# Patient Record
Sex: FEMALE | Race: WHITE | NOT HISPANIC OR LATINO | Employment: FULL TIME | URBAN - METROPOLITAN AREA
[De-identification: names, ages, dates, MRNs, and addresses within clinical notes are randomized per-mention and may not be internally consistent; named-entity substitution may affect disease eponyms.]

---

## 2017-09-08 ENCOUNTER — OFFICE VISIT (OUTPATIENT)
Dept: OPHTHALMOLOGY | Facility: CLINIC | Age: 47
End: 2017-09-08
Payer: COMMERCIAL

## 2017-09-08 DIAGNOSIS — H04.551 NASOLACRIMAL DUCT OBSTRUCTION, ACQUIRED, RIGHT: ICD-10-CM

## 2017-09-08 DIAGNOSIS — D31.32 CHOROIDAL NEVUS OF BOTH EYES: Primary | ICD-10-CM

## 2017-09-08 DIAGNOSIS — D31.31 CHOROIDAL NEVUS OF BOTH EYES: Primary | ICD-10-CM

## 2017-09-08 PROCEDURE — 92004 COMPRE OPH EXAM NEW PT 1/>: CPT | Mod: S$GLB,,, | Performed by: OPHTHALMOLOGY

## 2017-09-08 PROCEDURE — 99999 PR PBB SHADOW E&M-NEW PATIENT-LVL III: CPT | Mod: PBBFAC,,, | Performed by: OPHTHALMOLOGY

## 2017-09-08 PROCEDURE — 92250 FUNDUS PHOTOGRAPHY W/I&R: CPT | Mod: S$GLB,,, | Performed by: OPHTHALMOLOGY

## 2017-09-08 RX ORDER — FERROUS SULFATE 325(65) MG
325 TABLET ORAL
COMMUNITY
End: 2019-01-30

## 2017-09-08 RX ORDER — ROSUVASTATIN CALCIUM 40 MG/1
10 TABLET, COATED ORAL NIGHTLY
COMMUNITY
End: 2019-12-30

## 2017-09-08 RX ORDER — MAGNESIUM 30 MG
TABLET ORAL ONCE
COMMUNITY
End: 2019-01-30

## 2017-09-08 RX ORDER — LEVOTHYROXINE SODIUM 75 UG/1
75 TABLET ORAL DAILY
COMMUNITY
End: 2019-01-01 | Stop reason: SDUPTHER

## 2017-09-08 RX ORDER — CHOLECALCIFEROL (VITAMIN D3) 25 MCG
5000 TABLET ORAL DAILY
COMMUNITY
End: 2023-05-09

## 2017-09-08 RX ORDER — NAPROXEN SODIUM 220 MG/1
81 TABLET, FILM COATED ORAL DAILY
COMMUNITY
End: 2019-08-15

## 2017-09-08 RX ORDER — BUPROPION HYDROCHLORIDE 100 MG/1
100 TABLET ORAL 2 TIMES DAILY
COMMUNITY
End: 2019-01-30

## 2017-09-08 RX ORDER — ALPRAZOLAM 0.25 MG/1
0.25 TABLET ORAL NIGHTLY PRN
COMMUNITY

## 2017-09-08 RX ORDER — LORATADINE 10 MG/1
10 TABLET ORAL DAILY
COMMUNITY
End: 2019-01-30

## 2017-09-08 NOTE — PATIENT INSTRUCTIONS
Mild nasolacrimal duct obstruction.  Apply pressure to area a few times a week to express it so it will flow better.  If no better, you may need probing to open the area.  At this time very mild, no need for further intervention.

## 2017-09-08 NOTE — PROGRESS NOTES
===============================  09/08/2017   Mable Redding,   46 y.o. female   Last visit [facility]: :Visit date not found   Last visit eye dept. Visit date not found  [facility]:  Uncorrected distance visual acuity was 20/20 in the right eye and 20/20 in the left eye.  Tonometry     Tonometry (Applanation, 9:51 AM)       Right Left    Pressure 18 18               Not recorded         Not recorded        Chief Complaint   Patient presents with    choroidal nevus     pt states that she has a nevus in both eyes        HPI     choroidal nevus    Additional comments: pt states that she has a nevus in both eyes           Comments   Nevus ou         Last edited by MARI Crane on 9/8/2017  9:43 AM. (History)      Read Studies:   Vitals  ________________  9/8/2017  Problem List Items Addressed This Visit     None      Visit Diagnoses     Choroidal nevus of both eyes    -  Primary    Relevant Orders    Color Fundus Photography - OU - Both Eyes (Completed)    Nasolacrimal duct obstruction, acquired, right            Mild NLDO OD., recommend mild pressure to express  Nevus diagnosed years outside clinic both eyes, benign  OD 1DD Nevus along inf arcade Darker lesion @ 7:00 v base Chrp  OS choroidal pigment  rtc 1 year  .       ===========================

## 2018-10-19 DIAGNOSIS — J34.89 INTERNAL NASAL LESION: Primary | ICD-10-CM

## 2018-10-20 ENCOUNTER — NURSE TRIAGE (OUTPATIENT)
Dept: ADMINISTRATIVE | Facility: CLINIC | Age: 48
End: 2018-10-20

## 2018-10-21 DIAGNOSIS — J34.89 NASAL LESION: Primary | ICD-10-CM

## 2018-10-21 RX ORDER — MUPIROCIN 20 MG/G
OINTMENT TOPICAL 2 TIMES DAILY
Qty: 22 G | Refills: 1 | Status: SHIPPED | OUTPATIENT
Start: 2018-10-21 | End: 2020-03-27

## 2018-11-07 ENCOUNTER — DOCUMENTATION ONLY (OUTPATIENT)
Dept: HEMATOLOGY/ONCOLOGY | Facility: CLINIC | Age: 48
End: 2018-11-07

## 2018-11-07 DIAGNOSIS — J01.00 SUBACUTE MAXILLARY SINUSITIS: Primary | ICD-10-CM

## 2018-11-07 RX ORDER — AMOXICILLIN AND CLAVULANATE POTASSIUM 875; 125 MG/1; MG/1
1 TABLET, FILM COATED ORAL 2 TIMES DAILY
Qty: 28 TABLET | Refills: 0 | Status: SHIPPED | OUTPATIENT
Start: 2018-11-07 | End: 2018-11-21

## 2018-11-07 NOTE — PROGRESS NOTES
She came in complaining of a productive cough, sonus congestion and facial pressure.  No obvious fver or SOB  Lungs were clear and well ventilated. Oropharynx unremarkable.ears normal.  Will prescribe augmentin 875

## 2019-01-01 DIAGNOSIS — E03.9 ACQUIRED HYPOTHYROIDISM: Primary | ICD-10-CM

## 2019-01-01 RX ORDER — LEVOTHYROXINE SODIUM 75 UG/1
75 TABLET ORAL DAILY
Qty: 90 TABLET | Refills: 3 | Status: SHIPPED | OUTPATIENT
Start: 2019-01-01 | End: 2019-12-30

## 2019-01-28 DIAGNOSIS — R07.89 OTHER CHEST PAIN: Primary | ICD-10-CM

## 2019-01-30 ENCOUNTER — OFFICE VISIT (OUTPATIENT)
Dept: CARDIOLOGY | Facility: CLINIC | Age: 49
End: 2019-01-30
Payer: COMMERCIAL

## 2019-01-30 ENCOUNTER — CLINICAL SUPPORT (OUTPATIENT)
Dept: CARDIOLOGY | Facility: CLINIC | Age: 49
End: 2019-01-30
Payer: COMMERCIAL

## 2019-01-30 VITALS
BODY MASS INDEX: 33.91 KG/M2 | WEIGHT: 211 LBS | HEIGHT: 66 IN | DIASTOLIC BLOOD PRESSURE: 84 MMHG | HEART RATE: 76 BPM | SYSTOLIC BLOOD PRESSURE: 132 MMHG

## 2019-01-30 DIAGNOSIS — R07.89 OTHER CHEST PAIN: ICD-10-CM

## 2019-01-30 DIAGNOSIS — I26.99 IATROGENIC PULMONARY EMBOLISM AND INFARCTION, SUBSEQUENT ENCOUNTER: ICD-10-CM

## 2019-01-30 DIAGNOSIS — R06.02 SHORTNESS OF BREATH: Primary | ICD-10-CM

## 2019-01-30 DIAGNOSIS — Z82.49 FAMILY HISTORY OF PREMATURE CAD: ICD-10-CM

## 2019-01-30 DIAGNOSIS — Z79.01 LONG TERM (CURRENT) USE OF ANTICOAGULANTS: ICD-10-CM

## 2019-01-30 DIAGNOSIS — R00.2 PALPITATION: ICD-10-CM

## 2019-01-30 DIAGNOSIS — T81.718D IATROGENIC PULMONARY EMBOLISM AND INFARCTION, SUBSEQUENT ENCOUNTER: ICD-10-CM

## 2019-01-30 DIAGNOSIS — D68.62 LUPUS ANTICOAGULANT DISORDER: ICD-10-CM

## 2019-01-30 DIAGNOSIS — R93.1 HIGH CORONARY ARTERY CALCIUM SCORE: ICD-10-CM

## 2019-01-30 PROCEDURE — 93000 ELECTROCARDIOGRAM COMPLETE: CPT | Mod: S$GLB,,, | Performed by: INTERNAL MEDICINE

## 2019-01-30 PROCEDURE — 3008F PR BODY MASS INDEX (BMI) DOCUMENTED: ICD-10-PCS | Mod: CPTII,S$GLB,, | Performed by: INTERNAL MEDICINE

## 2019-01-30 PROCEDURE — 3008F BODY MASS INDEX DOCD: CPT | Mod: CPTII,S$GLB,, | Performed by: INTERNAL MEDICINE

## 2019-01-30 PROCEDURE — 99999 PR PBB SHADOW E&M-EST. PATIENT-LVL III: CPT | Mod: PBBFAC,,, | Performed by: INTERNAL MEDICINE

## 2019-01-30 PROCEDURE — 99999 PR PBB SHADOW E&M-EST. PATIENT-LVL III: ICD-10-PCS | Mod: PBBFAC,,, | Performed by: INTERNAL MEDICINE

## 2019-01-30 PROCEDURE — 99204 PR OFFICE/OUTPT VISIT, NEW, LEVL IV, 45-59 MIN: ICD-10-PCS | Mod: S$GLB,,, | Performed by: INTERNAL MEDICINE

## 2019-01-30 PROCEDURE — 99204 OFFICE O/P NEW MOD 45 MIN: CPT | Mod: S$GLB,,, | Performed by: INTERNAL MEDICINE

## 2019-01-30 PROCEDURE — 93000 EKG 12-LEAD: ICD-10-PCS | Mod: S$GLB,,, | Performed by: INTERNAL MEDICINE

## 2019-01-30 RX ORDER — LOSARTAN POTASSIUM 50 MG/1
100 TABLET ORAL
Refills: 1 | COMMUNITY
Start: 2019-01-19 | End: 2021-09-23

## 2019-01-30 NOTE — PROGRESS NOTES
Subjective:   Patient ID:  Mable Redding is a 48 y.o. female who presents for evaluation of Chest Pain; Shortness of Breath; Dizziness; Hypertension; Palpitations; and Consult      HPI  A 47 yo female with lupus anticoagulant positive in the setting of birth control pills elading to recurrent pe was on coumadin for 13 years also ahs fh premature cad and has high lp(a) and hscrp she is on statins and asa ec 81 mg po daily./ is here to establish care. She has nocturnal chest pain midsternal that occurs in the middle of the nite wakes her  Up from sleep no sour taste no choking she feels her heart race at times.  No snoring no cessation of breathing. She has exertional shortness of breath that is chronic no regular exercise physically . Has no symptoms of shortness of breath with her home daily activities she  Noticed that when talking and walking. No leg swelling. has palpitation. Has no syncope near syncope . Has hysterectomy dec 2017.she has ct scan of chest showing high calcium score no Report available   Past Medical History:   Diagnosis Date    Anxiety     Depression     Lupus anticoagulant disorder     Lupus anticoagulant disorder 1/30/2019       History reviewed. No pertinent surgical history.    Social History     Tobacco Use    Smoking status: Former Smoker    Smokeless tobacco: Never Used   Substance Use Topics    Alcohol use: No    Drug use: No       Family History   Problem Relation Age of Onset    No Known Problems Mother     No Known Problems Father     No Known Problems Sister     No Known Problems Brother     No Known Problems Maternal Aunt     No Known Problems Maternal Uncle     No Known Problems Paternal Aunt     No Known Problems Paternal Uncle     No Known Problems Maternal Grandmother     No Known Problems Maternal Grandfather     No Known Problems Paternal Grandmother     No Known Problems Paternal Grandfather     Amblyopia Neg Hx     Blindness Neg Hx     Cancer Neg Hx      Cataracts Neg Hx     Diabetes Neg Hx     Glaucoma Neg Hx     Hypertension Neg Hx     Macular degeneration Neg Hx     Retinal detachment Neg Hx     Strabismus Neg Hx     Stroke Neg Hx     Thyroid disease Neg Hx        Current Outpatient Medications   Medication Sig    acetic acid-hydrocortisone (VOSOL-HC) otic solution Place in ear(s). 2  Otic Four times a day    alprazolam (XANAX) 0.25 MG tablet Take 0.25 mg by mouth 3 (three) times daily.    aspirin 81 MG Chew Take 81 mg by mouth once daily.    levothyroxine (SYNTHROID) 75 MCG tablet Take 1 tablet (75 mcg total) by mouth once daily.    losartan (COZAAR) 50 MG tablet TK 1 T PO QD    mupirocin (BACTROBAN) 2 % ointment Apply topically 2 (two) times daily.    rosuvastatin (CRESTOR) 40 MG Tab Take 10 mg by mouth every evening.    vitamin D 1000 units Tab Take 5,000 Units by mouth once daily.      No current facility-administered medications for this visit.      Current Outpatient Medications on File Prior to Visit   Medication Sig    acetic acid-hydrocortisone (VOSOL-HC) otic solution Place in ear(s). 2  Otic Four times a day    alprazolam (XANAX) 0.25 MG tablet Take 0.25 mg by mouth 3 (three) times daily.    aspirin 81 MG Chew Take 81 mg by mouth once daily.    levothyroxine (SYNTHROID) 75 MCG tablet Take 1 tablet (75 mcg total) by mouth once daily.    losartan (COZAAR) 50 MG tablet TK 1 T PO QD    mupirocin (BACTROBAN) 2 % ointment Apply topically 2 (two) times daily.    rosuvastatin (CRESTOR) 40 MG Tab Take 10 mg by mouth every evening.    vitamin D 1000 units Tab Take 5,000 Units by mouth once daily.     [DISCONTINUED] buPROPion (WELLBUTRIN) 100 MG tablet Take 100 mg by mouth 2 (two) times daily.    [DISCONTINUED] cetirizine (ZYRTEC) 10 MG tablet Take 10 mg by mouth. 1 Tablet Oral Every day    [DISCONTINUED] ciprofloxacin-hydrocortisone 0.2-1% (CIPRO HC) otic suspension     [DISCONTINUED] ferrous sulfate 325 mg (65 mg iron) Tab tablet  Take 325 mg by mouth daily with breakfast.    [DISCONTINUED] loratadine (CLARITIN) 10 mg tablet Take 10 mg by mouth once daily.    [DISCONTINUED] magnesium 30 mg Tab Take by mouth once.    [DISCONTINUED] moxifloxacin (AVELOX) 400 mg tablet Take 400 mg by mouth. 1 Tablet Oral Every day    [DISCONTINUED] mupirocin calcium 2% nasl oint (BACTROBAN) 2 % Oint by Nasal route 2 (two) times daily.    [DISCONTINUED] neomycin-polymyxin-hydrocortisone (CORTISPORIN) otic solution Place in ear(s). 4 Solution Otic Four times a day    [DISCONTINUED] omeprazole (PRILOSEC) 20 MG capsule Take 20 mg by mouth. 1 Capsule, Delayed Release(E.C.) Oral Every day    [DISCONTINUED] sertraline (ZOLOFT) 50 MG tablet Take 50 mg by mouth. 1 Tablet Oral Every day     No current facility-administered medications on file prior to visit.        Review of patient's allergies indicates:   Allergen Reactions    Aldactone [spironolactone]     Sulfa (sulfonamide antibiotics) Edema       Review of Systems   Constitution: Negative for diaphoresis, weakness, malaise/fatigue and weight gain.   HENT: Negative for hoarse voice.    Eyes: Negative for double vision and visual disturbance.   Cardiovascular: Positive for dyspnea on exertion, irregular heartbeat and palpitations. Negative for chest pain, claudication, cyanosis, leg swelling, near-syncope, orthopnea, paroxysmal nocturnal dyspnea and syncope.   Respiratory: Positive for shortness of breath. Negative for cough, hemoptysis and snoring.    Hematologic/Lymphatic: Negative for bleeding problem. Does not bruise/bleed easily.   Skin: Negative for color change and poor wound healing.   Musculoskeletal: Negative for muscle cramps, muscle weakness and myalgias.   Gastrointestinal: Negative for bloating, abdominal pain, change in bowel habit, diarrhea, heartburn, hematemesis, hematochezia, melena and nausea.   Neurological: Negative for excessive daytime sleepiness, dizziness, headaches,  "light-headedness, loss of balance and numbness.   Psychiatric/Behavioral: Negative for memory loss. The patient does not have insomnia.    Allergic/Immunologic: Negative for hives.       Objective:   Physical Exam   Constitutional: She is oriented to person, place, and time. She appears well-developed and well-nourished. She does not appear ill. No distress.   HENT:   Head: Normocephalic and atraumatic.   Eyes: EOM are normal. Pupils are equal, round, and reactive to light. No scleral icterus.   Neck: Normal range of motion. Neck supple. Normal carotid pulses, no hepatojugular reflux and no JVD present. Carotid bruit is not present. No tracheal deviation present. No thyromegaly present.   Cardiovascular: Normal rate, regular rhythm, normal heart sounds and normal pulses. Exam reveals no gallop and no friction rub.   No murmur heard.  Pulmonary/Chest: Effort normal and breath sounds normal. No respiratory distress. She has no wheezes. She has no rhonchi. She has no rales. She exhibits no tenderness.   Abdominal: Soft. Normal appearance, normal aorta and bowel sounds are normal. She exhibits no abdominal bruit, no ascites and no pulsatile midline mass. There is no hepatomegaly. There is no tenderness.   Musculoskeletal: She exhibits no edema.        Right shoulder: She exhibits no deformity.   Neurological: She is alert and oriented to person, place, and time. She has normal strength. No cranial nerve deficit. Coordination normal.   Skin: Skin is warm and dry. No rash noted. She is not diaphoretic. No cyanosis or erythema. Nails show no clubbing.   Psychiatric: She has a normal mood and affect. Her speech is normal and behavior is normal.   Nursing note and vitals reviewed.    Vitals:    01/30/19 0759   BP: 132/84   BP Location: Right arm   Patient Position: Sitting   BP Method: Medium (Manual)   Pulse: 76   Weight: 95.7 kg (211 lb)   Height: 5' 6" (1.676 m)     Lab Results   Component Value Date    CHOL 197 " 10/16/2007    CHOL 227 (H) 08/07/2007     Lab Results   Component Value Date    HDL 52 10/16/2007    HDL 58 08/07/2007     Lab Results   Component Value Date    LDLCALC 113.0 10/16/2007    LDLCALC 137.6 (H) 08/07/2007     Lab Results   Component Value Date    TRIG 160 (H) 10/16/2007    TRIG 157 (H) 08/07/2007     Lab Results   Component Value Date    CHOLHDL 26.4 10/16/2007    CHOLHDL 25.6 08/07/2007       Chemistry        Component Value Date/Time     10/16/2007 0829    K 4.1 10/16/2007 0829     10/16/2007 0829    CO2 25 10/16/2007 0829    BUN 10 10/16/2007 0829    CREATININE 0.9 10/16/2007 0829     10/16/2007 0829        Component Value Date/Time    CALCIUM 9.4 10/16/2007 0829    ALKPHOS 75 10/16/2007 0829    AST 22 10/16/2007 0829    ALT 35 (H) 10/16/2007 0829    BILITOT 0.4 10/16/2007 0829          Lab Results   Component Value Date    TSH 6.1 (H) 10/23/2007     Lab Results   Component Value Date    INR 3.3 (H) 05/13/2013    INR 3.5 (H) 04/25/2013    INR 2.8 (H) 03/28/2013     Lab Results   Component Value Date    WBC 7.56 10/16/2007    HGB 13.0 10/16/2007    HCT 38.9 10/16/2007    MCV 79.4 (L) 10/16/2007     10/16/2007     BNP  @LABRCNTIP(BNP,BNPTRIAGEBLO)@  CrCl cannot be calculated (Patient's most recent lab result is older than the maximum 7 days allowed.).  Assessment:     1. Shortness of breath    2. Lupus anticoagulant disorder    3. Iatrogenic pulmonary embolism and infarction, subsequent encounter    4. Long term (current) use of anticoagulants    5. Palpitation    6. High coronary artery calcium score    7. Family history of premature CAD      Multiple risk factor for cad with high ca score strong fh cad with some shortness of breath and plapitation. I will need to r/o active coronary ischemia. Agree withs aidan asa  Will obtain echo stress echo and holter. Need to get official report of ct scan and labs from pcp since I need to know her ldl and lp(a) hscrp. tsh.  Plan:    Echo stress echo   holter   Get lab rtesults and ctscan report   Then decide on f/u.  Calcium score on ct is 1.1

## 2019-02-10 ENCOUNTER — HOSPITAL ENCOUNTER (EMERGENCY)
Facility: HOSPITAL | Age: 49
Discharge: HOME | End: 2019-02-10
Attending: EMERGENCY MEDICINE
Payer: COMMERCIAL

## 2019-02-10 VITALS
WEIGHT: 216.94 LBS | RESPIRATION RATE: 20 BRPM | SYSTOLIC BLOOD PRESSURE: 153 MMHG | BODY MASS INDEX: 34.86 KG/M2 | TEMPERATURE: 98 F | DIASTOLIC BLOOD PRESSURE: 72 MMHG | HEIGHT: 66 IN | HEART RATE: 70 BPM | OXYGEN SATURATION: 100 %

## 2019-02-10 DIAGNOSIS — R10.9 ABDOMINAL PAIN: ICD-10-CM

## 2019-02-10 DIAGNOSIS — K21.9 GASTROESOPHAGEAL REFLUX DISEASE, ESOPHAGITIS PRESENCE NOT SPECIFIED: ICD-10-CM

## 2019-02-10 DIAGNOSIS — R10.11 RUQ PAIN: Primary | ICD-10-CM

## 2019-02-10 LAB
ALBUMIN SERPL BCP-MCNC: 3.6 G/DL
ALP SERPL-CCNC: 73 U/L
ALT SERPL W/O P-5'-P-CCNC: 21 U/L
ANION GAP SERPL CALC-SCNC: 10 MMOL/L
APTT BLDCRRT: 24.2 SEC
AST SERPL-CCNC: 16 U/L
BASOPHILS # BLD AUTO: 0.03 K/UL
BASOPHILS NFR BLD: 0.3 %
BILIRUB SERPL-MCNC: 0.3 MG/DL
BNP SERPL-MCNC: 12 PG/ML
BUN SERPL-MCNC: 15 MG/DL
CALCIUM SERPL-MCNC: 9.8 MG/DL
CHLORIDE SERPL-SCNC: 106 MMOL/L
CO2 SERPL-SCNC: 26 MMOL/L
CREAT SERPL-MCNC: 0.9 MG/DL
D DIMER PPP IA.FEU-MCNC: 0.44 MG/L FEU
DIFFERENTIAL METHOD: ABNORMAL
EOSINOPHIL # BLD AUTO: 0.3 K/UL
EOSINOPHIL NFR BLD: 3.5 %
ERYTHROCYTE [DISTWIDTH] IN BLOOD BY AUTOMATED COUNT: 13.9 %
EST. GFR  (AFRICAN AMERICAN): >60 ML/MIN/1.73 M^2
EST. GFR  (NON AFRICAN AMERICAN): >60 ML/MIN/1.73 M^2
GLUCOSE SERPL-MCNC: 108 MG/DL
HCT VFR BLD AUTO: 39 %
HGB BLD-MCNC: 13.2 G/DL
INR PPP: 0.9
LIPASE SERPL-CCNC: 38 U/L
LYMPHOCYTES # BLD AUTO: 2.9 K/UL
LYMPHOCYTES NFR BLD: 30.1 %
MCH RBC QN AUTO: 28.2 PG
MCHC RBC AUTO-ENTMCNC: 33.8 G/DL
MCV RBC AUTO: 83 FL
MONOCYTES # BLD AUTO: 0.8 K/UL
MONOCYTES NFR BLD: 8.5 %
NEUTROPHILS # BLD AUTO: 5.5 K/UL
NEUTROPHILS NFR BLD: 57.6 %
PLATELET # BLD AUTO: 243 K/UL
PMV BLD AUTO: 9 FL
POTASSIUM SERPL-SCNC: 3.8 MMOL/L
PROT SERPL-MCNC: 6.6 G/DL
PROTHROMBIN TIME: 9.8 SEC
RBC # BLD AUTO: 4.68 M/UL
SODIUM SERPL-SCNC: 142 MMOL/L
TROPONIN I SERPL DL<=0.01 NG/ML-MCNC: <0.006 NG/ML
WBC # BLD AUTO: 9.53 K/UL

## 2019-02-10 PROCEDURE — 83880 ASSAY OF NATRIURETIC PEPTIDE: CPT

## 2019-02-10 PROCEDURE — 93010 ELECTROCARDIOGRAM REPORT: CPT | Mod: ,,, | Performed by: INTERNAL MEDICINE

## 2019-02-10 PROCEDURE — 25000003 PHARM REV CODE 250: Performed by: EMERGENCY MEDICINE

## 2019-02-10 PROCEDURE — 93005 ELECTROCARDIOGRAM TRACING: CPT

## 2019-02-10 PROCEDURE — 84484 ASSAY OF TROPONIN QUANT: CPT

## 2019-02-10 PROCEDURE — 83690 ASSAY OF LIPASE: CPT

## 2019-02-10 PROCEDURE — 85610 PROTHROMBIN TIME: CPT

## 2019-02-10 PROCEDURE — 85379 FIBRIN DEGRADATION QUANT: CPT

## 2019-02-10 PROCEDURE — 80053 COMPREHEN METABOLIC PANEL: CPT

## 2019-02-10 PROCEDURE — 93010 EKG 12-LEAD: ICD-10-PCS | Mod: ,,, | Performed by: INTERNAL MEDICINE

## 2019-02-10 PROCEDURE — 85025 COMPLETE CBC W/AUTO DIFF WBC: CPT

## 2019-02-10 PROCEDURE — 99285 EMERGENCY DEPT VISIT HI MDM: CPT | Mod: 25

## 2019-02-10 PROCEDURE — 85730 THROMBOPLASTIN TIME PARTIAL: CPT

## 2019-02-10 RX ADMIN — DICYCLOMINE HYDROCHLORIDE 50 ML: 10 SOLUTION ORAL at 08:02

## 2019-02-11 NOTE — ED PROVIDER NOTES
SCRIBE #1 NOTE: I, Taylor Bobby, am scribing for, and in the presence of, Jelly Mar MD. I have scribed the entire note.      History      Chief Complaint   Patient presents with    Abdominal Pain     pt reports RLQ pain, intermittent stabbing pain, started approx 30 min ago       Review of patient's allergies indicates:   Allergen Reactions    Aldactone [spironolactone]     Sulfa (sulfonamide antibiotics) Edema        HPI   HPI    2/10/2019, 7:06 PM   History obtained from the patient      History of Present Illness: Mable Redding is a 48 y.o. female patient with PMHx of multiple PEs who presents to the Emergency Department for evaluation of RUQ pain which onset suddenly x1 hour ago. Patient states she ate barb cake before sxs onset. Pain is intermittent, stabbing and mild in severity. Reports symptoms have currently resolved. No mitigating or exacerbating factors reported. Associated sxs include SOB with exertion. Patient denies any fever, chills, CP, pain with breathing, N/V/D, cough, extremity pain/swelling, and all other sxs at this time. No prior Tx reported. Patient notes she takes 1 baby aspirin per day. No further complaints or concerns at this time.         Arrival mode: Personal vehicle    PCP: Casa Reeder Jr, MD       Past Medical History:  Past Medical History:   Diagnosis Date    Anxiety     Depression     Lupus anticoagulant disorder     Lupus anticoagulant disorder 1/30/2019       Past Surgical History:  History reviewed. No pertinent surgical history.      Family History:  Family History   Problem Relation Age of Onset    No Known Problems Mother     No Known Problems Father     No Known Problems Sister     No Known Problems Brother     No Known Problems Maternal Aunt     No Known Problems Maternal Uncle     No Known Problems Paternal Aunt     No Known Problems Paternal Uncle     No Known Problems Maternal Grandmother     No Known Problems Maternal Grandfather     No  Known Problems Paternal Grandmother     No Known Problems Paternal Grandfather     Amblyopia Neg Hx     Blindness Neg Hx     Cancer Neg Hx     Cataracts Neg Hx     Diabetes Neg Hx     Glaucoma Neg Hx     Hypertension Neg Hx     Macular degeneration Neg Hx     Retinal detachment Neg Hx     Strabismus Neg Hx     Stroke Neg Hx     Thyroid disease Neg Hx        Social History:  Social History     Tobacco Use    Smoking status: Former Smoker    Smokeless tobacco: Never Used   Substance and Sexual Activity    Alcohol use: No    Drug use: No    Sexual activity: unknown       ROS   Review of Systems   Constitutional: Negative for activity change, appetite change, chills, diaphoresis, fatigue and fever.   HENT: Negative for congestion, ear pain, nosebleeds, rhinorrhea, sinus pain, sore throat and trouble swallowing.    Eyes: Negative for pain and discharge.   Respiratory: Positive for shortness of breath (with exertion). Negative for cough, chest tightness, wheezing and stridor.    Cardiovascular: Negative for chest pain, palpitations and leg swelling.   Gastrointestinal: Positive for abdominal pain (RUQ). Negative for abdominal distention, blood in stool, constipation, diarrhea, nausea and vomiting.   Genitourinary: Negative for difficulty urinating, dysuria, flank pain, frequency, hematuria and urgency.   Musculoskeletal: Negative for arthralgias, back pain, myalgias and neck pain.        (-) leg pain   Skin: Negative for pallor, rash and wound.   Neurological: Negative for dizziness, syncope, weakness, light-headedness, numbness and headaches.   Hematological: Does not bruise/bleed easily.   Psychiatric/Behavioral: Negative for confusion and self-injury.   All other systems reviewed and are negative.    Physical Exam      Initial Vitals [02/10/19 1849]   BP Pulse Resp Temp SpO2   (!) 184/101 91 18 98 °F (36.7 °C) 99 %      MAP       --          Physical Exam  Nursing Notes and Vital Signs  "Reviewed.  Constitutional: Patient is in no acute distress. Well-developed and well-nourished.  Head: Atraumatic. Normocephalic.  Eyes: PERRL. EOM intact. Conjunctivae are not pale. No scleral icterus.  ENT: Mucous membranes are moist. Oropharynx is clear and symmetric.    Neck: Supple. Full ROM. No lymphadenopathy.  Cardiovascular: Regular rate. Regular rhythm. No murmurs, rubs, or gallops. Distal pulses are 2+ and symmetric.  Pulmonary/Chest: No respiratory distress. Clear to auscultation bilaterally. No wheezing or rales.  Abdominal: Negative Lang's sign. Soft and non-distended. There is no RUQ tenderness.  No rebound, guarding, or rigidity. Good bowel sounds.  Genitourinary: No CVA tenderness  Musculoskeletal: Moves all extremities. No obvious deformities. No edema. No calf tenderness.  Skin: Warm and dry.  Neurological:  Alert, awake, and appropriate.  Normal speech.  No acute focal neurological deficits are appreciated.  Psychiatric: Normal affect. Good eye contact. Appropriate in content.    ED Course    Procedures  ED Vital Signs:  Vitals:    02/10/19 1849 02/10/19 1919 02/10/19 1932 02/10/19 1933   BP: (!) 184/101  (!) 146/71    Pulse: 91 79  78   Resp: 18   20   Temp: 98 °F (36.7 °C)      TempSrc: Oral      SpO2: 99%  100% 100%   Weight: 98.4 kg (216 lb 14.9 oz)      Height: 5' 6" (1.676 m)       02/10/19 2012   BP: (!) 153/72   Pulse: 70   Resp: 20   Temp:    TempSrc:    SpO2: 100%   Weight:    Height:        Abnormal Lab Results:  Labs Reviewed   CBC W/ AUTO DIFFERENTIAL - Abnormal; Notable for the following components:       Result Value    MPV 9.0 (*)     All other components within normal limits   COMPREHENSIVE METABOLIC PANEL   TROPONIN I   B-TYPE NATRIURETIC PEPTIDE   LIPASE   D DIMER, QUANTITATIVE   PROTIME-INR   APTT        All Lab Results:  Results for orders placed or performed during the hospital encounter of 02/10/19   CBC auto differential   Result Value Ref Range    WBC 9.53 3.90 - 12.70 " K/uL    RBC 4.68 4.00 - 5.40 M/uL    Hemoglobin 13.2 12.0 - 16.0 g/dL    Hematocrit 39.0 37.0 - 48.5 %    MCV 83 82 - 98 fL    MCH 28.2 27.0 - 31.0 pg    MCHC 33.8 32.0 - 36.0 g/dL    RDW 13.9 11.5 - 14.5 %    Platelets 243 150 - 350 K/uL    MPV 9.0 (L) 9.2 - 12.9 fL    Gran # (ANC) 5.5 1.8 - 7.7 K/uL    Lymph # 2.9 1.0 - 4.8 K/uL    Mono # 0.8 0.3 - 1.0 K/uL    Eos # 0.3 0.0 - 0.5 K/uL    Baso # 0.03 0.00 - 0.20 K/uL    Gran% 57.6 38.0 - 73.0 %    Lymph% 30.1 18.0 - 48.0 %    Mono% 8.5 4.0 - 15.0 %    Eosinophil% 3.5 0.0 - 8.0 %    Basophil% 0.3 0.0 - 1.9 %    Differential Method Automated    Comprehensive metabolic panel   Result Value Ref Range    Sodium 142 136 - 145 mmol/L    Potassium 3.8 3.5 - 5.1 mmol/L    Chloride 106 95 - 110 mmol/L    CO2 26 23 - 29 mmol/L    Glucose 108 70 - 110 mg/dL    BUN, Bld 15 6 - 20 mg/dL    Creatinine 0.9 0.5 - 1.4 mg/dL    Calcium 9.8 8.7 - 10.5 mg/dL    Total Protein 6.6 6.0 - 8.4 g/dL    Albumin 3.6 3.5 - 5.2 g/dL    Total Bilirubin 0.3 0.1 - 1.0 mg/dL    Alkaline Phosphatase 73 55 - 135 U/L    AST 16 10 - 40 U/L    ALT 21 10 - 44 U/L    Anion Gap 10 8 - 16 mmol/L    eGFR if African American >60 >60 mL/min/1.73 m^2    eGFR if non African American >60 >60 mL/min/1.73 m^2   Troponin I #1   Result Value Ref Range    Troponin I <0.006 0.000 - 0.026 ng/mL   B-Type natriuretic peptide (BNP)   Result Value Ref Range    BNP 12 0 - 99 pg/mL   Lipase   Result Value Ref Range    Lipase 38 4 - 60 U/L   D dimer, quantitative   Result Value Ref Range    D-Dimer 0.44 <0.50 mg/L FEU   Protime-INR   Result Value Ref Range    Prothrombin Time 9.8 9.0 - 12.5 sec    INR 0.9 0.8 - 1.2   APTT   Result Value Ref Range    aPTT 24.2 21.0 - 32.0 sec       Imaging Results:  Imaging Results          X-Ray Chest PA And Lateral (Final result)  Result time 02/10/19 20:29:24    Final result by Gonzalez Sim MD (02/10/19 20:29:24)                 Impression:      1.  Negative for acute process involving the  chest.    2.  Incidental findings as noted above.      Electronically signed by: Gonzalez Sim MD  Date:    02/10/2019  Time:    20:29             Narrative:    EXAMINATION:  XR CHEST PA AND LATERAL    CLINICAL HISTORY:  Chest Pain;    COMPARISON:  No comparison studies are available.    FINDINGS:  EKG leads overlie the chest.  The lungs are clear. The cardiac silhouette size is normal. The trachea is midline and the mediastinal width is normal. Negative for focal infiltrate, effusion or pneumothorax. Pulmonary vasculature is normal. Negative for osseous abnormalities. Tortuous aorta.  Marginal spondylosis.  Mild elevation of right hemidiaphragm.                               The EKG was ordered, reviewed, and independently interpreted by the ED provider.  Interpretation time: 19:19  Rate: 79 BPM  Rhythm: normal sinus rhythm  Interpretation: No acute ST changes. No STEMI.           The Emergency Provider reviewed the vital signs and test results, which are outlined above.    ED Discussion     8:25 PM: Re-evaluated patient at this time. Discussed lab results and suggestion for US gallbladder. Patient says her pain has improved so she would rather get US done outpatient. Patient is still non tender on exam. Discussed pt dx of RUQ pain and plan of tx. Gave pt all f/u and return to the ED instructions. All questions and concerns were addressed at this time. Pt expresses understanding of information and instructions, and is comfortable with plan to discharge. Pt is stable for discharge.    I discussed with patient and/or family/caretaker that evaluation in the ED does not suggest any emergent or life threatening medical conditions requiring immediate intervention beyond what was provided in the ED, and I believe patient is safe for discharge.  Regardless, an unremarkable evaluation in the ED does not preclude the development or presence of a serious of life threatening condition. As such, patient was instructed to return  immediately for any worsening or change in current symptoms.    ED Medication(s):  Medications   GI cocktail (mylanta 30 mL, lidocaine 2 % viscous 10 mL, dicyclomine 10 mL) 50 mL (50 mLs Oral Given 2/10/19 2024)       Follow-up Information     Casa Reeder Jr, MD. Schedule an appointment as soon as possible for a visit in 2 days.    Specialty:  Family Medicine  Why:  Return to the Emergency Room, If symptoms worsen  Contact information:  [address] [facility]  [address]  [facility] [address]  [phone]                   Current Discharge Medication List            Medical Decision Making    Medical Decision Making:   Clinical Tests:   Lab Tests: Reviewed and Ordered  Radiological Study: Ordered and Reviewed  Medical Tests: Ordered and Reviewed           Scribe Attestation:   Scribe #1: I performed the above scribed service and the documentation accurately describes the services I performed. I attest to the accuracy of the note.    Attending:   Physician Attestation Statement for Scribe #1: I, Jelly Mar MD, personally performed the services described in this documentation, as scribed by Taylor Bobby, in my presence, and it is both accurate and complete.          Clinical Impression       ICD-10-CM ICD-9-CM   1. RUQ pain R10.11 789.01   2. Abdominal pain R10.9 789.00   3. Gastroesophageal reflux disease, esophagitis presence not specified K21.9 530.81       Disposition:   Disposition: Discharged  Condition: Stable         Jelly Mar MD  02/10/19 2053

## 2019-02-12 ENCOUNTER — CLINICAL SUPPORT (OUTPATIENT)
Dept: CARDIOLOGY | Facility: CLINIC | Age: 49
End: 2019-02-12
Attending: INTERNAL MEDICINE
Payer: COMMERCIAL

## 2019-02-12 DIAGNOSIS — Z82.49 FAMILY HISTORY OF PREMATURE CAD: ICD-10-CM

## 2019-02-12 DIAGNOSIS — R00.2 PALPITATION: ICD-10-CM

## 2019-02-12 DIAGNOSIS — R06.02 SHORTNESS OF BREATH: ICD-10-CM

## 2019-02-12 DIAGNOSIS — R93.1 HIGH CORONARY ARTERY CALCIUM SCORE: ICD-10-CM

## 2019-02-12 LAB
DIASTOLIC DYSFUNCTION: NO
RETIRED EF AND QEF - SEE NOTES: 60 (ref 55–65)

## 2019-02-12 PROCEDURE — 93351 STRESS TTE COMPLETE: CPT | Mod: S$GLB,,, | Performed by: INTERNAL MEDICINE

## 2019-02-12 PROCEDURE — 93320 DOPPLER ECHO COMPLETE: CPT | Mod: S$GLB,,, | Performed by: INTERNAL MEDICINE

## 2019-02-12 PROCEDURE — 93320 EXERCISE STRESS ECHO WITH COLOR FLOW: ICD-10-PCS | Mod: S$GLB,,, | Performed by: INTERNAL MEDICINE

## 2019-02-12 PROCEDURE — 93325 DOPPLER ECHO COLOR FLOW MAPG: CPT | Mod: S$GLB,,, | Performed by: INTERNAL MEDICINE

## 2019-02-12 PROCEDURE — 93351 EXERCISE STRESS ECHO WITH COLOR FLOW: ICD-10-PCS | Mod: S$GLB,,, | Performed by: INTERNAL MEDICINE

## 2019-02-12 PROCEDURE — 93325 EXERCISE STRESS ECHO WITH COLOR FLOW: ICD-10-PCS | Mod: S$GLB,,, | Performed by: INTERNAL MEDICINE

## 2019-02-13 ENCOUNTER — TELEPHONE (OUTPATIENT)
Dept: CARDIOLOGY | Facility: CLINIC | Age: 49
End: 2019-02-13

## 2019-02-13 DIAGNOSIS — R06.02 SHORTNESS OF BREATH: ICD-10-CM

## 2019-02-13 DIAGNOSIS — R07.89 OTHER CHEST PAIN: ICD-10-CM

## 2019-02-13 NOTE — TELEPHONE ENCOUNTER
----- Message from Milton Peterson MD sent at 2/12/2019  6:46 PM CST -----  Please schedule lexiscan cardiolite test is suboptimal.

## 2019-02-15 ENCOUNTER — HOSPITAL ENCOUNTER (OUTPATIENT)
Dept: RADIOLOGY | Facility: HOSPITAL | Age: 49
Discharge: HOME | End: 2019-02-15
Attending: EMERGENCY MEDICINE
Payer: COMMERCIAL

## 2019-02-15 DIAGNOSIS — R10.11 RUQ PAIN: ICD-10-CM

## 2019-02-15 PROCEDURE — 76705 US ABDOMEN LIMITED: ICD-10-PCS | Mod: 26,,, | Performed by: RADIOLOGY

## 2019-02-15 PROCEDURE — 76705 ECHO EXAM OF ABDOMEN: CPT | Mod: 26,,, | Performed by: RADIOLOGY

## 2019-02-15 PROCEDURE — 76705 ECHO EXAM OF ABDOMEN: CPT | Mod: TC

## 2019-02-18 ENCOUNTER — TELEPHONE (OUTPATIENT)
Dept: CARDIOLOGY | Facility: CLINIC | Age: 49
End: 2019-02-18

## 2019-02-18 NOTE — TELEPHONE ENCOUNTER
----- Message from Milton Peterson MD sent at 2/17/2019  5:48 PM CST -----  Few skipped beats on holter no significant rhythm issues.

## 2019-02-21 DIAGNOSIS — R10.11 RUQ PAIN: ICD-10-CM

## 2019-02-26 ENCOUNTER — CLINICAL SUPPORT (OUTPATIENT)
Dept: CARDIOLOGY | Facility: CLINIC | Age: 49
End: 2019-02-26
Attending: INTERNAL MEDICINE
Payer: COMMERCIAL

## 2019-02-26 ENCOUNTER — HOSPITAL ENCOUNTER (OUTPATIENT)
Dept: RADIOLOGY | Facility: HOSPITAL | Age: 49
Discharge: HOME | End: 2019-02-26
Attending: INTERNAL MEDICINE
Payer: COMMERCIAL

## 2019-02-26 DIAGNOSIS — R07.89 OTHER CHEST PAIN: ICD-10-CM

## 2019-02-26 DIAGNOSIS — R06.02 SHORTNESS OF BREATH: ICD-10-CM

## 2019-02-26 LAB — DIASTOLIC DYSFUNCTION: NO

## 2019-02-26 PROCEDURE — 93015 CV STRESS TEST SUPVJ I&R: CPT | Mod: S$GLB,,, | Performed by: INTERNAL MEDICINE

## 2019-02-26 PROCEDURE — A9502 TC99M TETROFOSMIN: HCPCS

## 2019-02-26 PROCEDURE — 93015 NM MULTI TREAD STRESS CARDIAC COMPONENT: ICD-10-PCS | Mod: S$GLB,,, | Performed by: INTERNAL MEDICINE

## 2019-02-26 PROCEDURE — 78452 HT MUSCLE IMAGE SPECT MULT: CPT | Mod: 26,,, | Performed by: INTERNAL MEDICINE

## 2019-02-26 PROCEDURE — 78452 NM MULTI TREAD STRESS CARDIAC COMPONENT: ICD-10-PCS | Mod: 26,,, | Performed by: INTERNAL MEDICINE

## 2019-04-04 DIAGNOSIS — B37.49 CANDIDA INFECTION OF GENITAL REGION: Primary | ICD-10-CM

## 2019-04-04 RX ORDER — FLUCONAZOLE 150 MG/1
150 TABLET ORAL DAILY
Qty: 14 TABLET | Refills: 0 | Status: SHIPPED | OUTPATIENT
Start: 2019-04-04 | End: 2019-04-18

## 2019-08-09 DIAGNOSIS — K12.2 ORAL INFECTION: Primary | ICD-10-CM

## 2019-08-09 RX ORDER — AMOXICILLIN AND CLAVULANATE POTASSIUM 875; 125 MG/1; MG/1
1 TABLET, FILM COATED ORAL 2 TIMES DAILY
Qty: 14 TABLET | Refills: 0 | Status: SHIPPED | OUTPATIENT
Start: 2019-08-09 | End: 2019-08-15

## 2019-08-15 ENCOUNTER — OFFICE VISIT (OUTPATIENT)
Dept: SURGERY | Facility: CLINIC | Age: 49
End: 2019-08-15
Payer: COMMERCIAL

## 2019-08-15 VITALS
DIASTOLIC BLOOD PRESSURE: 77 MMHG | BODY MASS INDEX: 34.08 KG/M2 | TEMPERATURE: 98 F | SYSTOLIC BLOOD PRESSURE: 125 MMHG | HEART RATE: 83 BPM | HEIGHT: 66 IN | WEIGHT: 212.06 LBS

## 2019-08-15 DIAGNOSIS — N64.9 NIPPLE LESION: Primary | ICD-10-CM

## 2019-08-15 PROCEDURE — 3008F BODY MASS INDEX DOCD: CPT | Mod: CPTII,S$GLB,, | Performed by: SURGERY

## 2019-08-15 PROCEDURE — 99999 PR PBB SHADOW E&M-EST. PATIENT-LVL III: ICD-10-PCS | Mod: PBBFAC,,, | Performed by: SURGERY

## 2019-08-15 PROCEDURE — 88305 TISSUE EXAM BY PATHOLOGIST: CPT | Performed by: PATHOLOGY

## 2019-08-15 PROCEDURE — 3008F PR BODY MASS INDEX (BMI) DOCUMENTED: ICD-10-PCS | Mod: CPTII,S$GLB,, | Performed by: SURGERY

## 2019-08-15 PROCEDURE — 99999 PR PBB SHADOW E&M-EST. PATIENT-LVL III: CPT | Mod: PBBFAC,,, | Performed by: SURGERY

## 2019-08-15 PROCEDURE — 11104 PUNCH BX SKIN SINGLE LESION: CPT | Mod: S$GLB,,, | Performed by: SURGERY

## 2019-08-15 PROCEDURE — 99204 OFFICE O/P NEW MOD 45 MIN: CPT | Mod: 25,S$GLB,, | Performed by: SURGERY

## 2019-08-15 PROCEDURE — 99204 PR OFFICE/OUTPT VISIT, NEW, LEVL IV, 45-59 MIN: ICD-10-PCS | Mod: 25,S$GLB,, | Performed by: SURGERY

## 2019-08-15 PROCEDURE — 88305 TISSUE EXAM BY PATHOLOGIST: CPT | Mod: 26,,, | Performed by: PATHOLOGY

## 2019-08-15 PROCEDURE — 11104 PR PUNCH BIOPSY, SKIN, SINGLE LESION: ICD-10-PCS | Mod: S$GLB,,, | Performed by: SURGERY

## 2019-08-15 PROCEDURE — 88305 TISSUE SPECIMEN TO PATHOLOGY, SURGERY: ICD-10-PCS | Mod: 26,,, | Performed by: PATHOLOGY

## 2019-08-15 RX ORDER — FINASTERIDE 5 MG/1
5 TABLET, FILM COATED ORAL NIGHTLY
COMMUNITY
End: 2023-05-09

## 2019-08-15 RX ORDER — LIOTHYRONINE SODIUM 5 UG/1
10 TABLET ORAL DAILY
COMMUNITY
End: 2021-06-15 | Stop reason: ALTCHOICE

## 2019-08-15 NOTE — PROGRESS NOTES
Punch Biopsy Procedure Note    Pre-operative Diagnosis: Suspicious lesion left nipple/areola complex    Post-operative Diagnosis: same    Locations:anterior nipple/areola complex left breast    Indications: see clinic note    Anesthesia: Lidocaine 1% with epinephrine without added sodium bicarbonate    Procedure Details   History of allergy to iodine: no  Patient informed of the risks (including bleeding and infection) and benefits of the   procedure and Written informed consent obtained.    The lesion and surrounding area was given a sterile prep using betadyne and draped in the usual sterile fashion. The skin was then stretched perpendicular to the skin tension lines and the lesion removed using the 6 mm punch. The resulting ellipse was then closed. The wound was closed with 4-0 Vicryl using simple interrupted stitches. Antibiotic ointment and a sterile dressing applied. The specimen was sent for pathologic examination. The patient tolerated the procedure well.    EBL: 2 ml    Findings:  Successful punch biopsy    Condition:  Stable    Complications:  none.    Plan:  1. Instructed to keep the wound dry and covered for 24-48h and clean thereafter.  2. Warning signs of infection were reviewed.    3. Recommended that the patient use OTC analgesics as needed for pain.

## 2019-08-15 NOTE — PROGRESS NOTES
History and Physical  Department of Surgery    CHIEF COMPLAINT: left breast nipple lesions with associated pain/itching    REFERRING:  Aaareferral Self  No address on file  Casa Reeder Jr, MD      Subjective:      Mable Redding is a 48 y.o. female referred for evaluation of left breast puritic nipple-areolar complex lesions. Change was noted 2 years ago.  Approximately 2 months ago she was put on antibiotic therapy by her dentist and noticed significant improvement in the lesions parents and symptoms of itching.  She reports that over this 2 year.  Workup with mammograms have been performed at both [facility].  Both revealed no concerning imaging.    1 cup coffee and 1 soft drink daily. Nonsmoker. Using topical cream prn itching.     Patient does routinely do self breast exams.  Patient has noted a change on breast exam.  Patient denies nipple discharge. Patient denies to previous breast biopsy. Patient denies a personal history of breast cancer.      GYN History:  Age of menarche was 11. Hysterectomy 2018. Patient reports hormonal therapy but stopped in  due to PE. Patient is . Age of first live birth was 24.  Patient did not breast feed.    FAMILY history:  Negative for breast or GYN malignancy     Past Medical History:   Diagnosis Date    Anxiety     Depression     Lupus anticoagulant disorder     Lupus anticoagulant disorder 2019     No past surgical history on file.  Current Outpatient Medications on File Prior to Visit   Medication Sig Dispense Refill    alprazolam (XANAX) 0.25 MG tablet Take 0.25 mg by mouth 3 (three) times daily.      finasteride (PROSCAR) 5 mg tablet Take 5 mg by mouth once daily.      levothyroxine (SYNTHROID) 75 MCG tablet Take 1 tablet (75 mcg total) by mouth once daily. 90 tablet 3    liothyronine (CYTOMEL) 5 MCG Tab Take 10 mcg by mouth once daily. Take 1 tablet at 7 a.m.   Take 1 tablet at 11 a.m.      losartan (COZAAR) 50 MG tablet TK 1 T PO QD  1     mupirocin (BACTROBAN) 2 % ointment Apply topically 2 (two) times daily. 22 g 1    rosuvastatin (CRESTOR) 40 MG Tab Take 10 mg by mouth every evening.      vitamin D 1000 units Tab Take 5,000 Units by mouth once daily.       [DISCONTINUED] acetic acid-hydrocortisone (VOSOL-HC) otic solution Place in ear(s). 2  Otic Four times a day      [DISCONTINUED] amoxicillin-clavulanate 875-125mg (AUGMENTIN) 875-125 mg per tablet Take 1 tablet by mouth 2 (two) times daily. for 7 days 14 tablet 0    [DISCONTINUED] aspirin 81 MG Chew Take 81 mg by mouth once daily.       No current facility-administered medications on file prior to visit.      Social History     Socioeconomic History    Marital status:      Spouse name: Not on file    Number of children: Not on file    Years of education: Not on file    Highest education level: Not on file   Occupational History    Not on file   Social Needs    Financial resource strain: Not on file    Food insecurity:     Worry: Not on file     Inability: Not on file    Transportation needs:     Medical: Not on file     Non-medical: Not on file   Tobacco Use    Smoking status: Former Smoker    Smokeless tobacco: Never Used   Substance and Sexual Activity    Alcohol use: No    Drug use: No    Sexual activity: Not on file   Lifestyle    Physical activity:     Days per week: Not on file     Minutes per session: Not on file    Stress: Not on file   Relationships    Social connections:     Talks on phone: Not on file     Gets together: Not on file     Attends Mu-ism service: Not on file     Active member of club or organization: Not on file     Attends meetings of clubs or organizations: Not on file     Relationship status: Not on file   Other Topics Concern    Not on file   Social History Narrative    Not on file     Family History   Problem Relation Age of Onset    No Known Problems Mother     No Known Problems Father     No Known Problems Sister     No Known  "Problems Brother     No Known Problems Maternal Aunt     No Known Problems Maternal Uncle     No Known Problems Paternal Aunt     No Known Problems Paternal Uncle     No Known Problems Maternal Grandmother     No Known Problems Maternal Grandfather     No Known Problems Paternal Grandmother     No Known Problems Paternal Grandfather     Amblyopia Neg Hx     Blindness Neg Hx     Cancer Neg Hx     Cataracts Neg Hx     Diabetes Neg Hx     Glaucoma Neg Hx     Hypertension Neg Hx     Macular degeneration Neg Hx     Retinal detachment Neg Hx     Strabismus Neg Hx     Stroke Neg Hx     Thyroid disease Neg Hx        Review of Systems  Constitutional: negative  Eyes: negative  Ears, nose, mouth, throat, and face: negative  Respiratory: negative  Cardiovascular: negative  Gastrointestinal: negative  Genitourinary:negative  Integument/breast: positive for skin lesion(s)  Hematologic/lymphatic: negative  Musculoskeletal:negative  Neurological: negative       Objective:        /77 (BP Location: Right arm, Patient Position: Sitting, BP Method: Small (Automatic))   Pulse 83   Temp 98 °F (36.7 °C) (Oral)   Ht 5' 6" (1.676 m)   Wt 96.2 kg (212 lb 1.3 oz)   BMI 34.23 kg/m²   General appearance: alert, appears stated age and cooperative  Head: Normocephalic, without obvious abnormality, atraumatic  Neck: no adenopathy and supple, symmetrical, trachea midline  Lungs: normal effort, nonlabored breathing  Breasts: No nipple retraction or dimpling, No nipple discharge or bleeding, No axillary or supraclavicular adenopathy, positive findings: skin lesions to nipple areola complex, appear and hypercellular mounds of normal appearing tissue. Not classic paget's appearence. No scaling.   Heart: regular rate and rhythm  Abdomen: soft, non-tender; bowel sounds normal; no masses,  no organomegaly  Extremities: extremities normal, atraumatic, no cyanosis or edema  Skin: normal, no edema and no lesions noted  Lymph " nodes: Cervical, supraclavicular, and axillary nodes normal.  Neurologic: Grossly normal    Radiology review:   Prior mammograms performed at [facility] reviewed in Care everywhere.  Mammogram from December 2018 at [facility] unable to be reviewed.      Assessment:      Mable Redding is a 48 y.o. female with nipple/areola complex skin lesions     Plan:   Skin punch biopsy performed today in clinic.  See procedure note for full details.    Follow-up pending pathology results.  Discussed that if biopsies consistent with a dermal lesion, further workup can be addressed by Dermatology.    Symptoms are well controlled topical cream.  Instructed to call if symptoms change or worsen.    Over-the-counter NSAIDs for discomfort tonight.  Recommend compressive bra.      Roma Diaz

## 2019-08-21 ENCOUNTER — PATIENT MESSAGE (OUTPATIENT)
Dept: SURGERY | Facility: HOSPITAL | Age: 49
End: 2019-08-21

## 2019-11-07 ENCOUNTER — LAB VISIT (OUTPATIENT)
Dept: LAB | Facility: HOSPITAL | Age: 49
End: 2019-11-07
Attending: NURSE PRACTITIONER
Payer: COMMERCIAL

## 2019-11-07 DIAGNOSIS — R35.0 FREQUENCY OF URINATION: Primary | ICD-10-CM

## 2019-11-07 DIAGNOSIS — R35.0 FREQUENCY OF URINATION: ICD-10-CM

## 2019-11-07 LAB
BILIRUB UR QL STRIP: NEGATIVE
CLARITY UR: CLEAR
COLOR UR: YELLOW
GLUCOSE UR QL STRIP: NEGATIVE
HGB UR QL STRIP: NEGATIVE
KETONES UR QL STRIP: NEGATIVE
LEUKOCYTE ESTERASE UR QL STRIP: NEGATIVE
NITRITE UR QL STRIP: NEGATIVE
PH UR STRIP: 6 [PH] (ref 5–8)
PROT UR QL STRIP: NEGATIVE
SP GR UR STRIP: 1.02 (ref 1–1.03)
URN SPEC COLLECT METH UR: NORMAL
UROBILINOGEN UR STRIP-ACNC: NEGATIVE EU/DL

## 2019-11-07 PROCEDURE — 81003 URINALYSIS AUTO W/O SCOPE: CPT

## 2019-12-19 DIAGNOSIS — R60.0 LOWER EXTREMITY EDEMA: Primary | ICD-10-CM

## 2019-12-19 RX ORDER — HYDROCHLOROTHIAZIDE 25 MG/1
25 TABLET ORAL DAILY
Qty: 90 TABLET | Refills: 3 | Status: SHIPPED | OUTPATIENT
Start: 2019-12-19 | End: 2020-12-07

## 2019-12-19 NOTE — PROGRESS NOTES
Pt noted over past week mild edema in lower extremities- bp running 140/90 which is high for pt-  taking ibuprofen for headache and myalgias- will decrease nsaid use and try hctz for edema and bp-

## 2019-12-30 ENCOUNTER — OFFICE VISIT (OUTPATIENT)
Dept: OPHTHALMOLOGY | Facility: CLINIC | Age: 49
End: 2019-12-30
Payer: COMMERCIAL

## 2019-12-30 DIAGNOSIS — D31.32 CHOROIDAL NEVUS OF BOTH EYES: Primary | ICD-10-CM

## 2019-12-30 DIAGNOSIS — D31.31 CHOROIDAL NEVUS OF BOTH EYES: Primary | ICD-10-CM

## 2019-12-30 PROCEDURE — 99999 PR PBB SHADOW E&M-EST. PATIENT-LVL II: ICD-10-PCS | Mod: PBBFAC,,, | Performed by: OPHTHALMOLOGY

## 2019-12-30 PROCEDURE — 92014 PR EYE EXAM, EST PATIENT,COMPREHESV: ICD-10-PCS | Mod: S$GLB,,, | Performed by: OPHTHALMOLOGY

## 2019-12-30 PROCEDURE — 92014 COMPRE OPH EXAM EST PT 1/>: CPT | Mod: S$GLB,,, | Performed by: OPHTHALMOLOGY

## 2019-12-30 PROCEDURE — 99999 PR PBB SHADOW E&M-EST. PATIENT-LVL II: CPT | Mod: PBBFAC,,, | Performed by: OPHTHALMOLOGY

## 2019-12-30 RX ORDER — HYDROCORTISONE VALERATE CREAM 2 MG/G
CREAM TOPICAL
COMMUNITY
Start: 2019-12-09 | End: 2023-01-19

## 2019-12-30 RX ORDER — CLOTRIMAZOLE AND BETAMETHASONE DIPROPIONATE 10; .64 MG/G; MG/G
CREAM TOPICAL
Refills: 1 | COMMUNITY
Start: 2019-10-19 | End: 2023-01-19

## 2019-12-30 RX ORDER — LEVOTHYROXINE SODIUM 88 UG/1
TABLET ORAL
COMMUNITY
Start: 2019-12-22 | End: 2021-06-15 | Stop reason: ALTCHOICE

## 2019-12-30 RX ORDER — ROSUVASTATIN CALCIUM 20 MG/1
TABLET, COATED ORAL
COMMUNITY
Start: 2019-11-22 | End: 2021-09-23

## 2019-12-30 NOTE — PROGRESS NOTES
===============================  Mable Redding,  12/30/2019 today   49 y.o. female   Last visit [facility]: :9/8/2017   Last visit eye dept. Visit date not found  [facility]:  Uncorrected distance visual acuity was 20/20 in the right eye and 20/20 in the left eye.  Tonometry     Tonometry (Applanation, 2:57 PM)       Right Left    Pressure 14 14               Not recorded         Not recorded         Not recorded        Chief Complaint   Patient presents with    choroidal nevus of both eyes     yearly exam       ________________  12/30/2019 today  HPI     choroidal nevus of both eyes      Additional comments: yearly exam              Comments     Choroidal nevus ou  No sx's          Last edited by MIGUEL Wise MD on 12/30/2019  3:49 PM. (History)      Problem List Items Addressed This Visit        Eye/Vision problems    Choroidal nevus of both eyes - Primary        .recommend +1.50 otc readers  Stable bilateral nevus  rtc 1 year repeat optos       ===========================

## 2020-01-03 NOTE — PROGRESS NOTES
Patient c/o cough 2 weeks, nasal congestion, upper/thoracic back pain. Hx of subsegmental atelectasis of the right lung. CT to rule out pneumonia.

## 2020-01-06 ENCOUNTER — HOSPITAL ENCOUNTER (OUTPATIENT)
Dept: RADIOLOGY | Facility: HOSPITAL | Age: 50
Discharge: HOME | End: 2020-01-06
Attending: NURSE PRACTITIONER
Payer: COMMERCIAL

## 2020-01-06 DIAGNOSIS — R05.9 COUGH: ICD-10-CM

## 2020-01-06 DIAGNOSIS — M89.8X1 CHRONIC SCAPULAR PAIN: ICD-10-CM

## 2020-01-06 DIAGNOSIS — G89.29 CHRONIC SCAPULAR PAIN: ICD-10-CM

## 2020-01-06 DIAGNOSIS — R05.9 COUGH: Primary | ICD-10-CM

## 2020-01-06 PROCEDURE — 71046 X-RAY EXAM CHEST 2 VIEWS: CPT | Mod: TC

## 2020-02-04 DIAGNOSIS — Z20.828 EXPOSURE TO THE FLU: Primary | ICD-10-CM

## 2020-02-04 RX ORDER — OSELTAMIVIR PHOSPHATE 75 MG/1
75 CAPSULE ORAL 2 TIMES DAILY
Qty: 20 CAPSULE | Refills: 0 | Status: SHIPPED | OUTPATIENT
Start: 2020-02-04 | End: 2020-02-14

## 2020-02-10 DIAGNOSIS — J01.90 ACUTE SINUSITIS, RECURRENCE NOT SPECIFIED, UNSPECIFIED LOCATION: Primary | ICD-10-CM

## 2020-02-10 RX ORDER — AMOXICILLIN AND CLAVULANATE POTASSIUM 875; 125 MG/1; MG/1
1 TABLET, FILM COATED ORAL 2 TIMES DAILY
Qty: 6 TABLET | Refills: 0 | Status: SHIPPED | OUTPATIENT
Start: 2020-02-10 | End: 2020-02-13

## 2020-02-14 DIAGNOSIS — J01.90 ACUTE SINUSITIS WITH SYMPTOMS > 10 DAYS: Primary | ICD-10-CM

## 2020-02-14 RX ORDER — AMOXICILLIN AND CLAVULANATE POTASSIUM 875; 125 MG/1; MG/1
1 TABLET, FILM COATED ORAL 2 TIMES DAILY
Qty: 14 TABLET | Refills: 0 | Status: SHIPPED | OUTPATIENT
Start: 2020-02-14 | End: 2020-02-21

## 2020-02-14 NOTE — PROGRESS NOTES
Patient nearly finished with 10 day supply of Augmentin for Acute Sinusitis, cough, tonsillitis. Patient will benefit from a full 14 day cycle of Augmentin to clear infection.

## 2020-03-26 DIAGNOSIS — J34.89 NASAL LESION: ICD-10-CM

## 2020-03-27 RX ORDER — MUPIROCIN 20 MG/G
OINTMENT TOPICAL 2 TIMES DAILY
Qty: 22 G | Refills: 2 | Status: SHIPPED | OUTPATIENT
Start: 2020-03-27 | End: 2021-04-27 | Stop reason: SDUPTHER

## 2020-04-08 DIAGNOSIS — I10 HYPERTENSION, UNSPECIFIED TYPE: Primary | ICD-10-CM

## 2020-04-09 ENCOUNTER — LAB VISIT (OUTPATIENT)
Dept: LAB | Facility: HOSPITAL | Age: 50
End: 2020-04-09
Attending: NURSE PRACTITIONER
Payer: COMMERCIAL

## 2020-04-09 DIAGNOSIS — I10 HYPERTENSION, UNSPECIFIED TYPE: ICD-10-CM

## 2020-04-09 LAB
ANION GAP SERPL CALC-SCNC: 10 MMOL/L (ref 8–16)
BUN SERPL-MCNC: 13 MG/DL (ref 6–20)
CALCIUM SERPL-MCNC: 10 MG/DL (ref 8.7–10.5)
CHLORIDE SERPL-SCNC: 104 MMOL/L (ref 95–110)
CO2 SERPL-SCNC: 27 MMOL/L (ref 23–29)
CREAT SERPL-MCNC: 0.9 MG/DL (ref 0.5–1.4)
EST. GFR  (AFRICAN AMERICAN): >60 ML/MIN/1.73 M^2
EST. GFR  (NON AFRICAN AMERICAN): >60 ML/MIN/1.73 M^2
GLUCOSE SERPL-MCNC: 86 MG/DL (ref 70–110)
POTASSIUM SERPL-SCNC: 3.7 MMOL/L (ref 3.5–5.1)
SODIUM SERPL-SCNC: 141 MMOL/L (ref 136–145)

## 2020-04-09 PROCEDURE — 80048 BASIC METABOLIC PNL TOTAL CA: CPT

## 2020-04-09 PROCEDURE — 36415 COLL VENOUS BLD VENIPUNCTURE: CPT

## 2020-04-21 DIAGNOSIS — Z01.84 ANTIBODY RESPONSE EXAMINATION: ICD-10-CM

## 2020-04-22 ENCOUNTER — LAB VISIT (OUTPATIENT)
Dept: LAB | Facility: HOSPITAL | Age: 50
End: 2020-04-22
Attending: INTERNAL MEDICINE
Payer: COMMERCIAL

## 2020-04-22 DIAGNOSIS — Z01.84 ANTIBODY RESPONSE EXAMINATION: ICD-10-CM

## 2020-04-22 LAB — SARS-COV-2 IGG SERPL QL IA: NEGATIVE

## 2020-04-22 PROCEDURE — 86769 SARS-COV-2 COVID-19 ANTIBODY: CPT

## 2020-04-22 PROCEDURE — 36415 COLL VENOUS BLD VENIPUNCTURE: CPT

## 2020-08-24 RX ORDER — FLUCONAZOLE 150 MG/1
150 TABLET ORAL ONCE
Qty: 1 TABLET | Refills: 0 | Status: SHIPPED | OUTPATIENT
Start: 2020-08-24 | End: 2020-08-25

## 2020-08-24 RX ORDER — CLINDAMYCIN HYDROCHLORIDE 300 MG/1
300 CAPSULE ORAL 3 TIMES DAILY
Qty: 30 CAPSULE | Refills: 0 | Status: SHIPPED | OUTPATIENT
Start: 2020-08-24 | End: 2020-09-03

## 2020-08-26 DIAGNOSIS — J30.2 SEASONAL ALLERGIES: Primary | ICD-10-CM

## 2020-08-26 RX ORDER — FLUTICASONE PROPIONATE 50 MCG
1 SPRAY, SUSPENSION (ML) NASAL DAILY
Qty: 16 G | Refills: 3 | Status: ON HOLD | OUTPATIENT
Start: 2020-08-26 | End: 2022-05-27 | Stop reason: HOSPADM

## 2020-09-22 ENCOUNTER — LAB VISIT (OUTPATIENT)
Dept: LAB | Facility: HOSPITAL | Age: 50
End: 2020-09-22
Payer: COMMERCIAL

## 2020-09-22 DIAGNOSIS — R30.0 DYSURIA: ICD-10-CM

## 2020-09-22 DIAGNOSIS — R30.0 DYSURIA: Primary | ICD-10-CM

## 2020-09-22 LAB
BILIRUB UR QL STRIP: NEGATIVE
CLARITY UR: CLEAR
COLOR UR: YELLOW
GLUCOSE UR QL STRIP: NEGATIVE
HGB UR QL STRIP: NEGATIVE
KETONES UR QL STRIP: NEGATIVE
LEUKOCYTE ESTERASE UR QL STRIP: NEGATIVE
NITRITE UR QL STRIP: NEGATIVE
PH UR STRIP: 6 [PH] (ref 5–8)
PROT UR QL STRIP: NEGATIVE
SP GR UR STRIP: 1.02 (ref 1–1.03)
URN SPEC COLLECT METH UR: NORMAL

## 2020-09-22 PROCEDURE — 81003 URINALYSIS AUTO W/O SCOPE: CPT

## 2020-10-13 ENCOUNTER — PATIENT MESSAGE (OUTPATIENT)
Dept: CARDIOLOGY | Facility: CLINIC | Age: 50
End: 2020-10-13

## 2020-10-13 ENCOUNTER — TELEPHONE (OUTPATIENT)
Dept: HEMATOLOGY/ONCOLOGY | Facility: CLINIC | Age: 50
End: 2020-10-13

## 2020-10-13 ENCOUNTER — HOSPITAL ENCOUNTER (OUTPATIENT)
Dept: CARDIOLOGY | Facility: HOSPITAL | Age: 50
Discharge: HOME | End: 2020-10-13
Payer: COMMERCIAL

## 2020-10-13 DIAGNOSIS — R00.2 PALPITATIONS: Primary | ICD-10-CM

## 2020-10-13 DIAGNOSIS — R00.2 PALPITATIONS: ICD-10-CM

## 2020-10-13 DIAGNOSIS — R00.2 PALPITATION: Primary | ICD-10-CM

## 2020-10-13 PROCEDURE — 93010 ELECTROCARDIOGRAM REPORT: CPT | Mod: ,,, | Performed by: INTERNAL MEDICINE

## 2020-10-13 PROCEDURE — 93005 ELECTROCARDIOGRAM TRACING: CPT

## 2020-10-13 PROCEDURE — 93010 EKG 12-LEAD: ICD-10-PCS | Mod: ,,, | Performed by: INTERNAL MEDICINE

## 2020-10-27 ENCOUNTER — HOSPITAL ENCOUNTER (OUTPATIENT)
Dept: CARDIOLOGY | Facility: HOSPITAL | Age: 50
Discharge: HOME | End: 2020-10-27
Attending: INTERNAL MEDICINE
Payer: COMMERCIAL

## 2020-10-27 DIAGNOSIS — R00.2 PALPITATION: ICD-10-CM

## 2020-10-27 PROCEDURE — 0296T CV CARDIAC MONITOR - 3-14 DAY ADULT (CUPID ONLY): CPT | Mod: ,,, | Performed by: INTERNAL MEDICINE

## 2020-10-27 PROCEDURE — 0298T CV CARDIAC MONITOR - 3-14 DAY ADULT (CUPID ONLY): CPT | Mod: ,,, | Performed by: INTERNAL MEDICINE

## 2020-10-27 PROCEDURE — 0296T CV CARDIAC MONITOR - 3-14 DAY ADULT (CUPID ONLY): ICD-10-PCS | Mod: ,,, | Performed by: INTERNAL MEDICINE

## 2020-10-27 PROCEDURE — 0298T CV CARDIAC MONITOR - 3-14 DAY ADULT (CUPID ONLY): ICD-10-PCS | Mod: ,,, | Performed by: INTERNAL MEDICINE

## 2020-11-09 ENCOUNTER — HOSPITAL ENCOUNTER (OUTPATIENT)
Dept: RADIOLOGY | Facility: HOSPITAL | Age: 50
Discharge: HOME | End: 2020-11-09
Attending: INTERNAL MEDICINE
Payer: COMMERCIAL

## 2020-11-09 DIAGNOSIS — R10.11 ABDOMINAL PAIN, RIGHT UPPER QUADRANT: ICD-10-CM

## 2020-11-09 PROCEDURE — 74176 CT ABD & PELVIS W/O CONTRAST: CPT | Mod: 26,,, | Performed by: RADIOLOGY

## 2020-11-09 PROCEDURE — 25500020 PHARM REV CODE 255

## 2020-11-09 PROCEDURE — 74176 CT ABD & PELVIS W/O CONTRAST: CPT | Mod: TC

## 2020-11-09 PROCEDURE — 74176 CT ABDOMEN PELVIS WITHOUT CONTRAST: ICD-10-PCS | Mod: 26,,, | Performed by: RADIOLOGY

## 2020-11-09 RX ADMIN — IOHEXOL 1000 ML: 12 SOLUTION ORAL at 07:11

## 2020-11-11 ENCOUNTER — TELEPHONE (OUTPATIENT)
Dept: CARDIOLOGY | Facility: CLINIC | Age: 50
End: 2020-11-11

## 2020-11-11 NOTE — TELEPHONE ENCOUNTER
MD CATALINA Sewell Staff             Minimal skipped beats no significant rhythm issues     Patient was notified of results. All questions were answered. Pt verbalized understanding. Pt will call back with any other questions or concerns.

## 2021-01-06 ENCOUNTER — OFFICE VISIT (OUTPATIENT)
Dept: PODIATRY | Facility: CLINIC | Age: 51
End: 2021-01-06
Payer: COMMERCIAL

## 2021-01-06 VITALS
SYSTOLIC BLOOD PRESSURE: 108 MMHG | DIASTOLIC BLOOD PRESSURE: 86 MMHG | HEIGHT: 66 IN | BODY MASS INDEX: 34.15 KG/M2 | WEIGHT: 212.5 LBS | HEART RATE: 78 BPM

## 2021-01-06 DIAGNOSIS — M77.42 METATARSALGIA, LEFT FOOT: ICD-10-CM

## 2021-01-06 DIAGNOSIS — M62.461 GASTROCNEMIUS EQUINUS OF RIGHT LOWER EXTREMITY: ICD-10-CM

## 2021-01-06 DIAGNOSIS — M77.41 METATARSALGIA OF RIGHT FOOT: ICD-10-CM

## 2021-01-06 DIAGNOSIS — M67.479 DIGITAL MUCOUS CYST OF TOE: Primary | ICD-10-CM

## 2021-01-06 DIAGNOSIS — M62.462 GASTROCNEMIUS EQUINUS OF LEFT LOWER EXTREMITY: ICD-10-CM

## 2021-01-06 PROCEDURE — 99204 OFFICE O/P NEW MOD 45 MIN: CPT | Mod: S$GLB,,, | Performed by: PODIATRIST

## 2021-01-06 PROCEDURE — 3008F PR BODY MASS INDEX (BMI) DOCUMENTED: ICD-10-PCS | Mod: CPTII,S$GLB,, | Performed by: PODIATRIST

## 2021-01-06 PROCEDURE — 3079F DIAST BP 80-89 MM HG: CPT | Mod: CPTII,S$GLB,, | Performed by: PODIATRIST

## 2021-01-06 PROCEDURE — 99999 PR PBB SHADOW E&M-EST. PATIENT-LVL III: CPT | Mod: PBBFAC,,, | Performed by: PODIATRIST

## 2021-01-06 PROCEDURE — 3079F PR MOST RECENT DIASTOLIC BLOOD PRESSURE 80-89 MM HG: ICD-10-PCS | Mod: CPTII,S$GLB,, | Performed by: PODIATRIST

## 2021-01-06 PROCEDURE — 3008F BODY MASS INDEX DOCD: CPT | Mod: CPTII,S$GLB,, | Performed by: PODIATRIST

## 2021-01-06 PROCEDURE — 3074F SYST BP LT 130 MM HG: CPT | Mod: CPTII,S$GLB,, | Performed by: PODIATRIST

## 2021-01-06 PROCEDURE — 99999 PR PBB SHADOW E&M-EST. PATIENT-LVL III: ICD-10-PCS | Mod: PBBFAC,,, | Performed by: PODIATRIST

## 2021-01-06 PROCEDURE — 99204 PR OFFICE/OUTPT VISIT, NEW, LEVL IV, 45-59 MIN: ICD-10-PCS | Mod: S$GLB,,, | Performed by: PODIATRIST

## 2021-01-06 PROCEDURE — 3074F PR MOST RECENT SYSTOLIC BLOOD PRESSURE < 130 MM HG: ICD-10-PCS | Mod: CPTII,S$GLB,, | Performed by: PODIATRIST

## 2021-01-06 RX ORDER — LEVOTHYROXINE, LIOTHYRONINE 57; 13.5 UG/1; UG/1
150 TABLET ORAL
COMMUNITY
Start: 2020-10-25 | End: 2023-05-09

## 2021-01-06 RX ORDER — EZETIMIBE 10 MG/1
10 TABLET ORAL NIGHTLY
COMMUNITY
Start: 2020-11-30 | End: 2023-05-09

## 2021-04-27 DIAGNOSIS — H92.02 OTALGIA OF LEFT EAR: ICD-10-CM

## 2021-04-27 DIAGNOSIS — G47.9 DISTURBANCE OF SLEEP: Primary | ICD-10-CM

## 2021-04-27 DIAGNOSIS — N95.1 HOT FLASHES DUE TO MENOPAUSE: ICD-10-CM

## 2021-04-27 DIAGNOSIS — B37.31 VAGINAL CANDIDIASIS: Primary | ICD-10-CM

## 2021-04-27 DIAGNOSIS — H93.8X2 EAR SWELLING, LEFT: Primary | ICD-10-CM

## 2021-04-27 DIAGNOSIS — H93.8X2 EAR SWELLING, LEFT: ICD-10-CM

## 2021-04-27 DIAGNOSIS — J34.89 NASAL LESION: ICD-10-CM

## 2021-04-27 RX ORDER — VENLAFAXINE HYDROCHLORIDE 37.5 MG/1
37.5 CAPSULE, EXTENDED RELEASE ORAL DAILY
Qty: 30 CAPSULE | Refills: 11 | Status: SHIPPED | OUTPATIENT
Start: 2021-04-27 | End: 2021-05-18 | Stop reason: SDUPTHER

## 2021-04-27 RX ORDER — FLUCONAZOLE 150 MG/1
150 TABLET ORAL ONCE
Qty: 1 TABLET | Refills: 0 | Status: SHIPPED | OUTPATIENT
Start: 2021-04-27 | End: 2021-04-28

## 2021-04-27 RX ORDER — AMOXICILLIN AND CLAVULANATE POTASSIUM 875; 125 MG/1; MG/1
1 TABLET, FILM COATED ORAL 2 TIMES DAILY
Qty: 20 TABLET | Refills: 0 | Status: SHIPPED | OUTPATIENT
Start: 2021-04-27 | End: 2021-04-27 | Stop reason: SDUPTHER

## 2021-04-27 RX ORDER — AMOXICILLIN AND CLAVULANATE POTASSIUM 875; 125 MG/1; MG/1
1 TABLET, FILM COATED ORAL 2 TIMES DAILY
Qty: 20 TABLET | Refills: 0 | Status: SHIPPED | OUTPATIENT
Start: 2021-04-27 | End: 2021-05-07

## 2021-04-27 RX ORDER — MUPIROCIN 20 MG/G
OINTMENT TOPICAL 2 TIMES DAILY
Qty: 22 G | Refills: 2 | Status: SHIPPED | OUTPATIENT
Start: 2021-04-27

## 2021-05-18 DIAGNOSIS — G47.9 DISTURBANCE OF SLEEP: ICD-10-CM

## 2021-05-18 DIAGNOSIS — N95.1 HOT FLASHES DUE TO MENOPAUSE: ICD-10-CM

## 2021-05-18 RX ORDER — VENLAFAXINE HYDROCHLORIDE 37.5 MG/1
37.5 CAPSULE, EXTENDED RELEASE ORAL DAILY
Qty: 90 CAPSULE | Refills: 3 | Status: SHIPPED | OUTPATIENT
Start: 2021-05-18 | End: 2021-09-17 | Stop reason: SDUPTHER

## 2021-06-24 ENCOUNTER — HOSPITAL ENCOUNTER (OUTPATIENT)
Dept: RADIOLOGY | Facility: HOSPITAL | Age: 51
Discharge: HOME | End: 2021-06-24
Attending: INTERNAL MEDICINE
Payer: COMMERCIAL

## 2021-06-24 DIAGNOSIS — M54.2 NECK PAIN: ICD-10-CM

## 2021-06-24 PROCEDURE — 72040 X-RAY EXAM NECK SPINE 2-3 VW: CPT | Mod: TC

## 2021-06-24 PROCEDURE — 72040 X-RAY EXAM NECK SPINE 2-3 VW: CPT | Mod: 26,,, | Performed by: RADIOLOGY

## 2021-06-24 PROCEDURE — 72040 XR CERVICAL SPINE AP LATERAL: ICD-10-PCS | Mod: 26,,, | Performed by: RADIOLOGY

## 2021-07-06 ENCOUNTER — OFFICE VISIT (OUTPATIENT)
Dept: OPHTHALMOLOGY | Facility: CLINIC | Age: 51
End: 2021-07-06
Payer: COMMERCIAL

## 2021-07-06 DIAGNOSIS — D31.31 CHOROIDAL NEVUS OF BOTH EYES: Primary | ICD-10-CM

## 2021-07-06 DIAGNOSIS — D31.32 CHOROIDAL NEVUS OF BOTH EYES: Primary | ICD-10-CM

## 2021-07-06 PROCEDURE — 92134 POSTERIOR SEGMENT OCT RETINA (OCULAR COHERENCE TOMOGRAPHY)-BOTH EYES: ICD-10-PCS | Mod: S$GLB,,, | Performed by: OPHTHALMOLOGY

## 2021-07-06 PROCEDURE — 99213 OFFICE O/P EST LOW 20 MIN: CPT | Mod: S$GLB,,, | Performed by: OPHTHALMOLOGY

## 2021-07-06 PROCEDURE — 99999 PR PBB SHADOW E&M-EST. PATIENT-LVL III: ICD-10-PCS | Mod: PBBFAC,,, | Performed by: OPHTHALMOLOGY

## 2021-07-06 PROCEDURE — 99999 PR PBB SHADOW E&M-EST. PATIENT-LVL III: CPT | Mod: PBBFAC,,, | Performed by: OPHTHALMOLOGY

## 2021-07-06 PROCEDURE — 1126F PR PAIN SEVERITY QUANTIFIED, NO PAIN PRESENT: ICD-10-PCS | Mod: S$GLB,,, | Performed by: OPHTHALMOLOGY

## 2021-07-06 PROCEDURE — 92134 CPTRZ OPH DX IMG PST SGM RTA: CPT | Mod: S$GLB,,, | Performed by: OPHTHALMOLOGY

## 2021-07-06 PROCEDURE — 99213 PR OFFICE/OUTPT VISIT, EST, LEVL III, 20-29 MIN: ICD-10-PCS | Mod: S$GLB,,, | Performed by: OPHTHALMOLOGY

## 2021-07-06 PROCEDURE — 1126F AMNT PAIN NOTED NONE PRSNT: CPT | Mod: S$GLB,,, | Performed by: OPHTHALMOLOGY

## 2021-07-09 DIAGNOSIS — L21.9 SEBORRHEIC ECZEMA OF SCALP: ICD-10-CM

## 2021-07-09 DIAGNOSIS — C43.4 MALIGNANT MELANOMA OF SKIN OF SCALP AND NECK: Primary | ICD-10-CM

## 2021-07-09 RX ORDER — CLOBETASOL PROPIONATE 0.05 G/100ML
SHAMPOO TOPICAL DAILY
Qty: 118 ML | Refills: 2 | Status: SHIPPED | OUTPATIENT
Start: 2021-07-09 | End: 2021-09-22

## 2021-08-02 ENCOUNTER — HOSPITAL ENCOUNTER (OUTPATIENT)
Dept: RADIOLOGY | Facility: HOSPITAL | Age: 51
Discharge: HOME | End: 2021-08-02
Attending: NURSE PRACTITIONER
Payer: COMMERCIAL

## 2021-08-02 DIAGNOSIS — M25.562 ACUTE PAIN OF LEFT KNEE: ICD-10-CM

## 2021-08-02 DIAGNOSIS — M25.562 ACUTE PAIN OF LEFT KNEE: Primary | ICD-10-CM

## 2021-08-02 PROCEDURE — 73560 X-RAY EXAM OF KNEE 1 OR 2: CPT | Mod: 26,RT,, | Performed by: RADIOLOGY

## 2021-08-02 PROCEDURE — 73562 XR KNEE ORTHO LEFT: ICD-10-PCS | Mod: 26,LT,, | Performed by: RADIOLOGY

## 2021-08-02 PROCEDURE — 73560 X-RAY EXAM OF KNEE 1 OR 2: CPT | Mod: TC,RT,59

## 2021-08-02 PROCEDURE — 73560 XR KNEE ORTHO LEFT: ICD-10-PCS | Mod: 26,RT,, | Performed by: RADIOLOGY

## 2021-08-02 PROCEDURE — 73562 X-RAY EXAM OF KNEE 3: CPT | Mod: 26,LT,, | Performed by: RADIOLOGY

## 2021-08-19 ENCOUNTER — OFFICE VISIT (OUTPATIENT)
Dept: ORTHOPEDICS | Facility: CLINIC | Age: 51
End: 2021-08-19
Payer: COMMERCIAL

## 2021-08-19 ENCOUNTER — TELEPHONE (OUTPATIENT)
Dept: ORTHOPEDICS | Facility: CLINIC | Age: 51
End: 2021-08-19

## 2021-08-19 VITALS
WEIGHT: 210 LBS | BODY MASS INDEX: 33.89 KG/M2 | SYSTOLIC BLOOD PRESSURE: 121 MMHG | HEART RATE: 79 BPM | DIASTOLIC BLOOD PRESSURE: 82 MMHG

## 2021-08-19 DIAGNOSIS — M25.462 PAIN AND SWELLING OF LEFT KNEE: ICD-10-CM

## 2021-08-19 DIAGNOSIS — S83.512A RUPTURE OF ANTERIOR CRUCIATE LIGAMENT OF LEFT KNEE, INITIAL ENCOUNTER: Primary | ICD-10-CM

## 2021-08-19 DIAGNOSIS — R26.9 GAIT ABNORMALITY: ICD-10-CM

## 2021-08-19 DIAGNOSIS — M25.562 PAIN AND SWELLING OF LEFT KNEE: ICD-10-CM

## 2021-08-19 PROCEDURE — 3074F SYST BP LT 130 MM HG: CPT | Mod: CPTII,S$GLB,, | Performed by: PHYSICAL MEDICINE & REHABILITATION

## 2021-08-19 PROCEDURE — 3074F PR MOST RECENT SYSTOLIC BLOOD PRESSURE < 130 MM HG: ICD-10-PCS | Mod: CPTII,S$GLB,, | Performed by: PHYSICAL MEDICINE & REHABILITATION

## 2021-08-19 PROCEDURE — 1160F RVW MEDS BY RX/DR IN RCRD: CPT | Mod: CPTII,S$GLB,, | Performed by: PHYSICAL MEDICINE & REHABILITATION

## 2021-08-19 PROCEDURE — 1125F PR PAIN SEVERITY QUANTIFIED, PAIN PRESENT: ICD-10-PCS | Mod: CPTII,S$GLB,, | Performed by: PHYSICAL MEDICINE & REHABILITATION

## 2021-08-19 PROCEDURE — 99204 PR OFFICE/OUTPT VISIT, NEW, LEVL IV, 45-59 MIN: ICD-10-PCS | Mod: S$GLB,,, | Performed by: PHYSICAL MEDICINE & REHABILITATION

## 2021-08-19 PROCEDURE — 1159F PR MEDICATION LIST DOCUMENTED IN MEDICAL RECORD: ICD-10-PCS | Mod: CPTII,S$GLB,, | Performed by: PHYSICAL MEDICINE & REHABILITATION

## 2021-08-19 PROCEDURE — 1160F PR REVIEW ALL MEDS BY PRESCRIBER/CLIN PHARMACIST DOCUMENTED: ICD-10-PCS | Mod: CPTII,S$GLB,, | Performed by: PHYSICAL MEDICINE & REHABILITATION

## 2021-08-19 PROCEDURE — 1159F MED LIST DOCD IN RCRD: CPT | Mod: CPTII,S$GLB,, | Performed by: PHYSICAL MEDICINE & REHABILITATION

## 2021-08-19 PROCEDURE — 3079F PR MOST RECENT DIASTOLIC BLOOD PRESSURE 80-89 MM HG: ICD-10-PCS | Mod: CPTII,S$GLB,, | Performed by: PHYSICAL MEDICINE & REHABILITATION

## 2021-08-19 PROCEDURE — 1125F AMNT PAIN NOTED PAIN PRSNT: CPT | Mod: CPTII,S$GLB,, | Performed by: PHYSICAL MEDICINE & REHABILITATION

## 2021-08-19 PROCEDURE — 99999 PR PBB SHADOW E&M-EST. PATIENT-LVL IV: ICD-10-PCS | Mod: PBBFAC,,, | Performed by: PHYSICAL MEDICINE & REHABILITATION

## 2021-08-19 PROCEDURE — 99999 PR PBB SHADOW E&M-EST. PATIENT-LVL IV: CPT | Mod: PBBFAC,,, | Performed by: PHYSICAL MEDICINE & REHABILITATION

## 2021-08-19 PROCEDURE — 3008F PR BODY MASS INDEX (BMI) DOCUMENTED: ICD-10-PCS | Mod: CPTII,S$GLB,, | Performed by: PHYSICAL MEDICINE & REHABILITATION

## 2021-08-19 PROCEDURE — 99204 OFFICE O/P NEW MOD 45 MIN: CPT | Mod: S$GLB,,, | Performed by: PHYSICAL MEDICINE & REHABILITATION

## 2021-08-19 PROCEDURE — 3008F BODY MASS INDEX DOCD: CPT | Mod: CPTII,S$GLB,, | Performed by: PHYSICAL MEDICINE & REHABILITATION

## 2021-08-19 PROCEDURE — 3079F DIAST BP 80-89 MM HG: CPT | Mod: CPTII,S$GLB,, | Performed by: PHYSICAL MEDICINE & REHABILITATION

## 2021-08-25 ENCOUNTER — TELEPHONE (OUTPATIENT)
Dept: ORTHOPEDICS | Facility: CLINIC | Age: 51
End: 2021-08-25

## 2021-09-14 ENCOUNTER — PATIENT MESSAGE (OUTPATIENT)
Dept: SPORTS MEDICINE | Facility: CLINIC | Age: 51
End: 2021-09-14

## 2021-09-17 DIAGNOSIS — G47.9 DISTURBANCE OF SLEEP: ICD-10-CM

## 2021-09-17 DIAGNOSIS — N95.1 HOT FLASHES DUE TO MENOPAUSE: ICD-10-CM

## 2021-09-17 RX ORDER — VENLAFAXINE HYDROCHLORIDE 75 MG/1
75 CAPSULE, EXTENDED RELEASE ORAL DAILY
Qty: 90 CAPSULE | Refills: 3 | Status: SHIPPED | OUTPATIENT
Start: 2021-09-17 | End: 2022-04-27 | Stop reason: SDUPTHER

## 2021-09-21 ENCOUNTER — TELEPHONE (OUTPATIENT)
Dept: SPORTS MEDICINE | Facility: CLINIC | Age: 51
End: 2021-09-21

## 2021-09-21 DIAGNOSIS — S83.242A ACUTE MEDIAL MENISCUS TEAR OF LEFT KNEE, INITIAL ENCOUNTER: Primary | ICD-10-CM

## 2021-09-23 ENCOUNTER — OFFICE VISIT (OUTPATIENT)
Dept: ORTHOPEDICS | Facility: CLINIC | Age: 51
End: 2021-09-23
Payer: COMMERCIAL

## 2021-09-23 VITALS — WEIGHT: 210 LBS | HEIGHT: 65 IN | BODY MASS INDEX: 34.99 KG/M2

## 2021-09-23 DIAGNOSIS — S83.242A ACUTE MEDIAL MENISCUS TEAR OF LEFT KNEE, INITIAL ENCOUNTER: ICD-10-CM

## 2021-09-23 PROCEDURE — 99203 OFFICE O/P NEW LOW 30 MIN: CPT | Mod: S$GLB,,, | Performed by: ORTHOPAEDIC SURGERY

## 2021-09-23 PROCEDURE — 99203 PR OFFICE/OUTPT VISIT, NEW, LEVL III, 30-44 MIN: ICD-10-PCS | Mod: S$GLB,,, | Performed by: ORTHOPAEDIC SURGERY

## 2021-09-23 PROCEDURE — 99999 PR PBB SHADOW E&M-EST. PATIENT-LVL IV: CPT | Mod: PBBFAC,,, | Performed by: ORTHOPAEDIC SURGERY

## 2021-09-23 PROCEDURE — 99999 PR PBB SHADOW E&M-EST. PATIENT-LVL IV: ICD-10-PCS | Mod: PBBFAC,,, | Performed by: ORTHOPAEDIC SURGERY

## 2021-09-23 RX ORDER — PANTOPRAZOLE SODIUM 40 MG/1
40 TABLET, DELAYED RELEASE ORAL NIGHTLY
COMMUNITY
Start: 2021-07-05 | End: 2023-06-19 | Stop reason: SDUPTHER

## 2021-09-23 RX ORDER — ASPIRIN 81 MG/1
81 TABLET ORAL DAILY
Status: ON HOLD | COMMUNITY
End: 2022-05-27 | Stop reason: HOSPADM

## 2021-09-23 RX ORDER — LOSARTAN POTASSIUM 100 MG/1
100 TABLET ORAL NIGHTLY
COMMUNITY
Start: 2021-09-05 | End: 2023-06-19 | Stop reason: SDUPTHER

## 2021-09-23 RX ORDER — ROSUVASTATIN CALCIUM 40 MG/1
20 TABLET, COATED ORAL NIGHTLY
COMMUNITY
End: 2023-05-09 | Stop reason: SDUPTHER

## 2021-09-23 RX ORDER — LORATADINE 10 MG/1
10 TABLET ORAL DAILY PRN
COMMUNITY
End: 2023-06-19 | Stop reason: SDUPTHER

## 2021-09-23 RX ORDER — DOCUSATE SODIUM 100 MG/1
300 CAPSULE, LIQUID FILLED ORAL
COMMUNITY
End: 2023-03-28

## 2021-09-23 RX ORDER — IVERMECTIN 3 MG/1
TABLET ORAL
COMMUNITY
Start: 2021-09-07 | End: 2023-01-19

## 2021-10-01 ENCOUNTER — CLINICAL SUPPORT (OUTPATIENT)
Dept: REHABILITATION | Facility: HOSPITAL | Age: 51
End: 2021-10-01
Attending: ORTHOPAEDIC SURGERY
Payer: COMMERCIAL

## 2021-10-01 DIAGNOSIS — R53.1 DECREASED STRENGTH, ENDURANCE, AND MOBILITY: ICD-10-CM

## 2021-10-01 DIAGNOSIS — S83.242A ACUTE MEDIAL MENISCUS TEAR OF LEFT KNEE, INITIAL ENCOUNTER: ICD-10-CM

## 2021-10-01 DIAGNOSIS — Z74.09 DECREASED STRENGTH, ENDURANCE, AND MOBILITY: ICD-10-CM

## 2021-10-01 DIAGNOSIS — R68.89 DECREASED STRENGTH, ENDURANCE, AND MOBILITY: ICD-10-CM

## 2021-10-01 PROCEDURE — 97110 THERAPEUTIC EXERCISES: CPT

## 2021-10-01 PROCEDURE — 97161 PT EVAL LOW COMPLEX 20 MIN: CPT

## 2021-10-03 RX ORDER — METHYLPREDNISOLONE 4 MG/1
TABLET ORAL
Qty: 1 PACKAGE | Refills: 0 | Status: SHIPPED | OUTPATIENT
Start: 2021-10-03 | End: 2021-10-24

## 2021-10-04 ENCOUNTER — OFFICE VISIT (OUTPATIENT)
Dept: URGENT CARE | Facility: CLINIC | Age: 51
End: 2021-10-04
Payer: COMMERCIAL

## 2021-10-04 ENCOUNTER — INFUSION (OUTPATIENT)
Dept: INFECTIOUS DISEASES | Facility: HOSPITAL | Age: 51
End: 2021-10-04
Attending: INTERNAL MEDICINE
Payer: COMMERCIAL

## 2021-10-04 VITALS
HEART RATE: 86 BPM | RESPIRATION RATE: 18 BRPM | HEIGHT: 65 IN | DIASTOLIC BLOOD PRESSURE: 82 MMHG | SYSTOLIC BLOOD PRESSURE: 147 MMHG | BODY MASS INDEX: 34.99 KG/M2 | TEMPERATURE: 100 F | WEIGHT: 210 LBS | OXYGEN SATURATION: 95 %

## 2021-10-04 VITALS
DIASTOLIC BLOOD PRESSURE: 84 MMHG | HEART RATE: 72 BPM | TEMPERATURE: 99 F | SYSTOLIC BLOOD PRESSURE: 147 MMHG | RESPIRATION RATE: 18 BRPM | OXYGEN SATURATION: 98 %

## 2021-10-04 VITALS
SYSTOLIC BLOOD PRESSURE: 164 MMHG | RESPIRATION RATE: 18 BRPM | TEMPERATURE: 100 F | HEART RATE: 77 BPM | OXYGEN SATURATION: 99 % | DIASTOLIC BLOOD PRESSURE: 105 MMHG

## 2021-10-04 DIAGNOSIS — U07.1 COVID-19 VIRUS DETECTED: ICD-10-CM

## 2021-10-04 DIAGNOSIS — D68.9 BLOOD CLOTTING TENDENCY: ICD-10-CM

## 2021-10-04 DIAGNOSIS — U07.1 COVID-19: Primary | ICD-10-CM

## 2021-10-04 DIAGNOSIS — R11.0 NAUSEA: ICD-10-CM

## 2021-10-04 DIAGNOSIS — R53.83 FATIGUE, UNSPECIFIED TYPE: ICD-10-CM

## 2021-10-04 DIAGNOSIS — R51.9 NONINTRACTABLE HEADACHE, UNSPECIFIED CHRONICITY PATTERN, UNSPECIFIED HEADACHE TYPE: ICD-10-CM

## 2021-10-04 LAB
CTP QC/QA: YES
SARS-COV-2 RDRP RESP QL NAA+PROBE: POSITIVE

## 2021-10-04 PROCEDURE — 1160F PR REVIEW ALL MEDS BY PRESCRIBER/CLIN PHARMACIST DOCUMENTED: ICD-10-PCS | Mod: CPTII,S$GLB,, | Performed by: PHYSICIAN ASSISTANT

## 2021-10-04 PROCEDURE — 1159F PR MEDICATION LIST DOCUMENTED IN MEDICAL RECORD: ICD-10-PCS | Mod: CPTII,S$GLB,, | Performed by: PHYSICIAN ASSISTANT

## 2021-10-04 PROCEDURE — S0119 ONDANSETRON 4 MG: HCPCS | Mod: S$GLB,,, | Performed by: PHYSICIAN ASSISTANT

## 2021-10-04 PROCEDURE — 3008F BODY MASS INDEX DOCD: CPT | Mod: CPTII,S$GLB,, | Performed by: PHYSICIAN ASSISTANT

## 2021-10-04 PROCEDURE — 3077F PR MOST RECENT SYSTOLIC BLOOD PRESSURE >= 140 MM HG: ICD-10-PCS | Mod: CPTII,S$GLB,, | Performed by: PHYSICIAN ASSISTANT

## 2021-10-04 PROCEDURE — 63600175 PHARM REV CODE 636 W HCPCS: Performed by: INTERNAL MEDICINE

## 2021-10-04 PROCEDURE — 1160F RVW MEDS BY RX/DR IN RCRD: CPT | Mod: CPTII,S$GLB,, | Performed by: PHYSICIAN ASSISTANT

## 2021-10-04 PROCEDURE — M0243 CASIRIVI AND IMDEVI INFUSION: HCPCS | Performed by: INTERNAL MEDICINE

## 2021-10-04 PROCEDURE — 1159F MED LIST DOCD IN RCRD: CPT | Mod: CPTII,S$GLB,, | Performed by: PHYSICIAN ASSISTANT

## 2021-10-04 PROCEDURE — 25000003 PHARM REV CODE 250: Performed by: INTERNAL MEDICINE

## 2021-10-04 PROCEDURE — 3079F DIAST BP 80-89 MM HG: CPT | Mod: CPTII,S$GLB,, | Performed by: PHYSICIAN ASSISTANT

## 2021-10-04 PROCEDURE — 96372 PR INJECTION,THERAP/PROPH/DIAG2ST, IM OR SUBCUT: ICD-10-PCS | Mod: S$GLB,,, | Performed by: PHYSICIAN ASSISTANT

## 2021-10-04 PROCEDURE — 99214 PR OFFICE/OUTPT VISIT, EST, LEVL IV, 30-39 MIN: ICD-10-PCS | Mod: 25,S$GLB,, | Performed by: PHYSICIAN ASSISTANT

## 2021-10-04 PROCEDURE — 3079F PR MOST RECENT DIASTOLIC BLOOD PRESSURE 80-89 MM HG: ICD-10-PCS | Mod: CPTII,S$GLB,, | Performed by: PHYSICIAN ASSISTANT

## 2021-10-04 PROCEDURE — S0119 PR ONDANSETRON, ORAL, 4MG: ICD-10-PCS | Mod: S$GLB,,, | Performed by: PHYSICIAN ASSISTANT

## 2021-10-04 PROCEDURE — 99214 OFFICE O/P EST MOD 30 MIN: CPT | Mod: 25,S$GLB,, | Performed by: PHYSICIAN ASSISTANT

## 2021-10-04 PROCEDURE — U0002 COVID-19 LAB TEST NON-CDC: HCPCS | Mod: QW,CR,S$GLB, | Performed by: PHYSICIAN ASSISTANT

## 2021-10-04 PROCEDURE — 3008F PR BODY MASS INDEX (BMI) DOCUMENTED: ICD-10-PCS | Mod: CPTII,S$GLB,, | Performed by: PHYSICIAN ASSISTANT

## 2021-10-04 PROCEDURE — 3077F SYST BP >= 140 MM HG: CPT | Mod: CPTII,S$GLB,, | Performed by: PHYSICIAN ASSISTANT

## 2021-10-04 PROCEDURE — 96372 THER/PROPH/DIAG INJ SC/IM: CPT | Mod: S$GLB,,, | Performed by: PHYSICIAN ASSISTANT

## 2021-10-04 PROCEDURE — U0002: ICD-10-PCS | Mod: QW,CR,S$GLB, | Performed by: PHYSICIAN ASSISTANT

## 2021-10-04 PROCEDURE — 4010F ACE/ARB THERAPY RXD/TAKEN: CPT | Mod: CPTII,S$GLB,, | Performed by: PHYSICIAN ASSISTANT

## 2021-10-04 PROCEDURE — 4010F PR ACE/ARB THEARPY RXD/TAKEN: ICD-10-PCS | Mod: CPTII,S$GLB,, | Performed by: PHYSICIAN ASSISTANT

## 2021-10-04 RX ORDER — ACETAMINOPHEN 325 MG/1
650 TABLET ORAL ONCE AS NEEDED
Status: ACTIVE | OUTPATIENT
Start: 2021-10-04 | End: 2033-03-02

## 2021-10-04 RX ORDER — ONDANSETRON 4 MG/1
4 TABLET, ORALLY DISINTEGRATING ORAL
Status: COMPLETED | OUTPATIENT
Start: 2021-10-04 | End: 2021-10-04

## 2021-10-04 RX ORDER — DIPHENHYDRAMINE HYDROCHLORIDE 50 MG/ML
25 INJECTION INTRAMUSCULAR; INTRAVENOUS ONCE AS NEEDED
Status: DISCONTINUED | OUTPATIENT
Start: 2021-10-04 | End: 2022-05-27 | Stop reason: HOSPADM

## 2021-10-04 RX ORDER — DIPHENHYDRAMINE HYDROCHLORIDE 50 MG/ML
25 INJECTION INTRAMUSCULAR; INTRAVENOUS
Status: COMPLETED | OUTPATIENT
Start: 2021-10-04 | End: 2021-10-04

## 2021-10-04 RX ORDER — ENOXAPARIN SODIUM 100 MG/ML
1.5 INJECTION SUBCUTANEOUS DAILY
Qty: 10 ML | Refills: 0 | Status: SHIPPED | OUTPATIENT
Start: 2021-10-04 | End: 2021-10-11

## 2021-10-04 RX ORDER — EPINEPHRINE 0.3 MG/.3ML
0.3 INJECTION SUBCUTANEOUS
Status: ACTIVE | OUTPATIENT
Start: 2021-10-04

## 2021-10-04 RX ORDER — ENOXAPARIN SODIUM 100 MG/ML
1.5 INJECTION SUBCUTANEOUS DAILY
Qty: 9.8 ML | Refills: 0 | Status: SHIPPED | OUTPATIENT
Start: 2021-10-04 | End: 2021-10-04

## 2021-10-04 RX ORDER — ONDANSETRON 4 MG/1
4 TABLET, ORALLY DISINTEGRATING ORAL 2 TIMES DAILY
Qty: 30 TABLET | Refills: 0 | Status: ON HOLD | OUTPATIENT
Start: 2021-10-04 | End: 2022-05-27 | Stop reason: HOSPADM

## 2021-10-04 RX ORDER — SODIUM CHLORIDE 0.9 % (FLUSH) 0.9 %
10 SYRINGE (ML) INJECTION
Status: ACTIVE | OUTPATIENT
Start: 2021-10-04

## 2021-10-04 RX ORDER — KETOROLAC TROMETHAMINE 30 MG/ML
15 INJECTION, SOLUTION INTRAMUSCULAR; INTRAVENOUS
Status: COMPLETED | OUTPATIENT
Start: 2021-10-04 | End: 2021-10-04

## 2021-10-04 RX ORDER — ONDANSETRON 4 MG/1
4 TABLET, ORALLY DISINTEGRATING ORAL ONCE AS NEEDED
Status: DISCONTINUED | OUTPATIENT
Start: 2021-10-04 | End: 2022-05-27 | Stop reason: HOSPADM

## 2021-10-04 RX ORDER — ALBUTEROL SULFATE 90 UG/1
2 AEROSOL, METERED RESPIRATORY (INHALATION)
Status: ACTIVE | OUTPATIENT
Start: 2021-10-04

## 2021-10-04 RX ADMIN — ONDANSETRON 4 MG: 4 TABLET, ORALLY DISINTEGRATING ORAL at 11:10

## 2021-10-04 RX ADMIN — KETOROLAC TROMETHAMINE 15 MG: 30 INJECTION, SOLUTION INTRAMUSCULAR; INTRAVENOUS at 11:10

## 2021-10-04 RX ADMIN — CASIRIVIMAB AND IMDEVIMAB 600 MG: 600; 600 INJECTION, SOLUTION, CONCENTRATE INTRAVENOUS at 12:10

## 2021-10-04 RX ADMIN — DIPHENHYDRAMINE HYDROCHLORIDE 25 MG: 50 INJECTION INTRAMUSCULAR; INTRAVENOUS at 11:10

## 2021-10-05 ENCOUNTER — HOSPITAL ENCOUNTER (EMERGENCY)
Facility: HOSPITAL | Age: 51
Discharge: HOME | End: 2021-10-05
Attending: EMERGENCY MEDICINE
Payer: COMMERCIAL

## 2021-10-05 ENCOUNTER — TELEPHONE (OUTPATIENT)
Dept: ORTHOPEDICS | Facility: CLINIC | Age: 51
End: 2021-10-05

## 2021-10-05 VITALS
DIASTOLIC BLOOD PRESSURE: 59 MMHG | SYSTOLIC BLOOD PRESSURE: 126 MMHG | WEIGHT: 210 LBS | HEART RATE: 62 BPM | BODY MASS INDEX: 33.75 KG/M2 | OXYGEN SATURATION: 96 % | HEIGHT: 66 IN | TEMPERATURE: 98 F | RESPIRATION RATE: 20 BRPM

## 2021-10-05 DIAGNOSIS — R11.0 NAUSEA: Primary | ICD-10-CM

## 2021-10-05 DIAGNOSIS — G43.809 OTHER MIGRAINE WITHOUT STATUS MIGRAINOSUS, NOT INTRACTABLE: Primary | ICD-10-CM

## 2021-10-05 LAB
ALBUMIN SERPL BCP-MCNC: 3.5 G/DL (ref 3.5–5.2)
ALP SERPL-CCNC: 64 U/L (ref 55–135)
ALT SERPL W/O P-5'-P-CCNC: 28 U/L (ref 10–44)
ANION GAP SERPL CALC-SCNC: 11 MMOL/L (ref 8–16)
AST SERPL-CCNC: 23 U/L (ref 10–40)
BASOPHILS # BLD AUTO: 0.05 K/UL (ref 0–0.2)
BASOPHILS NFR BLD: 0.7 % (ref 0–1.9)
BILIRUB SERPL-MCNC: 0.4 MG/DL (ref 0.1–1)
BUN SERPL-MCNC: 15 MG/DL (ref 6–20)
CALCIUM SERPL-MCNC: 9.5 MG/DL (ref 8.7–10.5)
CHLORIDE SERPL-SCNC: 105 MMOL/L (ref 95–110)
CO2 SERPL-SCNC: 24 MMOL/L (ref 23–29)
CREAT SERPL-MCNC: 0.9 MG/DL (ref 0.5–1.4)
DIFFERENTIAL METHOD: ABNORMAL
EOSINOPHIL # BLD AUTO: 0.4 K/UL (ref 0–0.5)
EOSINOPHIL NFR BLD: 4.6 % (ref 0–8)
ERYTHROCYTE [DISTWIDTH] IN BLOOD BY AUTOMATED COUNT: 12.7 % (ref 11.5–14.5)
EST. GFR  (AFRICAN AMERICAN): >60 ML/MIN/1.73 M^2
EST. GFR  (NON AFRICAN AMERICAN): >60 ML/MIN/1.73 M^2
GLUCOSE SERPL-MCNC: 112 MG/DL (ref 70–110)
HCT VFR BLD AUTO: 41.8 % (ref 37–48.5)
HGB BLD-MCNC: 13.5 G/DL (ref 12–16)
IMM GRANULOCYTES # BLD AUTO: 0.02 K/UL (ref 0–0.04)
IMM GRANULOCYTES NFR BLD AUTO: 0.3 % (ref 0–0.5)
LIPASE SERPL-CCNC: 32 U/L (ref 4–60)
LYMPHOCYTES # BLD AUTO: 1.9 K/UL (ref 1–4.8)
LYMPHOCYTES NFR BLD: 25.2 % (ref 18–48)
MCH RBC QN AUTO: 27.1 PG (ref 27–31)
MCHC RBC AUTO-ENTMCNC: 32.3 G/DL (ref 32–36)
MCV RBC AUTO: 84 FL (ref 82–98)
MONOCYTES # BLD AUTO: 0.7 K/UL (ref 0.3–1)
MONOCYTES NFR BLD: 9.2 % (ref 4–15)
NEUTROPHILS # BLD AUTO: 4.5 K/UL (ref 1.8–7.7)
NEUTROPHILS NFR BLD: 60 % (ref 38–73)
NRBC BLD-RTO: 0 /100 WBC
PLATELET # BLD AUTO: 242 K/UL (ref 150–450)
PMV BLD AUTO: 9.1 FL (ref 9.2–12.9)
POTASSIUM SERPL-SCNC: 4.2 MMOL/L (ref 3.5–5.1)
PROT SERPL-MCNC: 6.6 G/DL (ref 6–8.4)
RBC # BLD AUTO: 4.99 M/UL (ref 4–5.4)
SODIUM SERPL-SCNC: 140 MMOL/L (ref 136–145)
WBC # BLD AUTO: 7.54 K/UL (ref 3.9–12.7)

## 2021-10-05 PROCEDURE — 63600175 PHARM REV CODE 636 W HCPCS: Performed by: EMERGENCY MEDICINE

## 2021-10-05 PROCEDURE — 96375 TX/PRO/DX INJ NEW DRUG ADDON: CPT

## 2021-10-05 PROCEDURE — 99284 EMERGENCY DEPT VISIT MOD MDM: CPT | Mod: 25

## 2021-10-05 PROCEDURE — 83690 ASSAY OF LIPASE: CPT | Performed by: EMERGENCY MEDICINE

## 2021-10-05 PROCEDURE — 96361 HYDRATE IV INFUSION ADD-ON: CPT

## 2021-10-05 PROCEDURE — 80053 COMPREHEN METABOLIC PANEL: CPT | Performed by: EMERGENCY MEDICINE

## 2021-10-05 PROCEDURE — 85025 COMPLETE CBC W/AUTO DIFF WBC: CPT | Performed by: EMERGENCY MEDICINE

## 2021-10-05 PROCEDURE — 96374 THER/PROPH/DIAG INJ IV PUSH: CPT

## 2021-10-05 PROCEDURE — 25000003 PHARM REV CODE 250: Performed by: EMERGENCY MEDICINE

## 2021-10-05 RX ORDER — ONDANSETRON 2 MG/ML
4 INJECTION INTRAMUSCULAR; INTRAVENOUS
Status: COMPLETED | OUTPATIENT
Start: 2021-10-05 | End: 2021-10-05

## 2021-10-05 RX ORDER — KETOROLAC TROMETHAMINE 30 MG/ML
30 INJECTION, SOLUTION INTRAMUSCULAR; INTRAVENOUS
Status: COMPLETED | OUTPATIENT
Start: 2021-10-05 | End: 2021-10-05

## 2021-10-05 RX ORDER — DEXAMETHASONE SODIUM PHOSPHATE 4 MG/ML
6 INJECTION, SOLUTION INTRA-ARTICULAR; INTRALESIONAL; INTRAMUSCULAR; INTRAVENOUS; SOFT TISSUE
Status: COMPLETED | OUTPATIENT
Start: 2021-10-05 | End: 2021-10-05

## 2021-10-05 RX ORDER — KETOROLAC TROMETHAMINE 10 MG/1
10 TABLET, FILM COATED ORAL EVERY 6 HOURS
Qty: 20 TABLET | Refills: 0 | OUTPATIENT
Start: 2021-10-05 | End: 2021-10-06

## 2021-10-05 RX ORDER — HYDROCODONE BITARTRATE AND ACETAMINOPHEN 10; 325 MG/1; MG/1
1 TABLET ORAL EVERY 4 HOURS PRN
Qty: 30 TABLET | Refills: 0 | Status: ON HOLD | OUTPATIENT
Start: 2021-10-05 | End: 2022-05-27 | Stop reason: HOSPADM

## 2021-10-05 RX ORDER — PROMETHAZINE HYDROCHLORIDE 25 MG/1
25 TABLET ORAL EVERY 4 HOURS PRN
Qty: 30 TABLET | Refills: 0 | Status: SHIPPED | OUTPATIENT
Start: 2021-10-05 | End: 2021-10-11

## 2021-10-05 RX ORDER — HYDROMORPHONE HYDROCHLORIDE 2 MG/ML
1 INJECTION, SOLUTION INTRAMUSCULAR; INTRAVENOUS; SUBCUTANEOUS
Status: COMPLETED | OUTPATIENT
Start: 2021-10-05 | End: 2021-10-05

## 2021-10-05 RX ORDER — ONDANSETRON 4 MG/1
4 TABLET, FILM COATED ORAL EVERY 6 HOURS
Qty: 30 TABLET | Refills: 0 | Status: ON HOLD | OUTPATIENT
Start: 2021-10-05 | End: 2022-05-27 | Stop reason: HOSPADM

## 2021-10-05 RX ADMIN — SODIUM CHLORIDE 1000 ML: 0.9 INJECTION, SOLUTION INTRAVENOUS at 06:10

## 2021-10-05 RX ADMIN — KETOROLAC TROMETHAMINE 30 MG: 30 INJECTION, SOLUTION INTRAMUSCULAR at 06:10

## 2021-10-05 RX ADMIN — HYDROMORPHONE HYDROCHLORIDE 1 MG: 2 INJECTION INTRAMUSCULAR; INTRAVENOUS; SUBCUTANEOUS at 06:10

## 2021-10-05 RX ADMIN — DEXAMETHASONE SODIUM PHOSPHATE 6 MG: 4 INJECTION INTRA-ARTICULAR; INTRALESIONAL; INTRAMUSCULAR; INTRAVENOUS; SOFT TISSUE at 07:10

## 2021-10-05 RX ADMIN — ONDANSETRON 4 MG: 2 INJECTION INTRAMUSCULAR; INTRAVENOUS at 06:10

## 2021-10-06 ENCOUNTER — HOSPITAL ENCOUNTER (EMERGENCY)
Facility: HOSPITAL | Age: 51
Discharge: HOME | End: 2021-10-06
Attending: EMERGENCY MEDICINE
Payer: COMMERCIAL

## 2021-10-06 ENCOUNTER — OFFICE VISIT (OUTPATIENT)
Dept: URGENT CARE | Facility: CLINIC | Age: 51
End: 2021-10-06
Payer: COMMERCIAL

## 2021-10-06 VITALS
HEART RATE: 68 BPM | OXYGEN SATURATION: 100 % | DIASTOLIC BLOOD PRESSURE: 74 MMHG | SYSTOLIC BLOOD PRESSURE: 160 MMHG | WEIGHT: 202.25 LBS | HEIGHT: 66 IN | TEMPERATURE: 98 F | RESPIRATION RATE: 18 BRPM | BODY MASS INDEX: 32.5 KG/M2

## 2021-10-06 VITALS
WEIGHT: 210 LBS | DIASTOLIC BLOOD PRESSURE: 75 MMHG | SYSTOLIC BLOOD PRESSURE: 147 MMHG | TEMPERATURE: 100 F | RESPIRATION RATE: 20 BRPM | BODY MASS INDEX: 33.75 KG/M2 | OXYGEN SATURATION: 98 % | HEART RATE: 86 BPM | HEIGHT: 66 IN

## 2021-10-06 DIAGNOSIS — Z86.16 HISTORY OF COVID-19: ICD-10-CM

## 2021-10-06 DIAGNOSIS — R51.9 FRONTAL HEADACHE: Primary | ICD-10-CM

## 2021-10-06 DIAGNOSIS — R10.9 ABDOMINAL PAIN: ICD-10-CM

## 2021-10-06 LAB
ALBUMIN SERPL BCP-MCNC: 3.7 G/DL (ref 3.5–5.2)
ALP SERPL-CCNC: 67 U/L (ref 55–135)
ALT SERPL W/O P-5'-P-CCNC: 24 U/L (ref 10–44)
ANION GAP SERPL CALC-SCNC: 11 MMOL/L (ref 8–16)
AST SERPL-CCNC: 17 U/L (ref 10–40)
BASOPHILS # BLD AUTO: 0.04 K/UL (ref 0–0.2)
BASOPHILS NFR BLD: 0.5 % (ref 0–1.9)
BILIRUB SERPL-MCNC: 0.5 MG/DL (ref 0.1–1)
BILIRUB UR QL STRIP: NEGATIVE
BUN SERPL-MCNC: 12 MG/DL (ref 6–20)
CALCIUM SERPL-MCNC: 9.7 MG/DL (ref 8.7–10.5)
CHLORIDE SERPL-SCNC: 102 MMOL/L (ref 95–110)
CLARITY UR: CLEAR
CO2 SERPL-SCNC: 26 MMOL/L (ref 23–29)
COLOR UR: YELLOW
CREAT SERPL-MCNC: 1 MG/DL (ref 0.5–1.4)
DIFFERENTIAL METHOD: ABNORMAL
EOSINOPHIL # BLD AUTO: 0.2 K/UL (ref 0–0.5)
EOSINOPHIL NFR BLD: 2.5 % (ref 0–8)
ERYTHROCYTE [DISTWIDTH] IN BLOOD BY AUTOMATED COUNT: 12.9 % (ref 11.5–14.5)
EST. GFR  (AFRICAN AMERICAN): >60 ML/MIN/1.73 M^2
EST. GFR  (NON AFRICAN AMERICAN): >60 ML/MIN/1.73 M^2
GLUCOSE SERPL-MCNC: 114 MG/DL (ref 70–110)
GLUCOSE UR QL STRIP: NEGATIVE
HCT VFR BLD AUTO: 41.6 % (ref 37–48.5)
HGB BLD-MCNC: 13.8 G/DL (ref 12–16)
HGB UR QL STRIP: NEGATIVE
IMM GRANULOCYTES # BLD AUTO: 0.02 K/UL (ref 0–0.04)
IMM GRANULOCYTES NFR BLD AUTO: 0.3 % (ref 0–0.5)
KETONES UR QL STRIP: NEGATIVE
LEUKOCYTE ESTERASE UR QL STRIP: NEGATIVE
LIPASE SERPL-CCNC: 27 U/L (ref 4–60)
LYMPHOCYTES # BLD AUTO: 2.1 K/UL (ref 1–4.8)
LYMPHOCYTES NFR BLD: 27.9 % (ref 18–48)
MCH RBC QN AUTO: 27.4 PG (ref 27–31)
MCHC RBC AUTO-ENTMCNC: 33.2 G/DL (ref 32–36)
MCV RBC AUTO: 83 FL (ref 82–98)
MONOCYTES # BLD AUTO: 0.6 K/UL (ref 0.3–1)
MONOCYTES NFR BLD: 8.4 % (ref 4–15)
NEUTROPHILS # BLD AUTO: 4.5 K/UL (ref 1.8–7.7)
NEUTROPHILS NFR BLD: 60.4 % (ref 38–73)
NITRITE UR QL STRIP: NEGATIVE
NRBC BLD-RTO: 0 /100 WBC
PH UR STRIP: 8 [PH] (ref 5–8)
PLATELET # BLD AUTO: 257 K/UL (ref 150–450)
PMV BLD AUTO: 8.7 FL (ref 9.2–12.9)
POTASSIUM SERPL-SCNC: 3.8 MMOL/L (ref 3.5–5.1)
PROT SERPL-MCNC: 6.8 G/DL (ref 6–8.4)
PROT UR QL STRIP: NEGATIVE
RBC # BLD AUTO: 5.03 M/UL (ref 4–5.4)
SODIUM SERPL-SCNC: 139 MMOL/L (ref 136–145)
SP GR UR STRIP: 1.01 (ref 1–1.03)
URN SPEC COLLECT METH UR: NORMAL
UROBILINOGEN UR STRIP-ACNC: NEGATIVE EU/DL
WBC # BLD AUTO: 7.5 K/UL (ref 3.9–12.7)

## 2021-10-06 PROCEDURE — 85025 COMPLETE CBC W/AUTO DIFF WBC: CPT | Performed by: NURSE PRACTITIONER

## 2021-10-06 PROCEDURE — 96374 THER/PROPH/DIAG INJ IV PUSH: CPT

## 2021-10-06 PROCEDURE — 80053 COMPREHEN METABOLIC PANEL: CPT | Performed by: NURSE PRACTITIONER

## 2021-10-06 PROCEDURE — 96361 HYDRATE IV INFUSION ADD-ON: CPT

## 2021-10-06 PROCEDURE — 63600175 PHARM REV CODE 636 W HCPCS: Performed by: EMERGENCY MEDICINE

## 2021-10-06 PROCEDURE — 99285 EMERGENCY DEPT VISIT HI MDM: CPT | Mod: 25

## 2021-10-06 PROCEDURE — 83690 ASSAY OF LIPASE: CPT | Performed by: NURSE PRACTITIONER

## 2021-10-06 PROCEDURE — 81003 URINALYSIS AUTO W/O SCOPE: CPT | Performed by: NURSE PRACTITIONER

## 2021-10-06 PROCEDURE — 25000003 PHARM REV CODE 250: Performed by: EMERGENCY MEDICINE

## 2021-10-06 PROCEDURE — 63600175 PHARM REV CODE 636 W HCPCS: Performed by: NURSE PRACTITIONER

## 2021-10-06 RX ORDER — KETOROLAC TROMETHAMINE 10 MG/1
10 TABLET, FILM COATED ORAL EVERY 6 HOURS
Qty: 20 TABLET | Refills: 0 | Status: SHIPPED | OUTPATIENT
Start: 2021-10-06 | End: 2021-10-11

## 2021-10-06 RX ORDER — BUTALBITAL, ACETAMINOPHEN AND CAFFEINE 50; 325; 40 MG/1; MG/1; MG/1
1 TABLET ORAL
Status: COMPLETED | OUTPATIENT
Start: 2021-10-06 | End: 2021-10-06

## 2021-10-06 RX ORDER — BUTALBITAL, ACETAMINOPHEN AND CAFFEINE 50; 325; 40 MG/1; MG/1; MG/1
1 TABLET ORAL EVERY 4 HOURS PRN
Qty: 20 TABLET | Refills: 0 | Status: SHIPPED | OUTPATIENT
Start: 2021-10-06 | End: 2021-10-11 | Stop reason: SDUPTHER

## 2021-10-06 RX ORDER — ONDANSETRON 2 MG/ML
4 INJECTION INTRAMUSCULAR; INTRAVENOUS
Status: COMPLETED | OUTPATIENT
Start: 2021-10-06 | End: 2021-10-06

## 2021-10-06 RX ORDER — PROCHLORPERAZINE MALEATE 5 MG
10 TABLET ORAL
Status: COMPLETED | OUTPATIENT
Start: 2021-10-06 | End: 2021-10-06

## 2021-10-06 RX ADMIN — SODIUM CHLORIDE 1000 ML: 0.9 INJECTION, SOLUTION INTRAVENOUS at 04:10

## 2021-10-06 RX ADMIN — BUTALBITAL, ACETAMINOPHEN AND CAFFEINE 1 TABLET: 50; 325; 40 TABLET ORAL at 04:10

## 2021-10-06 RX ADMIN — PROCHLORPERAZINE MALEATE 10 MG: 5 TABLET ORAL at 04:10

## 2021-10-06 RX ADMIN — ONDANSETRON 4 MG: 2 INJECTION INTRAMUSCULAR; INTRAVENOUS at 04:10

## 2021-10-09 ENCOUNTER — TELEPHONE (OUTPATIENT)
Dept: URGENT CARE | Facility: CLINIC | Age: 51
End: 2021-10-09

## 2021-10-09 DIAGNOSIS — R51.9 NONINTRACTABLE HEADACHE, UNSPECIFIED CHRONICITY PATTERN, UNSPECIFIED HEADACHE TYPE: Primary | ICD-10-CM

## 2021-10-09 RX ORDER — KETOROLAC TROMETHAMINE 10 MG/1
10 TABLET, FILM COATED ORAL EVERY 6 HOURS
Qty: 20 TABLET | Refills: 0 | Status: SHIPPED | OUTPATIENT
Start: 2021-10-09 | End: 2021-10-14

## 2021-10-11 ENCOUNTER — HOSPITAL ENCOUNTER (EMERGENCY)
Facility: HOSPITAL | Age: 51
Discharge: HOME | End: 2021-10-11
Attending: EMERGENCY MEDICINE
Payer: COMMERCIAL

## 2021-10-11 VITALS
BODY MASS INDEX: 32.74 KG/M2 | TEMPERATURE: 98 F | OXYGEN SATURATION: 99 % | SYSTOLIC BLOOD PRESSURE: 141 MMHG | DIASTOLIC BLOOD PRESSURE: 79 MMHG | WEIGHT: 202.81 LBS | RESPIRATION RATE: 18 BRPM | HEART RATE: 58 BPM

## 2021-10-11 DIAGNOSIS — J40 BRONCHITIS: ICD-10-CM

## 2021-10-11 DIAGNOSIS — K12.0 APHTHOUS ULCER OF MOUTH: ICD-10-CM

## 2021-10-11 DIAGNOSIS — R06.00 DYSPNEA: ICD-10-CM

## 2021-10-11 DIAGNOSIS — G43.801 OTHER MIGRAINE WITH STATUS MIGRAINOSUS, NOT INTRACTABLE: ICD-10-CM

## 2021-10-11 DIAGNOSIS — S09.90XA CLOSED HEAD INJURY, INITIAL ENCOUNTER: Primary | ICD-10-CM

## 2021-10-11 LAB
ALBUMIN SERPL BCP-MCNC: 3.6 G/DL (ref 3.5–5.2)
ALP SERPL-CCNC: 82 U/L (ref 55–135)
ALT SERPL W/O P-5'-P-CCNC: 79 U/L (ref 10–44)
ANION GAP SERPL CALC-SCNC: 11 MMOL/L (ref 8–16)
AST SERPL-CCNC: 37 U/L (ref 10–40)
BASOPHILS # BLD AUTO: 0.04 K/UL (ref 0–0.2)
BASOPHILS NFR BLD: 0.5 % (ref 0–1.9)
BILIRUB SERPL-MCNC: 0.3 MG/DL (ref 0.1–1)
BUN SERPL-MCNC: 8 MG/DL (ref 6–20)
CALCIUM SERPL-MCNC: 9.7 MG/DL (ref 8.7–10.5)
CHLORIDE SERPL-SCNC: 100 MMOL/L (ref 95–110)
CO2 SERPL-SCNC: 27 MMOL/L (ref 23–29)
CREAT SERPL-MCNC: 0.8 MG/DL (ref 0.5–1.4)
DIFFERENTIAL METHOD: ABNORMAL
EOSINOPHIL # BLD AUTO: 0.5 K/UL (ref 0–0.5)
EOSINOPHIL NFR BLD: 6.9 % (ref 0–8)
ERYTHROCYTE [DISTWIDTH] IN BLOOD BY AUTOMATED COUNT: 13.3 % (ref 11.5–14.5)
EST. GFR  (AFRICAN AMERICAN): >60 ML/MIN/1.73 M^2
EST. GFR  (NON AFRICAN AMERICAN): >60 ML/MIN/1.73 M^2
GLUCOSE SERPL-MCNC: 111 MG/DL (ref 70–110)
HCT VFR BLD AUTO: 44.2 % (ref 37–48.5)
HGB BLD-MCNC: 14.3 G/DL (ref 12–16)
IMM GRANULOCYTES # BLD AUTO: 0.05 K/UL (ref 0–0.04)
IMM GRANULOCYTES NFR BLD AUTO: 0.7 % (ref 0–0.5)
LYMPHOCYTES # BLD AUTO: 2.5 K/UL (ref 1–4.8)
LYMPHOCYTES NFR BLD: 34.2 % (ref 18–48)
MCH RBC QN AUTO: 27.1 PG (ref 27–31)
MCHC RBC AUTO-ENTMCNC: 32.4 G/DL (ref 32–36)
MCV RBC AUTO: 84 FL (ref 82–98)
MONOCYTES # BLD AUTO: 0.6 K/UL (ref 0.3–1)
MONOCYTES NFR BLD: 8.4 % (ref 4–15)
NEUTROPHILS # BLD AUTO: 3.6 K/UL (ref 1.8–7.7)
NEUTROPHILS NFR BLD: 49.3 % (ref 38–73)
NRBC BLD-RTO: 0 /100 WBC
PLATELET # BLD AUTO: 262 K/UL (ref 150–450)
PMV BLD AUTO: 9 FL (ref 9.2–12.9)
POTASSIUM SERPL-SCNC: 3.8 MMOL/L (ref 3.5–5.1)
PROT SERPL-MCNC: 6.9 G/DL (ref 6–8.4)
RBC # BLD AUTO: 5.28 M/UL (ref 4–5.4)
SODIUM SERPL-SCNC: 138 MMOL/L (ref 136–145)
WBC # BLD AUTO: 7.29 K/UL (ref 3.9–12.7)

## 2021-10-11 PROCEDURE — 80053 COMPREHEN METABOLIC PANEL: CPT | Performed by: EMERGENCY MEDICINE

## 2021-10-11 PROCEDURE — 63600175 PHARM REV CODE 636 W HCPCS: Performed by: EMERGENCY MEDICINE

## 2021-10-11 PROCEDURE — 25000003 PHARM REV CODE 250: Performed by: EMERGENCY MEDICINE

## 2021-10-11 PROCEDURE — 96361 HYDRATE IV INFUSION ADD-ON: CPT

## 2021-10-11 PROCEDURE — 96374 THER/PROPH/DIAG INJ IV PUSH: CPT

## 2021-10-11 PROCEDURE — 96375 TX/PRO/DX INJ NEW DRUG ADDON: CPT

## 2021-10-11 PROCEDURE — 99285 EMERGENCY DEPT VISIT HI MDM: CPT | Mod: 25

## 2021-10-11 PROCEDURE — 85025 COMPLETE CBC W/AUTO DIFF WBC: CPT | Performed by: EMERGENCY MEDICINE

## 2021-10-11 RX ORDER — DOXYCYCLINE 100 MG/1
100 CAPSULE ORAL 2 TIMES DAILY
Qty: 20 CAPSULE | Refills: 0 | Status: SHIPPED | OUTPATIENT
Start: 2021-10-11 | End: 2021-10-18

## 2021-10-11 RX ORDER — KETOROLAC TROMETHAMINE 30 MG/ML
15 INJECTION, SOLUTION INTRAMUSCULAR; INTRAVENOUS
Status: COMPLETED | OUTPATIENT
Start: 2021-10-11 | End: 2021-10-11

## 2021-10-11 RX ORDER — ONDANSETRON 2 MG/ML
4 INJECTION INTRAMUSCULAR; INTRAVENOUS
Status: COMPLETED | OUTPATIENT
Start: 2021-10-11 | End: 2021-10-11

## 2021-10-11 RX ORDER — BUTALBITAL, ACETAMINOPHEN AND CAFFEINE 50; 325; 40 MG/1; MG/1; MG/1
1 TABLET ORAL EVERY 6 HOURS PRN
Qty: 20 TABLET | Refills: 0 | Status: SHIPPED | OUTPATIENT
Start: 2021-10-11 | End: 2021-11-10

## 2021-10-11 RX ORDER — ACETAMINOPHEN 500 MG
1000 TABLET ORAL
Status: COMPLETED | OUTPATIENT
Start: 2021-10-11 | End: 2021-10-11

## 2021-10-11 RX ADMIN — ACETAMINOPHEN 1000 MG: 500 TABLET ORAL at 12:10

## 2021-10-11 RX ADMIN — KETOROLAC TROMETHAMINE 15 MG: 30 INJECTION, SOLUTION INTRAMUSCULAR at 12:10

## 2021-10-11 RX ADMIN — SODIUM CHLORIDE 1000 ML: 0.9 INJECTION, SOLUTION INTRAVENOUS at 12:10

## 2021-10-11 RX ADMIN — ONDANSETRON 4 MG: 2 INJECTION INTRAMUSCULAR; INTRAVENOUS at 12:10

## 2021-11-03 ENCOUNTER — OFFICE VISIT (OUTPATIENT)
Dept: CARDIOLOGY | Facility: CLINIC | Age: 51
End: 2021-11-03
Payer: COMMERCIAL

## 2021-11-03 ENCOUNTER — LAB VISIT (OUTPATIENT)
Dept: LAB | Facility: HOSPITAL | Age: 51
End: 2021-11-03
Attending: INTERNAL MEDICINE
Payer: COMMERCIAL

## 2021-11-03 ENCOUNTER — HOSPITAL ENCOUNTER (OUTPATIENT)
Dept: CARDIOLOGY | Facility: HOSPITAL | Age: 51
Discharge: HOME | End: 2021-11-03
Attending: INTERNAL MEDICINE
Payer: COMMERCIAL

## 2021-11-03 VITALS
DIASTOLIC BLOOD PRESSURE: 78 MMHG | HEART RATE: 96 BPM | HEIGHT: 66 IN | OXYGEN SATURATION: 97 % | WEIGHT: 205 LBS | BODY MASS INDEX: 32.95 KG/M2 | SYSTOLIC BLOOD PRESSURE: 120 MMHG

## 2021-11-03 DIAGNOSIS — R06.02 SHORTNESS OF BREATH: ICD-10-CM

## 2021-11-03 DIAGNOSIS — T81.718D IATROGENIC PULMONARY EMBOLISM AND INFARCTION, SUBSEQUENT ENCOUNTER: ICD-10-CM

## 2021-11-03 DIAGNOSIS — R00.0 TACHYCARDIA: ICD-10-CM

## 2021-11-03 DIAGNOSIS — R00.2 PALPITATION: Primary | ICD-10-CM

## 2021-11-03 DIAGNOSIS — Z82.49 FAMILY HISTORY OF PREMATURE CAD: ICD-10-CM

## 2021-11-03 DIAGNOSIS — R00.2 PALPITATION: ICD-10-CM

## 2021-11-03 DIAGNOSIS — U07.1 COVID-19: ICD-10-CM

## 2021-11-03 DIAGNOSIS — R93.1 HIGH CORONARY ARTERY CALCIUM SCORE: ICD-10-CM

## 2021-11-03 DIAGNOSIS — I26.99 IATROGENIC PULMONARY EMBOLISM AND INFARCTION, SUBSEQUENT ENCOUNTER: ICD-10-CM

## 2021-11-03 DIAGNOSIS — D68.62 LUPUS ANTICOAGULANT DISORDER: ICD-10-CM

## 2021-11-03 DIAGNOSIS — Z79.01 LONG TERM (CURRENT) USE OF ANTICOAGULANTS: ICD-10-CM

## 2021-11-03 PROCEDURE — 1159F MED LIST DOCD IN RCRD: CPT | Mod: CPTII,S$GLB,, | Performed by: INTERNAL MEDICINE

## 2021-11-03 PROCEDURE — 4010F PR ACE/ARB THEARPY RXD/TAKEN: ICD-10-PCS | Mod: CPTII,S$GLB,, | Performed by: INTERNAL MEDICINE

## 2021-11-03 PROCEDURE — 99214 PR OFFICE/OUTPT VISIT, EST, LEVL IV, 30-39 MIN: ICD-10-PCS | Mod: 25,S$GLB,, | Performed by: INTERNAL MEDICINE

## 2021-11-03 PROCEDURE — 3074F PR MOST RECENT SYSTOLIC BLOOD PRESSURE < 130 MM HG: ICD-10-PCS | Mod: CPTII,S$GLB,, | Performed by: INTERNAL MEDICINE

## 2021-11-03 PROCEDURE — 99999 PR PBB SHADOW E&M-EST. PATIENT-LVL V: CPT | Mod: PBBFAC,,, | Performed by: INTERNAL MEDICINE

## 2021-11-03 PROCEDURE — 3074F SYST BP LT 130 MM HG: CPT | Mod: CPTII,S$GLB,, | Performed by: INTERNAL MEDICINE

## 2021-11-03 PROCEDURE — 4010F ACE/ARB THERAPY RXD/TAKEN: CPT | Mod: CPTII,S$GLB,, | Performed by: INTERNAL MEDICINE

## 2021-11-03 PROCEDURE — 99214 OFFICE O/P EST MOD 30 MIN: CPT | Mod: 25,S$GLB,, | Performed by: INTERNAL MEDICINE

## 2021-11-03 PROCEDURE — 93005 ELECTROCARDIOGRAM TRACING: CPT

## 2021-11-03 PROCEDURE — 84439 ASSAY OF FREE THYROXINE: CPT | Performed by: INTERNAL MEDICINE

## 2021-11-03 PROCEDURE — 84443 ASSAY THYROID STIM HORMONE: CPT | Performed by: INTERNAL MEDICINE

## 2021-11-03 PROCEDURE — 3008F PR BODY MASS INDEX (BMI) DOCUMENTED: ICD-10-PCS | Mod: CPTII,S$GLB,, | Performed by: INTERNAL MEDICINE

## 2021-11-03 PROCEDURE — 93010 ELECTROCARDIOGRAM REPORT: CPT | Mod: ,,, | Performed by: INTERNAL MEDICINE

## 2021-11-03 PROCEDURE — 1160F PR REVIEW ALL MEDS BY PRESCRIBER/CLIN PHARMACIST DOCUMENTED: ICD-10-PCS | Mod: CPTII,S$GLB,, | Performed by: INTERNAL MEDICINE

## 2021-11-03 PROCEDURE — 36415 COLL VENOUS BLD VENIPUNCTURE: CPT | Performed by: INTERNAL MEDICINE

## 2021-11-03 PROCEDURE — 3078F PR MOST RECENT DIASTOLIC BLOOD PRESSURE < 80 MM HG: ICD-10-PCS | Mod: CPTII,S$GLB,, | Performed by: INTERNAL MEDICINE

## 2021-11-03 PROCEDURE — 99999 PR PBB SHADOW E&M-EST. PATIENT-LVL V: ICD-10-PCS | Mod: PBBFAC,,, | Performed by: INTERNAL MEDICINE

## 2021-11-03 PROCEDURE — 3008F BODY MASS INDEX DOCD: CPT | Mod: CPTII,S$GLB,, | Performed by: INTERNAL MEDICINE

## 2021-11-03 PROCEDURE — 1159F PR MEDICATION LIST DOCUMENTED IN MEDICAL RECORD: ICD-10-PCS | Mod: CPTII,S$GLB,, | Performed by: INTERNAL MEDICINE

## 2021-11-03 PROCEDURE — 3078F DIAST BP <80 MM HG: CPT | Mod: CPTII,S$GLB,, | Performed by: INTERNAL MEDICINE

## 2021-11-03 PROCEDURE — 93010 EKG 12-LEAD: ICD-10-PCS | Mod: ,,, | Performed by: INTERNAL MEDICINE

## 2021-11-03 PROCEDURE — 1160F RVW MEDS BY RX/DR IN RCRD: CPT | Mod: CPTII,S$GLB,, | Performed by: INTERNAL MEDICINE

## 2021-11-03 RX ORDER — METOPROLOL SUCCINATE 25 MG/1
25 TABLET, EXTENDED RELEASE ORAL DAILY
Qty: 30 TABLET | Refills: 11 | Status: SHIPPED | OUTPATIENT
Start: 2021-11-03 | End: 2022-10-18

## 2021-11-04 LAB
T4 FREE SERPL-MCNC: 0.75 NG/DL (ref 0.71–1.51)
TSH SERPL DL<=0.005 MIU/L-ACNC: 0.06 UIU/ML (ref 0.4–4)

## 2021-11-05 ENCOUNTER — TELEPHONE (OUTPATIENT)
Dept: CARDIOLOGY | Facility: CLINIC | Age: 51
End: 2021-11-05
Payer: COMMERCIAL

## 2021-11-05 ENCOUNTER — PATIENT MESSAGE (OUTPATIENT)
Dept: CARDIOLOGY | Facility: CLINIC | Age: 51
End: 2021-11-05
Payer: COMMERCIAL

## 2021-11-12 ENCOUNTER — CLINICAL SUPPORT (OUTPATIENT)
Dept: PULMONOLOGY | Facility: CLINIC | Age: 51
End: 2021-11-12
Payer: COMMERCIAL

## 2021-11-12 DIAGNOSIS — R06.02 SHORTNESS OF BREATH: ICD-10-CM

## 2021-11-12 DIAGNOSIS — R00.2 PALPITATION: ICD-10-CM

## 2021-11-12 DIAGNOSIS — U07.1 COVID-19: ICD-10-CM

## 2021-11-12 PROCEDURE — 94726 PULM FUNCT TST PLETHYSMOGRAP: ICD-10-PCS | Mod: S$GLB,,, | Performed by: INTERNAL MEDICINE

## 2021-11-12 PROCEDURE — 94060 EVALUATION OF WHEEZING: CPT | Mod: S$GLB,,, | Performed by: INTERNAL MEDICINE

## 2021-11-12 PROCEDURE — 94060 PR EVAL OF BRONCHOSPASM: ICD-10-PCS | Mod: S$GLB,,, | Performed by: INTERNAL MEDICINE

## 2021-11-12 PROCEDURE — 94726 PLETHYSMOGRAPHY LUNG VOLUMES: CPT | Mod: S$GLB,,, | Performed by: INTERNAL MEDICINE

## 2021-11-12 PROCEDURE — 94729 DIFFUSING CAPACITY: CPT | Mod: S$GLB,,, | Performed by: INTERNAL MEDICINE

## 2021-11-12 PROCEDURE — 94729 PR C02/MEMBANE DIFFUSE CAPACITY: ICD-10-PCS | Mod: S$GLB,,, | Performed by: INTERNAL MEDICINE

## 2021-11-15 ENCOUNTER — PATIENT MESSAGE (OUTPATIENT)
Dept: CARDIOLOGY | Facility: CLINIC | Age: 51
End: 2021-11-15

## 2021-11-15 ENCOUNTER — HOSPITAL ENCOUNTER (OUTPATIENT)
Dept: CARDIOLOGY | Facility: HOSPITAL | Age: 51
Discharge: HOME | End: 2021-11-15
Attending: INTERNAL MEDICINE
Payer: COMMERCIAL

## 2021-11-15 ENCOUNTER — TELEPHONE (OUTPATIENT)
Dept: CARDIOLOGY | Facility: HOSPITAL | Age: 51
End: 2021-11-15
Payer: COMMERCIAL

## 2021-11-15 VITALS
HEART RATE: 80 BPM | SYSTOLIC BLOOD PRESSURE: 120 MMHG | HEIGHT: 66 IN | BODY MASS INDEX: 32.95 KG/M2 | WEIGHT: 205 LBS | DIASTOLIC BLOOD PRESSURE: 78 MMHG

## 2021-11-15 DIAGNOSIS — R06.02 SHORTNESS OF BREATH: ICD-10-CM

## 2021-11-15 DIAGNOSIS — R00.2 PALPITATION: ICD-10-CM

## 2021-11-15 DIAGNOSIS — R06.02 SOB (SHORTNESS OF BREATH): Primary | ICD-10-CM

## 2021-11-15 DIAGNOSIS — U07.1 COVID-19: ICD-10-CM

## 2021-11-15 LAB
AORTIC ROOT ANNULUS: 2.3 CM
ASCENDING AORTA: 2.32 CM
AV INDEX (PROSTH): 0.71
AV MEAN GRADIENT: 7 MMHG
AV PEAK GRADIENT: 15 MMHG
AV VALVE AREA: 2.07 CM2
AV VELOCITY RATIO: 0.61
BRPFT: NORMAL
BSA FOR ECHO PROCEDURE: 2.08 M2
CV ECHO LV RWT: 0.53 CM
DLCO ADJ PRE: 16.89 ML/(MIN*MMHG)
DLCO SINGLE BREATH LLN: 19.95
DLCO SINGLE BREATH PRE REF: 65.8 %
DLCO SINGLE BREATH REF: 25.68
DLCOC SBVA LLN: 3.44
DLCOC SBVA PRE REF: 98.9 %
DLCOC SBVA REF: 4.85
DLCOC SINGLE BREATH LLN: 19.95
DLCOC SINGLE BREATH PRE REF: 65.8 %
DLCOC SINGLE BREATH REF: 25.68
DLCOVA LLN: 3.44
DLCOVA PRE REF: 98.9 %
DLCOVA PRE: 4.79 ML/(MIN*MMHG*L)
DLCOVA REF: 4.85
DLVAADJ PRE: 4.79 ML/(MIN*MMHG*L)
DOP CALC AO PEAK VEL: 1.94 M/S
DOP CALC AO VTI: 39.2 CM
DOP CALC LVOT AREA: 2.9 CM2
DOP CALC LVOT DIAMETER: 1.93 CM
DOP CALC LVOT PEAK VEL: 1.19 M/S
DOP CALC LVOT STROKE VOLUME: 81 CM3
DOP CALC RVOT PEAK VEL: 0.83 M/S
DOP CALC RVOT VTI: 17.3 CM
DOP CALCLVOT PEAK VEL VTI: 27.7 CM
E WAVE DECELERATION TIME: 212.38 MSEC
E/A RATIO: 1.28
E/E' RATIO: 8.14 M/S
ECHO EF ESTIMATED: 62 %
ECHO LV POSTERIOR WALL: 1.09 CM (ref 0.6–1.1)
EJECTION FRACTION: 60 %
ERV LLN: -16449.01
ERV PRE REF: 71.9 %
ERV REF: 0.99
FEF 25 75 CHG: 19.1 %
FEF 25 75 LLN: 1.65
FEF 25 75 POST REF: 142.2 %
FEF 25 75 PRE REF: 119.4 %
FEF 25 75 REF: 2.91
FET100 CHG: -13.5 %
FEV1 CHG: 3.1 %
FEV1 FVC CHG: 4.7 %
FEV1 FVC LLN: 69
FEV1 FVC POST REF: 109.9 %
FEV1 FVC PRE REF: 104.9 %
FEV1 FVC REF: 80
FEV1 LLN: 2.34
FEV1 POST REF: 85.4 %
FEV1 PRE REF: 82.9 %
FEV1 REF: 2.99
FRACTIONAL SHORTENING: 33 % (ref 28–44)
FRCPLETH LLN: 1.99
FRCPLETH PREREF: 89.9 %
FRCPLETH REF: 2.81
FVC CHG: -1.6 %
FVC LLN: 2.94
FVC POST REF: 77.2 %
FVC PRE REF: 78.5 %
FVC REF: 3.75
INTERVENTRICULAR SEPTUM: 0.91 CM (ref 0.6–1.1)
IVC PRE: 2.51 L
IVC SINGLE BREATH LLN: 2.94
IVC SINGLE BREATH PRE REF: 67 %
IVC SINGLE BREATH REF: 3.75
IVRT: 77.07 MSEC
LA MAJOR: 4.54 CM
LA MINOR: 4.44 CM
LA WIDTH: 3.23 CM
LEFT ATRIUM SIZE: 3.13 CM
LEFT ATRIUM VOLUME INDEX MOD: 11.6 ML/M2
LEFT ATRIUM VOLUME INDEX: 19.1 ML/M2
LEFT ATRIUM VOLUME MOD: 23.49 CM3
LEFT ATRIUM VOLUME: 38.58 CM3
LEFT INTERNAL DIMENSION IN SYSTOLE: 2.73 CM (ref 2.1–4)
LEFT VENTRICLE DIASTOLIC VOLUME INDEX: 36.33 ML/M2
LEFT VENTRICLE DIASTOLIC VOLUME: 73.38 ML
LEFT VENTRICLE MASS INDEX: 65 G/M2
LEFT VENTRICLE SYSTOLIC VOLUME INDEX: 13.7 ML/M2
LEFT VENTRICLE SYSTOLIC VOLUME: 27.72 ML
LEFT VENTRICULAR INTERNAL DIMENSION IN DIASTOLE: 4.08 CM (ref 3.5–6)
LEFT VENTRICULAR MASS: 131.09 G
LV LATERAL E/E' RATIO: 8.14 M/S
LV SEPTAL E/E' RATIO: 8.14 M/S
LVOT MG: 3.35 MMHG
LVOT MV: 0.87 CM/S
MV PEAK A VEL: 0.89 M/S
MV PEAK E VEL: 1.14 M/S
MV STENOSIS PRESSURE HALF TIME: 61.59 MS
MV VALVE AREA P 1/2 METHOD: 3.57 CM2
MVV LLN: 96
MVV PRE REF: 112.9 %
MVV REF: 112
PEF CHG: -6.3 %
PEF LLN: 5.28
PEF POST REF: 104.5 %
PEF PRE REF: 111.5 %
PEF REF: 7.1
PISA TR MAX VEL: 2.14 M/S
POST FEF 25 75: 4.13 L/S
POST FET 100: 6.57 SEC
POST FEV1 FVC: 88.35 %
POST FEV1: 2.56 L
POST FVC: 2.89 L
POST PEF: 7.42 L/S
PRE DLCO: 16.89 ML/(MIN*MMHG)
PRE ERV: 0.71 L
PRE FEF 25 75: 3.47 L/S
PRE FET 100: 7.59 SEC
PRE FEV1 FVC: 84.35 %
PRE FEV1: 2.48 L
PRE FRC PL: 2.53 L
PRE FVC: 2.94 L
PRE MVV: 127 L/MIN
PRE PEF: 7.92 L/S
PRE RV: 1.44 L
PRE TLC: 4.38 L
PULM VEIN S/D RATIO: 0.82
PV MEAN GRADIENT: 1.32 MMHG
PV PEAK D VEL: 0.68 M/S
PV PEAK S VEL: 0.56 M/S
PV PEAK VELOCITY: 1.14 CM/S
RA MAJOR: 4.25 CM
RA PRESSURE: 3 MMHG
RA WIDTH: 2.96 CM
RAW LLN: 3.06
RAW PRE REF: 74.7 %
RAW PRE: 2.29 CMH2O*S/L
RAW REF: 3.06
RIGHT VENTRICULAR END-DIASTOLIC DIMENSION: 3.11 CM
RV LLN: 1.25
RV PRE REF: 79.1 %
RV REF: 1.82
RVTLC LLN: 26
RVTLC PRE REF: 92.4 %
RVTLC PRE: 32.92 %
RVTLC REF: 36
SINUS: 2.24 CM
STJ: 2.15 CM
TDI LATERAL: 0.14 M/S
TDI SEPTAL: 0.14 M/S
TDI: 0.14 M/S
TLC LLN: 4.31
TLC PRE REF: 82.7 %
TLC REF: 5.3
TR MAX PG: 18 MMHG
TV REST PULMONARY ARTERY PRESSURE: 21 MMHG
VA PRE: 3.52 L
VA SINGLE BREATH LLN: 5.15
VA SINGLE BREATH PRE REF: 68.4 %
VA SINGLE BREATH REF: 5.15
VC LLN: 2.94
VC PRE REF: 78.5 %
VC PRE: 2.94 L
VC REF: 3.75
VTGRAWPRE: 2.56 L

## 2021-11-15 PROCEDURE — 93306 ECHO (CUPID ONLY): ICD-10-PCS | Mod: 26,,, | Performed by: INTERNAL MEDICINE

## 2021-11-15 PROCEDURE — 93227 XTRNL ECG REC<48 HR R&I: CPT | Mod: ,,, | Performed by: INTERNAL MEDICINE

## 2021-11-15 PROCEDURE — 93306 TTE W/DOPPLER COMPLETE: CPT

## 2021-11-15 PROCEDURE — 93227 HOLTER MONITOR - 48 HOUR (CUPID ONLY): ICD-10-PCS | Mod: ,,, | Performed by: INTERNAL MEDICINE

## 2021-11-15 PROCEDURE — 93306 TTE W/DOPPLER COMPLETE: CPT | Mod: 26,,, | Performed by: INTERNAL MEDICINE

## 2021-11-15 PROCEDURE — 93225 XTRNL ECG REC<48 HRS REC: CPT

## 2021-11-16 ENCOUNTER — TELEPHONE (OUTPATIENT)
Dept: CARDIOLOGY | Facility: CLINIC | Age: 51
End: 2021-11-16
Payer: COMMERCIAL

## 2021-11-18 LAB
OHS CV EVENT MONITOR DAY: 0
OHS CV HOLTER LENGTH DECIMAL HOURS: 38.9
OHS CV HOLTER LENGTH HOURS: 38
OHS CV HOLTER LENGTH MINUTES: 54
OHS CV HOLTER SINUS AVERAGE HR: 75
OHS CV HOLTER SINUS MAX HR: 124
OHS CV HOLTER SINUS MIN HR: 51

## 2021-11-22 ENCOUNTER — PATIENT MESSAGE (OUTPATIENT)
Dept: CARDIOLOGY | Facility: CLINIC | Age: 51
End: 2021-11-22
Payer: COMMERCIAL

## 2021-11-22 ENCOUNTER — TELEPHONE (OUTPATIENT)
Dept: CARDIOLOGY | Facility: CLINIC | Age: 51
End: 2021-11-22
Payer: COMMERCIAL

## 2021-12-02 PROBLEM — R94.2 ABNORMAL PFT: Status: ACTIVE | Noted: 2021-12-02

## 2021-12-02 PROBLEM — Z86.16 HISTORY OF 2019 NOVEL CORONAVIRUS DISEASE (COVID-19): Status: ACTIVE | Noted: 2021-11-03

## 2021-12-03 ENCOUNTER — OFFICE VISIT (OUTPATIENT)
Dept: PULMONOLOGY | Facility: CLINIC | Age: 51
End: 2021-12-03
Payer: COMMERCIAL

## 2021-12-03 VITALS
RESPIRATION RATE: 18 BRPM | DIASTOLIC BLOOD PRESSURE: 80 MMHG | HEIGHT: 66 IN | WEIGHT: 212.75 LBS | SYSTOLIC BLOOD PRESSURE: 122 MMHG | HEART RATE: 88 BPM | BODY MASS INDEX: 34.19 KG/M2 | OXYGEN SATURATION: 98 %

## 2021-12-03 DIAGNOSIS — R94.2 ABNORMAL PFT: Primary | ICD-10-CM

## 2021-12-03 DIAGNOSIS — R06.02 SOB (SHORTNESS OF BREATH): ICD-10-CM

## 2021-12-03 DIAGNOSIS — R06.02 SHORTNESS OF BREATH: ICD-10-CM

## 2021-12-03 DIAGNOSIS — Z86.16 HISTORY OF 2019 NOVEL CORONAVIRUS DISEASE (COVID-19): ICD-10-CM

## 2021-12-03 PROCEDURE — 4010F ACE/ARB THERAPY RXD/TAKEN: CPT | Mod: CPTII,S$GLB,, | Performed by: INTERNAL MEDICINE

## 2021-12-03 PROCEDURE — 99999 PR PBB SHADOW E&M-EST. PATIENT-LVL V: ICD-10-PCS | Mod: PBBFAC,,, | Performed by: INTERNAL MEDICINE

## 2021-12-03 PROCEDURE — 99205 OFFICE O/P NEW HI 60 MIN: CPT | Mod: S$GLB,,, | Performed by: INTERNAL MEDICINE

## 2021-12-03 PROCEDURE — 99205 PR OFFICE/OUTPT VISIT, NEW, LEVL V, 60-74 MIN: ICD-10-PCS | Mod: S$GLB,,, | Performed by: INTERNAL MEDICINE

## 2021-12-03 PROCEDURE — 99999 PR PBB SHADOW E&M-EST. PATIENT-LVL V: CPT | Mod: PBBFAC,,, | Performed by: INTERNAL MEDICINE

## 2021-12-03 PROCEDURE — 4010F PR ACE/ARB THEARPY RXD/TAKEN: ICD-10-PCS | Mod: CPTII,S$GLB,, | Performed by: INTERNAL MEDICINE

## 2021-12-03 RX ORDER — HYDROXYCHLOROQUINE SULFATE 200 MG/1
TABLET, FILM COATED ORAL
COMMUNITY
Start: 2021-11-03 | End: 2023-01-19

## 2021-12-07 ENCOUNTER — TELEPHONE (OUTPATIENT)
Dept: CARDIOLOGY | Facility: CLINIC | Age: 51
End: 2021-12-07
Payer: COMMERCIAL

## 2022-02-23 DIAGNOSIS — D84.9 IMMUNOSUPPRESSED STATUS: ICD-10-CM

## 2022-03-02 ENCOUNTER — PATIENT MESSAGE (OUTPATIENT)
Dept: RESEARCH | Facility: HOSPITAL | Age: 52
End: 2022-03-02
Payer: COMMERCIAL

## 2022-03-07 ENCOUNTER — TELEPHONE (OUTPATIENT)
Dept: CARDIOLOGY | Facility: CLINIC | Age: 52
End: 2022-03-07
Payer: COMMERCIAL

## 2022-03-07 NOTE — TELEPHONE ENCOUNTER
Left message for patient to return call for Dr. Peterson office to reschedule appointment on 3/11/22.

## 2022-03-10 ENCOUNTER — PATIENT MESSAGE (OUTPATIENT)
Dept: ORTHOPEDICS | Facility: CLINIC | Age: 52
End: 2022-03-10
Payer: COMMERCIAL

## 2022-03-25 ENCOUNTER — PATIENT MESSAGE (OUTPATIENT)
Dept: CARDIOLOGY | Facility: CLINIC | Age: 52
End: 2022-03-25
Payer: COMMERCIAL

## 2022-03-25 ENCOUNTER — PATIENT MESSAGE (OUTPATIENT)
Dept: ORTHOPEDICS | Facility: CLINIC | Age: 52
End: 2022-03-25
Payer: COMMERCIAL

## 2022-04-11 ENCOUNTER — LAB VISIT (OUTPATIENT)
Dept: LAB | Facility: HOSPITAL | Age: 52
End: 2022-04-11
Attending: ORTHOPAEDIC SURGERY
Payer: COMMERCIAL

## 2022-04-11 ENCOUNTER — OFFICE VISIT (OUTPATIENT)
Dept: ORTHOPEDICS | Facility: CLINIC | Age: 52
End: 2022-04-11
Payer: COMMERCIAL

## 2022-04-11 VITALS — WEIGHT: 210 LBS | HEIGHT: 66 IN | BODY MASS INDEX: 33.75 KG/M2

## 2022-04-11 DIAGNOSIS — M17.12 PRIMARY LOCALIZED OSTEOARTHRITIS OF LEFT KNEE: ICD-10-CM

## 2022-04-11 DIAGNOSIS — S83.242D ACUTE MEDIAL MENISCUS TEAR OF LEFT KNEE, SUBSEQUENT ENCOUNTER: Primary | ICD-10-CM

## 2022-04-11 DIAGNOSIS — S83.242D ACUTE MEDIAL MENISCUS TEAR OF LEFT KNEE, SUBSEQUENT ENCOUNTER: ICD-10-CM

## 2022-04-11 LAB
ANION GAP SERPL CALC-SCNC: 6 MMOL/L (ref 8–16)
APTT BLDCRRT: 23 SEC (ref 21–32)
BUN SERPL-MCNC: 16 MG/DL (ref 6–20)
CALCIUM SERPL-MCNC: 9.9 MG/DL (ref 8.7–10.5)
CHLORIDE SERPL-SCNC: 107 MMOL/L (ref 95–110)
CO2 SERPL-SCNC: 28 MMOL/L (ref 23–29)
CREAT SERPL-MCNC: 0.7 MG/DL (ref 0.5–1.4)
EST. GFR  (AFRICAN AMERICAN): >60 ML/MIN/1.73 M^2
EST. GFR  (NON AFRICAN AMERICAN): >60 ML/MIN/1.73 M^2
GLUCOSE SERPL-MCNC: 101 MG/DL (ref 70–110)
INR PPP: 0.9 (ref 0.8–1.2)
POTASSIUM SERPL-SCNC: 4.6 MMOL/L (ref 3.5–5.1)
PROTHROMBIN TIME: 10.2 SEC (ref 9–12.5)
SODIUM SERPL-SCNC: 141 MMOL/L (ref 136–145)

## 2022-04-11 PROCEDURE — 99999 PR PBB SHADOW E&M-EST. PATIENT-LVL V: ICD-10-PCS | Mod: PBBFAC,,, | Performed by: ORTHOPAEDIC SURGERY

## 2022-04-11 PROCEDURE — 99215 OFFICE O/P EST HI 40 MIN: CPT | Mod: S$GLB,,, | Performed by: ORTHOPAEDIC SURGERY

## 2022-04-11 PROCEDURE — 4010F PR ACE/ARB THEARPY RXD/TAKEN: ICD-10-PCS | Mod: CPTII,S$GLB,, | Performed by: ORTHOPAEDIC SURGERY

## 2022-04-11 PROCEDURE — 85025 COMPLETE CBC W/AUTO DIFF WBC: CPT | Performed by: ORTHOPAEDIC SURGERY

## 2022-04-11 PROCEDURE — 36415 COLL VENOUS BLD VENIPUNCTURE: CPT | Performed by: ORTHOPAEDIC SURGERY

## 2022-04-11 PROCEDURE — 3008F PR BODY MASS INDEX (BMI) DOCUMENTED: ICD-10-PCS | Mod: CPTII,S$GLB,, | Performed by: ORTHOPAEDIC SURGERY

## 2022-04-11 PROCEDURE — 99999 PR PBB SHADOW E&M-EST. PATIENT-LVL V: CPT | Mod: PBBFAC,,, | Performed by: ORTHOPAEDIC SURGERY

## 2022-04-11 PROCEDURE — 85610 PROTHROMBIN TIME: CPT | Performed by: ORTHOPAEDIC SURGERY

## 2022-04-11 PROCEDURE — 85730 THROMBOPLASTIN TIME PARTIAL: CPT | Performed by: ORTHOPAEDIC SURGERY

## 2022-04-11 PROCEDURE — 80048 BASIC METABOLIC PNL TOTAL CA: CPT | Performed by: ORTHOPAEDIC SURGERY

## 2022-04-11 PROCEDURE — 4010F ACE/ARB THERAPY RXD/TAKEN: CPT | Mod: CPTII,S$GLB,, | Performed by: ORTHOPAEDIC SURGERY

## 2022-04-11 PROCEDURE — 3008F BODY MASS INDEX DOCD: CPT | Mod: CPTII,S$GLB,, | Performed by: ORTHOPAEDIC SURGERY

## 2022-04-11 PROCEDURE — 99215 PR OFFICE/OUTPT VISIT, EST, LEVL V, 40-54 MIN: ICD-10-PCS | Mod: S$GLB,,, | Performed by: ORTHOPAEDIC SURGERY

## 2022-04-11 PROCEDURE — 1159F MED LIST DOCD IN RCRD: CPT | Mod: CPTII,S$GLB,, | Performed by: ORTHOPAEDIC SURGERY

## 2022-04-11 PROCEDURE — 1159F PR MEDICATION LIST DOCUMENTED IN MEDICAL RECORD: ICD-10-PCS | Mod: CPTII,S$GLB,, | Performed by: ORTHOPAEDIC SURGERY

## 2022-04-11 NOTE — PATIENT INSTRUCTIONS
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    Acute medial meniscus tear of left knee, subsequent encounter Yes    Primary localized osteoarthritis of left knee       Plan:  Mable has failed conservative treatment with time and formal physical therapy.  She continues to have medial joint line pain that are limiting her daily activites such as walking.  We have reviewed the natural history of this disorder and discussed treatment options.   We have explained the proposed procedure and recovery outline.  Left knee arthroscopy, medial meniscectomy versus repair, any indicated procedures  Will require PCP clearance and clearance for her lupus and PE history  Labs on way out today  We have reviewed her return to work expectation with variability based on the procedure performed    Follow-up: surgery or sooner if there are any problems between now and then.    Thank you for choosing [facility] and Dr. Rudy Dempsey for your orthopedic & sports medicine care. It is our goal to provide you with exceptional care that will help keep you healthy, active, and get you back in the game.    If you felt that you received exemplary care today, please consider leaving us feedback on [employer] at [email or web address]     Please do not hesitate to reach out to us via email, phone, or MyChart with any questions, concerns, or feedback.    If you are experiencing pain/discomfort ,or have questions after 5pm and would like to be connected to the [facility] on-call team, please call this number and specify which Sports Medicine provider is treating you: [phone]

## 2022-04-11 NOTE — PROGRESS NOTES
Patient ID: Mable Redding  YOB: 1970  MRN: [id number]    Chief Complaint: Pain of the Left Knee    Referred By: Dr. 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  Patient states that she was jumping rope and she felt a pop. She was treated conservatively by Dr. Zenobia Perez with PT with a personal friend of hers.  She has not improved much with therapy.  She continues to have pain in the medial and anterior knee, especially when she extends her knee while walking.  She uses a knee brace.  She would like to discuss the role of surgery in her treatment algorithm.    Pain  Pertinent negatives include no chest pain, chills, coughing, fever, joint swelling, nausea, numbness, rash or vomiting.   Knee Pain   The pain is present in the left knee. This is a chronic problem. The current episode started more than 1 month ago. The problem occurs intermittently. The problem has been unchanged. The quality of the pain is described as dull. The pain is at a severity of 3/10. Associated symptoms include stiffness. Pertinent negatives include no fever or numbness. The symptoms are aggravated by activity. She has tried cold, NSAIDs and brace/corset (elevation) for the symptoms. The treatment provided mild relief. Physical therapy was effective.      Past Medical History:   Past Medical History:   Diagnosis Date    Anxiety     Bursitis     Right hip    COVID-19 11/3/2021    Depression     Herpes genitalia     History of blood clots     HPV (human papilloma virus) infection     Lupus anticoagulant disorder     Lupus anticoagulant disorder 2019    Pulmonary embolus     Thyroid disease      Past Surgical History:   Procedure Laterality Date    ADENOIDECTOMY       SECTION      DILATION AND CURETTAGE OF UTERUS      HYSTERECTOMY  2017    HYSTEROSCOPY       LIPOMA RESECTION      ROBOTIC HYSTERECTOMY, WITH SALPINGECTOMY Bilateral     TONSILLECTOMY       Family History   Problem Relation Age of Onset    Hyperlipidemia Mother     Coronary artery disease Father     Hyperlipidemia Father     Hypertension Father     No Known Problems Sister     No Known Problems Brother     No Known Problems Maternal Aunt     No Known Problems Maternal Uncle     No Known Problems Paternal Aunt     No Known Problems Paternal Uncle     No Known Problems Maternal Grandmother     Diabetes type II Maternal Grandfather     Hypertension Paternal Grandmother     Hyperlipidemia Paternal Grandmother     Heart disease Paternal Grandmother     Lung cancer Paternal Grandfather     Amblyopia Neg Hx     Blindness Neg Hx     Cancer Neg Hx     Cataracts Neg Hx     Diabetes Neg Hx     Glaucoma Neg Hx     Macular degeneration Neg Hx     Retinal detachment Neg Hx     Strabismus Neg Hx     Stroke Neg Hx     Thyroid disease Neg Hx      Social History     Socioeconomic History    Marital status:    Tobacco Use    Smoking status: Former Smoker     Packs/day: 0.25     Years: 10.00     Pack years: 2.50    Smokeless tobacco: Never Used   Substance and Sexual Activity    Alcohol use: No    Drug use: No    Sexual activity: Yes     Partners: Male     Birth control/protection: See Surgical Hx     Medication List with Changes/Refills   Current Medications    ALPRAZOLAM (XANAX) 0.25 MG TABLET    Take 0.25 mg by mouth 3 (three) times daily.    ASPIRIN (ECOTRIN) 81 MG EC TABLET        CLOBETASOL (CLOBEX) 0.05 % SHAMPOO    APPLY TOPICALLY EVERY DAY    CLOTRIMAZOLE-BETAMETHASONE 1-0.05% (LOTRISONE) CREAM    APPLY CREAM TO AFFECTED AREA TWICE DAILY    DOCUSATE SODIUM (COLACE) 100 MG CAPSULE    300 mg.     EZETIMIBE (ZETIA) 10 MG TABLET    Take 10 mg by mouth every morning.    FINASTERIDE (PROSCAR) 5 MG TABLET    Take 5 mg by mouth once daily.    FLUTICASONE PROPIONATE (FLONASE) 50  MCG/ACTUATION NASAL SPRAY    Use 1 spray (50 mcg total) by Each Nostril route once daily.    HYDROCHLOROTHIAZIDE (HYDRODIURIL) 25 MG TABLET    TAKE 1 TABLET(25 MG) BY MOUTH EVERY DAY    HYDROCODONE-ACETAMINOPHEN (NORCO)  MG PER TABLET    Take 1 tablet by mouth every 4 (four) hours as needed for Pain.    HYDROCORTISONE (WESTCORT) 0.2 % CREAM    PERCY EXT AA BID    HYDROXYCHLOROQUINE (PLAQUENIL) 200 MG TABLET    Take by mouth.    IVERMECTIN (STROMECTOL) 3 MG TAB    SMARTSI Tablet(s) By Mouth Once    LORATADINE (CLARITIN) 10 MG TABLET    loratadine 10 mg oral tablet take 1 tablet (10 mg) by oral route once daily   Suspended    LOSARTAN (COZAAR) 100 MG TABLET    Take 100 mg by mouth nightly.    METOPROLOL SUCCINATE (TOPROL-XL) 25 MG 24 HR TABLET    Take 1 tablet (25 mg total) by mouth once daily.    MUPIROCIN (BACTROBAN) 2 % OINTMENT    Apply to nostrils 2 (two) times daily.    NP THYROID 90 MG TAB    120 mg.     ONDANSETRON (ZOFRAN) 4 MG TABLET    Take 1 tablet (4 mg total) by mouth every 6 (six) hours.    ONDANSETRON (ZOFRAN-ODT) 4 MG TBDL    Take 1 tablet (4 mg total) by mouth 2 (two) times daily.    PANTOPRAZOLE (PROTONIX) 40 MG TABLET    Take 40 mg by mouth once daily.    PROMETHAZINE (PHENERGAN) 25 MG TABLET    TAKE ONE TABLET BY MOUTH EVERY FOUR HOURS AS NEEDED FOR NAUSEA    ROSUVASTATIN (CRESTOR) 40 MG TAB        VENLAFAXINE (EFFEXOR XR) 75 MG 24 HR CAPSULE    Take 1 capsule (75 mg total) by mouth once daily.    VITAMIN D 1000 UNITS TAB    Take 5,000 Units by mouth once daily.      Review of patient's allergies indicates:   Allergen Reactions    Aldactone [spironolactone]     Remdesivir Hives    Sulfa (sulfonamide antibiotics) Edema     Review of Systems   Constitutional: Negative for chills and fever.   HENT: Negative for ear discharge and hearing loss.    Eyes: Negative for blurred vision and visual disturbance.   Cardiovascular: Negative for chest pain and leg swelling.   Respiratory: Negative for  cough and shortness of breath.    Endocrine: Negative for polyuria.   Hematologic/Lymphatic: Negative for bleeding problem.   Skin: Negative for rash.   Musculoskeletal: Positive for joint pain and stiffness. Negative for back pain, joint swelling, muscle cramps and muscle weakness.   Gastrointestinal: Negative for nausea and vomiting.   Genitourinary: Negative for hematuria.   Neurological: Negative for loss of balance, numbness and paresthesias.   Psychiatric/Behavioral: Negative for altered mental status.       Physical Exam:   Body mass index is 33.89 kg/m².  There were no vitals filed for this visit.   GENERAL: Well appearing, appropriate for stated age, no acute distress.  CARDIOVASCULAR: Pulses regular by peripheral palpation.  PULMONARY: Respirations are even and non-labored.  NEURO: Awake, alert, and oriented x 3.  PSYCH: Mood & affect are appropriate.  HEENT: Head is normocephalic and atraumatic.    Left Knee:    Inspection: Normal    Palpation tenderness: Medial joint line    Range of motion: 0 deg extension - 130 deg flexion    Strength:  5/5 Extension    5/5 Flexion    5/5 Hip Abduction    Meniscus Exam: Pain with Samara's     Stability: Negative ACL/Lachman      Negative Posterior Drawer      Negative MCL/Valgus Stress      Negative LCL/Varus Stress     Patella Exam: Negative J-sign   Negative Patellar apprehension   Negative Patellar grind     N/V Exam:  Tibial:    Normal sensory (plantar foot)  Normal motor (FHL)    Sup Peroneal:   Normal sensory (dorsal foot)  Normal motor (Peroneals)            Deep Peroneal:   Normal sensory (1st web space)  Normal motor (EHL)    Sural:   Normal sensory (lateral foot)   Saphenous:   Normal sensory (medial lower leg)   Normal pedal pulses, warm and well perfused with capillary refill < 2 sec   Patella tendon reflex normal  Achilles tendon reflex normal     Imaging:   XR Results:  Results for orders placed during the hospital encounter of 08/02/21    X-ray Knee  Ortho Left    Narrative  EXAMINATION:  XR KNEE ORTHO LEFT    CLINICAL HISTORY:  Pain in left knee    TECHNIQUE:  AP standing of both knees, Merchant views of both knees as well as a lateral view of the left knee were performed.    COMPARISON:  None    FINDINGS:  There is no evidence fracture or malalignment.  The adjacent soft tissues are unremarkable.  There is a small left knee joint effusion.    Impression  There is a small left knee joint effusion.      Electronically signed by: Alysia Howe MD  Date:    08/02/2021  Time:    12:07    MRI Results:          Relevant imaging results reviewed and interpreted by me, discussed with the patient and / or family today. I agree with findings stated above.       Other Tests:     No other tests performed today.    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    Acute medial meniscus tear of left knee, subsequent encounter Yes    Primary localized osteoarthritis of left knee       Plan:   Mable has failed conservative treatment with time and formal physical therapy.  She continues to have medial joint line pain that are limiting her daily activites such as walking.  We have reviewed the natural history of this disorder and discussed treatment options.    We have explained the proposed procedure and recovery outline.   Left knee arthroscopy, medial meniscectomy versus repair, any indicated procedures   Will require PCP clearance and clearance for her lupus and PE history   Labs on way out today   We have reviewed her return to work expectation with variability based on the procedure performed    Follow-up: surgery or sooner if there are any problems between now and then.    Thank you for choosing [facility]  and Dr. Rudy Dempsey for your orthopedic & sports medicine care. It is our goal to provide you with exceptional care that will help keep you healthy, active, and get you back in the game.    If you felt that you received exemplary care today, please consider leaving us feedback on [employer] at [email or web address].     Please do not hesitate to reach out to us via email, phone, or MyChart with any questions, concerns, or feedback.    If you are experiencing pain/discomfort ,or have questions after 5pm and would like to be connected to the [facility] on-call team, please call this number and specify which Sports Medicine provider is treating you: [phone]         Provider Note/Medical Decision Making:      I had a long discussion with the patient and any present family regarding treatment options. I explained that although the meniscus will usually not heal on its own, not all meniscus tears need surgery. We discussed that many people will improve with therapy and nonoperative management. We discussed the blood supply of the meniscus and the fact that some patients and some tear patterns are more amenable to repair. We discussed that when performing operative treatment of meniscus tears, we attempt to repair tears that we think need to be repaired and have a good chance of healing. If we do perform a meniscectomy, we attempt to leave as much meniscus intact as possible. We discussed the implications of having a torn or deficient meniscus. We discussed the rehab, weight bearing, and postoperative differences between repair and resection, and we discussed postoperative expectations.    I had a long discussion with the patient about treatment options, including operative and nonoperative treatments. We discussed pros and cons of each including risks pertinent to surgery including pain, infection, bleeding, damage to adjacent  structures like nerves and blood vessels, failure to heal, need for future surgeries, stiffness, instability, loss of limb, anesthesia risks like stroke, blood clot, loss of life. We discussed the possibility of need for later hardware removal in the case that hardware was used. We discussed common and uncommon risks, and discussed patient specific factors that may increase the risks present with surgery, including previous PE history and increased risks.. All questions were answered. The patient expressed understanding of the pros and cons of surgery and wanted to proceed with surgical treatment.    We discussed COVID19 with the patient, they are aware of our current policies and procedures, were given the option of delaying surgery, and they elect to proceed. All questions were answered.     I discussed worrisome and red flag signs and symptoms with the patient. The patient expressed understanding and agreed to alert me immediately or to go to the emergency room if they experience any of these.    Treatment plan was developed with input from the patient/family, and they expressed understanding and agreement with the plan. All questions were answered today.    Disclaimer: This note was prepared using a voice recognition system and is likely to have sound alike errors within the text.     I, Jesse Jones, acted as a scribe for Rudy Dempsey MD for the duration of this office visit.

## 2022-04-12 LAB
BASOPHILS # BLD AUTO: 0.03 K/UL (ref 0–0.2)
BASOPHILS NFR BLD: 0.5 % (ref 0–1.9)
DIFFERENTIAL METHOD: ABNORMAL
EOSINOPHIL # BLD AUTO: 0.4 K/UL (ref 0–0.5)
EOSINOPHIL NFR BLD: 6.6 % (ref 0–8)
ERYTHROCYTE [DISTWIDTH] IN BLOOD BY AUTOMATED COUNT: 13.2 % (ref 11.5–14.5)
HCT VFR BLD AUTO: 43.3 % (ref 37–48.5)
HGB BLD-MCNC: 13.9 G/DL (ref 12–16)
IMM GRANULOCYTES # BLD AUTO: 0.02 K/UL (ref 0–0.04)
IMM GRANULOCYTES NFR BLD AUTO: 0.3 % (ref 0–0.5)
LYMPHOCYTES # BLD AUTO: 1.9 K/UL (ref 1–4.8)
LYMPHOCYTES NFR BLD: 29.4 % (ref 18–48)
MCH RBC QN AUTO: 26.5 PG (ref 27–31)
MCHC RBC AUTO-ENTMCNC: 32.1 G/DL (ref 32–36)
MCV RBC AUTO: 83 FL (ref 82–98)
MONOCYTES # BLD AUTO: 0.5 K/UL (ref 0.3–1)
MONOCYTES NFR BLD: 7 % (ref 4–15)
NEUTROPHILS # BLD AUTO: 3.7 K/UL (ref 1.8–7.7)
NEUTROPHILS NFR BLD: 56.2 % (ref 38–73)
NRBC BLD-RTO: 0 /100 WBC
PLATELET # BLD AUTO: 295 K/UL (ref 150–450)
PMV BLD AUTO: 9.7 FL (ref 9.2–12.9)
RBC # BLD AUTO: 5.24 M/UL (ref 4–5.4)
WBC # BLD AUTO: 6.56 K/UL (ref 3.9–12.7)

## 2022-04-18 ENCOUNTER — LAB VISIT (OUTPATIENT)
Dept: LAB | Facility: HOSPITAL | Age: 52
End: 2022-04-18
Attending: INTERNAL MEDICINE
Payer: COMMERCIAL

## 2022-04-18 ENCOUNTER — OFFICE VISIT (OUTPATIENT)
Dept: HEMATOLOGY/ONCOLOGY | Facility: CLINIC | Age: 52
End: 2022-04-18
Payer: COMMERCIAL

## 2022-04-18 VITALS
DIASTOLIC BLOOD PRESSURE: 95 MMHG | BODY MASS INDEX: 33.87 KG/M2 | HEIGHT: 66 IN | OXYGEN SATURATION: 98 % | WEIGHT: 210.75 LBS | SYSTOLIC BLOOD PRESSURE: 142 MMHG | RESPIRATION RATE: 18 BRPM

## 2022-04-18 DIAGNOSIS — D68.61 ANTI-PHOSPHOLIPID SYNDROME: ICD-10-CM

## 2022-04-18 LAB
ALBUMIN SERPL BCP-MCNC: 4.3 G/DL (ref 3.5–5.2)
ALP SERPL-CCNC: 74 U/L (ref 55–135)
ALT SERPL W/O P-5'-P-CCNC: 40 U/L (ref 10–44)
ANION GAP SERPL CALC-SCNC: 10 MMOL/L (ref 8–16)
AST SERPL-CCNC: 29 U/L (ref 10–40)
BASOPHILS # BLD AUTO: 0.02 K/UL (ref 0–0.2)
BASOPHILS NFR BLD: 0.3 % (ref 0–1.9)
BILIRUB SERPL-MCNC: 0.8 MG/DL (ref 0.1–1)
BUN SERPL-MCNC: 14 MG/DL (ref 6–20)
CALCIUM SERPL-MCNC: 10.5 MG/DL (ref 8.7–10.5)
CHLORIDE SERPL-SCNC: 101 MMOL/L (ref 95–110)
CO2 SERPL-SCNC: 28 MMOL/L (ref 23–29)
CREAT SERPL-MCNC: 0.8 MG/DL (ref 0.5–1.4)
DIFFERENTIAL METHOD: ABNORMAL
EOSINOPHIL # BLD AUTO: 0.5 K/UL (ref 0–0.5)
EOSINOPHIL NFR BLD: 6.5 % (ref 0–8)
ERYTHROCYTE [DISTWIDTH] IN BLOOD BY AUTOMATED COUNT: 13.4 % (ref 11.5–14.5)
EST. GFR  (AFRICAN AMERICAN): >60 ML/MIN/1.73 M^2
EST. GFR  (NON AFRICAN AMERICAN): >60 ML/MIN/1.73 M^2
GLUCOSE SERPL-MCNC: 79 MG/DL (ref 70–110)
HCT VFR BLD AUTO: 42.5 % (ref 37–48.5)
HGB BLD-MCNC: 14.2 G/DL (ref 12–16)
IMM GRANULOCYTES # BLD AUTO: 0.01 K/UL (ref 0–0.04)
IMM GRANULOCYTES NFR BLD AUTO: 0.1 % (ref 0–0.5)
LYMPHOCYTES # BLD AUTO: 2.7 K/UL (ref 1–4.8)
LYMPHOCYTES NFR BLD: 37 % (ref 18–48)
MCH RBC QN AUTO: 27.2 PG (ref 27–31)
MCHC RBC AUTO-ENTMCNC: 33.4 G/DL (ref 32–36)
MCV RBC AUTO: 81 FL (ref 82–98)
MONOCYTES # BLD AUTO: 0.5 K/UL (ref 0.3–1)
MONOCYTES NFR BLD: 6.9 % (ref 4–15)
NEUTROPHILS # BLD AUTO: 3.5 K/UL (ref 1.8–7.7)
NEUTROPHILS NFR BLD: 49.2 % (ref 38–73)
NRBC BLD-RTO: 0 /100 WBC
PLATELET # BLD AUTO: 262 K/UL (ref 150–450)
PMV BLD AUTO: 8.9 FL (ref 9.2–12.9)
POTASSIUM SERPL-SCNC: 3.8 MMOL/L (ref 3.5–5.1)
PROT SERPL-MCNC: 7.3 G/DL (ref 6–8.4)
RBC # BLD AUTO: 5.22 M/UL (ref 4–5.4)
SODIUM SERPL-SCNC: 139 MMOL/L (ref 136–145)
WBC # BLD AUTO: 7.21 K/UL (ref 3.9–12.7)

## 2022-04-18 PROCEDURE — 99999 PR PBB SHADOW E&M-EST. PATIENT-LVL V: ICD-10-PCS | Mod: PBBFAC,,, | Performed by: INTERNAL MEDICINE

## 2022-04-18 PROCEDURE — 99999 PR PBB SHADOW E&M-EST. PATIENT-LVL V: CPT | Mod: PBBFAC,,, | Performed by: INTERNAL MEDICINE

## 2022-04-18 PROCEDURE — 36415 COLL VENOUS BLD VENIPUNCTURE: CPT | Performed by: INTERNAL MEDICINE

## 2022-04-18 PROCEDURE — 99214 OFFICE O/P EST MOD 30 MIN: CPT | Mod: S$GLB,,, | Performed by: INTERNAL MEDICINE

## 2022-04-18 PROCEDURE — 86147 CARDIOLIPIN ANTIBODY EA IG: CPT | Mod: 59 | Performed by: INTERNAL MEDICINE

## 2022-04-18 PROCEDURE — 4010F ACE/ARB THERAPY RXD/TAKEN: CPT | Mod: CPTII,S$GLB,, | Performed by: INTERNAL MEDICINE

## 2022-04-18 PROCEDURE — 4010F PR ACE/ARB THEARPY RXD/TAKEN: ICD-10-PCS | Mod: CPTII,S$GLB,, | Performed by: INTERNAL MEDICINE

## 2022-04-18 PROCEDURE — 3080F DIAST BP >= 90 MM HG: CPT | Mod: CPTII,S$GLB,, | Performed by: INTERNAL MEDICINE

## 2022-04-18 PROCEDURE — 3008F PR BODY MASS INDEX (BMI) DOCUMENTED: ICD-10-PCS | Mod: CPTII,S$GLB,, | Performed by: INTERNAL MEDICINE

## 2022-04-18 PROCEDURE — 99214 PR OFFICE/OUTPT VISIT, EST, LEVL IV, 30-39 MIN: ICD-10-PCS | Mod: S$GLB,,, | Performed by: INTERNAL MEDICINE

## 2022-04-18 PROCEDURE — 3077F SYST BP >= 140 MM HG: CPT | Mod: CPTII,S$GLB,, | Performed by: INTERNAL MEDICINE

## 2022-04-18 PROCEDURE — 86146 BETA-2 GLYCOPROTEIN ANTIBODY: CPT | Performed by: INTERNAL MEDICINE

## 2022-04-18 PROCEDURE — 1159F PR MEDICATION LIST DOCUMENTED IN MEDICAL RECORD: ICD-10-PCS | Mod: CPTII,S$GLB,, | Performed by: INTERNAL MEDICINE

## 2022-04-18 PROCEDURE — 83090 ASSAY OF HOMOCYSTEINE: CPT | Performed by: INTERNAL MEDICINE

## 2022-04-18 PROCEDURE — 85305 CLOT INHIBIT PROT S TOTAL: CPT | Performed by: INTERNAL MEDICINE

## 2022-04-18 PROCEDURE — 85610 PROTHROMBIN TIME: CPT | Performed by: INTERNAL MEDICINE

## 2022-04-18 PROCEDURE — 85025 COMPLETE CBC W/AUTO DIFF WBC: CPT | Performed by: INTERNAL MEDICINE

## 2022-04-18 PROCEDURE — 3080F PR MOST RECENT DIASTOLIC BLOOD PRESSURE >= 90 MM HG: ICD-10-PCS | Mod: CPTII,S$GLB,, | Performed by: INTERNAL MEDICINE

## 2022-04-18 PROCEDURE — 86148 ANTI-PHOSPHOLIPID ANTIBODY: CPT | Mod: 91 | Performed by: INTERNAL MEDICINE

## 2022-04-18 PROCEDURE — 81240 F2 GENE: CPT | Performed by: INTERNAL MEDICINE

## 2022-04-18 PROCEDURE — 3008F BODY MASS INDEX DOCD: CPT | Mod: CPTII,S$GLB,, | Performed by: INTERNAL MEDICINE

## 2022-04-18 PROCEDURE — 81241 F5 GENE: CPT | Performed by: INTERNAL MEDICINE

## 2022-04-18 PROCEDURE — 85300 ANTITHROMBIN III ACTIVITY: CPT | Performed by: INTERNAL MEDICINE

## 2022-04-18 PROCEDURE — 1159F MED LIST DOCD IN RCRD: CPT | Mod: CPTII,S$GLB,, | Performed by: INTERNAL MEDICINE

## 2022-04-18 PROCEDURE — 86147 CARDIOLIPIN ANTIBODY EA IG: CPT | Performed by: INTERNAL MEDICINE

## 2022-04-18 PROCEDURE — 3077F PR MOST RECENT SYSTOLIC BLOOD PRESSURE >= 140 MM HG: ICD-10-PCS | Mod: CPTII,S$GLB,, | Performed by: INTERNAL MEDICINE

## 2022-04-18 PROCEDURE — 85303 CLOT INHIBIT PROT C ACTIVITY: CPT | Performed by: INTERNAL MEDICINE

## 2022-04-18 PROCEDURE — 80053 COMPREHEN METABOLIC PANEL: CPT | Performed by: INTERNAL MEDICINE

## 2022-04-18 NOTE — PROGRESS NOTES
Subjective:   Date of Visit: 4/18/22   ?   ?    REFERRING PROVIDER: Aaareferral Self  No address on file   ?   CHIEF COMPLAINT: Pre-op clearance???????   ?     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    Review of Systems   Constitutional: Positive for fatigue. Negative for activity change, appetite change, chills, fever and unexpected weight change.   HENT: Negative for hearing loss, mouth sores, nosebleeds, sore throat, tinnitus, trouble swallowing and voice change.    Eyes: Negative for visual disturbance.   Respiratory: Negative for cough, chest tightness and shortness of breath.    Cardiovascular: Negative for chest pain, palpitations and leg swelling.   Gastrointestinal: Negative for abdominal pain, anal bleeding, blood in stool, constipation, diarrhea, nausea and vomiting.   Genitourinary: Negative for dysuria, frequency, hematuria, pelvic pain, vaginal bleeding and vaginal pain.   Musculoskeletal: Positive for joint swelling. Negative for arthralgias, back pain and neck pain.   Skin: Negative for color change, pallor, rash and wound.   Allergic/Immunologic: Negative for immunocompromised state.   Neurological: Negative for dizziness, tremors, syncope, speech difficulty, weakness, light-headedness and headaches.   Hematological: Negative for adenopathy. Does not bruise/bleed easily.   Psychiatric/Behavioral: Negative for agitation, confusion, decreased concentration, hallucinations and sleep disturbance. The patient is not nervous/anxious.        ?   PAST MEDICAL HISTORY:   Past Medical History:   Diagnosis Date    Anxiety     Bursitis     Right hip    COVID-19 11/3/2021    Depression     Herpes genitalia     History of blood clots     HPV (human papilloma virus) infection     Lupus anticoagulant disorder     Lupus anticoagulant disorder 1/30/2019    Pulmonary embolus      Thyroid disease     ?     PAST SURGICAL HISTORY:   Past Surgical History:   Procedure Laterality Date    ADENOIDECTOMY       SECTION      DILATION AND CURETTAGE OF UTERUS      HYSTERECTOMY  2017    HYSTEROSCOPY      LIPOMA RESECTION      ROBOTIC HYSTERECTOMY, WITH SALPINGECTOMY Bilateral     TONSILLECTOMY        ?   ALLERGIES:   Allergies as of 2022 - Reviewed 2022   Allergen Reaction Noted    Aldactone [spironolactone]  2017    Remdesivir Hives 10/06/2021    Sulfa (sulfonamide antibiotics) Edema 2017      ?   MEDICATIONS:?   Outpatient Medications Marked as Taking for the 22 encounter (Office Visit) with Heron Pinto MD   Medication Sig Dispense Refill    alprazolam (XANAX) 0.25 MG tablet Take 0.25 mg by mouth 3 (three) times daily.      aspirin (ECOTRIN) 81 MG EC tablet       docusate sodium (COLACE) 100 MG capsule 300 mg.       ezetimibe (ZETIA) 10 mg tablet Take 10 mg by mouth every morning.      finasteride (PROSCAR) 5 mg tablet Take 5 mg by mouth once daily.      fluticasone propionate (FLONASE) 50 mcg/actuation nasal spray Use 1 spray (50 mcg total) by Each Nostril route once daily. 16 g 3    hydroCHLOROthiazide (HYDRODIURIL) 25 MG tablet TAKE 1 TABLET(25 MG) BY MOUTH EVERY DAY 90 tablet 3    hydrocortisone (WESTCORT) 0.2 % cream PERCY EXT AA BID      loratadine (CLARITIN) 10 mg tablet loratadine 10 mg oral tablet take 1 tablet (10 mg) by oral route once daily   Suspended      losartan (COZAAR) 100 MG tablet Take 100 mg by mouth nightly.      metoprolol succinate (TOPROL-XL) 25 MG 24 hr tablet Take 1 tablet (25 mg total) by mouth once daily. 30 tablet 11    mupirocin (BACTROBAN) 2 % ointment Apply to nostrils 2 (two) times daily. 22 g 2    NP THYROID 90 mg Tab 120 mg.       pantoprazole (PROTONIX) 40 MG tablet Take 40 mg by mouth once daily.      rosuvastatin (CRESTOR) 40 MG Tab       venlafaxine (EFFEXOR XR) 75 MG 24 hr capsule Take 1  capsule (75 mg total) by mouth once daily. 90 capsule 3    vitamin D 1000 units Tab Take 5,000 Units by mouth once daily.        Current Facility-Administered Medications for the 4/18/22 encounter (Office Visit) with Heron Pinto MD   Medication Dose Route Frequency Provider Last Rate Last Admin    acetaminophen tablet 650 mg  650 mg Oral Once PRN Boone Saha MD        albuterol inhaler 2 puff  2 puff Inhalation Q20 Min PRN Boone Saha MD        diphenhydrAMINE injection 25 mg  25 mg Intravenous Once PRN Boone Saha MD        EPINEPHrine (EPIPEN) 0.3 mg/0.3 mL pen injection 0.3 mg  0.3 mg Intramuscular PRN Boone Saha MD        methylPREDNISolone sodium succinate injection 40 mg  40 mg Intravenous Once PRN Boone Saha MD        ondansetron disintegrating tablet 4 mg  4 mg Oral Once PRN Boone Saha MD        sodium chloride 0.9% 500 mL flush bag   Intravenous PRN Boone Saha MD        sodium chloride 0.9% flush 10 mL  10 mL Intravenous PRN Boone Saha MD          ?   SOCIAL HISTORY:?   Social History     Tobacco Use    Smoking status: Former Smoker     Packs/day: 0.25     Years: 10.00     Pack years: 2.50    Smokeless tobacco: Never Used   Substance Use Topics    Alcohol use: No        ?   FAMILY HISTORY:   family history includes Coronary artery disease in her father; Diabetes type II in her maternal grandfather; Heart disease in her paternal grandmother; Hyperlipidemia in her father, mother, and paternal grandmother; Hypertension in her father and paternal grandmother; Lung cancer in her paternal grandfather; No Known Problems in her brother, maternal aunt, maternal grandmother, maternal uncle, paternal aunt, paternal uncle, and sister.   ?     Objective:      Physical Exam  Constitutional:       General: She is not in acute distress.     Appearance: She is well-developed. She is obese. She is not ill-appearing or toxic-appearing.   HENT:      Head:  Normocephalic and atraumatic.      Mouth/Throat:      Pharynx: No oropharyngeal exudate.   Eyes:      General: No scleral icterus.        Right eye: No discharge.         Left eye: No discharge.      Conjunctiva/sclera: Conjunctivae normal.      Pupils: Pupils are equal, round, and reactive to light.   Neck:      Thyroid: No thyromegaly.   Cardiovascular:      Rate and Rhythm: Normal rate and regular rhythm.      Heart sounds: No murmur heard.  Pulmonary:      Effort: Pulmonary effort is normal. No respiratory distress.      Breath sounds: Normal breath sounds.   Chest:      Chest wall: No tenderness.   Breasts:      Right: No supraclavicular adenopathy.      Left: No supraclavicular adenopathy.       Abdominal:      General: Bowel sounds are normal. There is no distension.      Palpations: Abdomen is soft. There is no mass.      Tenderness: There is no abdominal tenderness. There is no guarding or rebound.   Musculoskeletal:         General: No tenderness. Normal range of motion.      Cervical back: Normal range of motion and neck supple.   Lymphadenopathy:      Cervical: No cervical adenopathy.      Right cervical: No superficial cervical adenopathy.     Left cervical: No superficial cervical adenopathy.      Upper Body:      Right upper body: No supraclavicular or pectoral adenopathy.      Left upper body: No supraclavicular or pectoral adenopathy.   Skin:     General: Skin is warm and dry.      Capillary Refill: Capillary refill takes 2 to 3 seconds.      Coloration: Skin is not pale.      Findings: No erythema or rash.   Neurological:      Mental Status: She is alert and oriented to person, place, and time.      Cranial Nerves: No cranial nerve deficit.      Sensory: No sensory deficit.      Gait: Gait abnormal.   Psychiatric:         Behavior: Behavior normal. Behavior is cooperative.         Judgment: Judgment normal.       ?   Vitals:    04/18/22 1518   BP: (!) 142/95   Resp: 18      ?     ECOG SCORE    2 -  Capable of all selfcare but unable to carry out any work activities, active > 50% of hours     ?   Laboratory:  ?   No visits with results within 1 Day(s) from this visit.   Latest known visit with results is:   Lab Visit on 04/11/2022   Component Date Value Ref Range Status    Sodium 04/11/2022 141  136 - 145 mmol/L Final    Potassium 04/11/2022 4.6  3.5 - 5.1 mmol/L Final    Chloride 04/11/2022 107  95 - 110 mmol/L Final    CO2 04/11/2022 28  23 - 29 mmol/L Final    Glucose 04/11/2022 101  70 - 110 mg/dL Final    BUN 04/11/2022 16  6 - 20 mg/dL Final    Creatinine 04/11/2022 0.7  0.5 - 1.4 mg/dL Final    Calcium 04/11/2022 9.9  8.7 - 10.5 mg/dL Final    Anion Gap 04/11/2022 6 (A) 8 - 16 mmol/L Final    eGFR if African American 04/11/2022 >60.0  >60 mL/min/1.73 m^2 Final    eGFR if non African American 04/11/2022 >60.0  >60 mL/min/1.73 m^2 Final    Prothrombin Time 04/11/2022 10.2  9.0 - 12.5 sec Final    INR 04/11/2022 0.9  0.8 - 1.2 Final    aPTT 04/11/2022 23.0  21.0 - 32.0 sec Final    WBC 04/11/2022 6.56  3.90 - 12.70 K/uL Final    RBC 04/11/2022 5.24  4.00 - 5.40 M/uL Final    Hemoglobin 04/11/2022 13.9  12.0 - 16.0 g/dL Final    Hematocrit 04/11/2022 43.3  37.0 - 48.5 % Final    MCV 04/11/2022 83  82 - 98 fL Final    MCH 04/11/2022 26.5 (A) 27.0 - 31.0 pg Final    MCHC 04/11/2022 32.1  32.0 - 36.0 g/dL Final    RDW 04/11/2022 13.2  11.5 - 14.5 % Final    Platelets 04/11/2022 295  150 - 450 K/uL Final    MPV 04/11/2022 9.7  9.2 - 12.9 fL Final    Immature Granulocytes 04/11/2022 0.3  0.0 - 0.5 % Final    Gran # (ANC) 04/11/2022 3.7  1.8 - 7.7 K/uL Final    Immature Grans (Abs) 04/11/2022 0.02  0.00 - 0.04 K/uL Final    Lymph # 04/11/2022 1.9  1.0 - 4.8 K/uL Final    Mono # 04/11/2022 0.5  0.3 - 1.0 K/uL Final    Eos # 04/11/2022 0.4  0.0 - 0.5 K/uL Final    Baso # 04/11/2022 0.03  0.00 - 0.20 K/uL Final    nRBC 04/11/2022 0  0 /100 WBC Final    Gran % 04/11/2022 56.2  38.0 -  73.0 % Final    Lymph % 04/11/2022 29.4  18.0 - 48.0 % Final    Mono % 04/11/2022 7.0  4.0 - 15.0 % Final    Eosinophil % 04/11/2022 6.6  0.0 - 8.0 % Final    Basophil % 04/11/2022 0.5  0.0 - 1.9 % Final    Differential Method 04/11/2022 Automated   Final      ?   Tumor markers   ?   ?   Imaging: Mammo Digital Screening Bilat w/ Nikita  Narrative: Result:   Mammo Digital Screening Bilat w/ Nikita     History:  Patient is 51 y.o. and is seen for a screening mammogram.    Films Compared:  Prior images (if available) were compared.     Findings:  This procedure was performed using tomosynthesis. Computer-aided detection   was utilized in the interpretation of this examination.  The breasts have scattered areas of fibroglandular density.     Left  There is an intramammary lymph node seen in the left breast. Compared to   the previous study, there are no significant changes.     Right  There is no evidence of suspicious masses, calcifications, or other   abnormal findings in the right breast.  Impression: Bilateral  There is no mammographic evidence of malignancy.    BI-RADS Category:   Overall: 2 - Benign     Recommendation:  Routine screening mammogram in 1 year is recommended.     ?      Pathology:  Pathology Results  (Last 10 years)    None           ?   Assessment/Plan:       1. Anti-phospholipid syndrome          Anti-phospholipid syndrome  Advised that given presents of vascular thrombosis and lupus anticoagulant, will recommend long-term anticoagulation, preferably with vitamin K dependent anticoagulant such as Coumadin plus anti-platelet therapy such as low-dose aspirin.    Will plan to repeat hypercoagulable assessment prior to further recommendations. Regarding upcoming surgery, will recommend bridging with Lovenox.  Return in 2 weeks with repeat labs or sooner if needed.    Anti-phospholipid syndrome  Clinical criteria  1. Vascular thrombosis¶  One or more clinical episodesÄ of arterial, venous, or small  vessel thrombosis?, in any tissue or organ. Thrombosis must be confirmed by objective validated criteria (ie, unequivocal findings of appropriate imaging studies or histopathology). For histopathologic confirmation, thrombosis should be present without significant evidence of inflammation in the vessel wall.  2. Pregnancy morbidity   1. One or more unexplained deaths of a morphologically normal fetus at or beyond the 10th week of gestation, with normal fetal morphology documented by ultrasound or by direct examination of the fetus; or  2. One or more premature births of a morphologically normal  before the 34th week of gestation because of: (i) eclampsia or severe preeclampsia defined according to standard definitions, or (ii) recognized features of placental insufficiency§; or  3. Three or more unexplained consecutive spontaneous abortions before the 10th week of gestation, with maternal anatomic or hormonal abnormalities and paternal and maternal chromosomal causes excluded.    Laboratory criteria  2. LA present in plasma, on 2 or more occasions at least 12 weeks apart, detected according to the guidelines of the International Society on Thrombosis and Haemostasis (Scientific Subcommittee on LAs/phospholipid-dependent antibodies).  3. aCL of IgG and/or IgM isotype in serum or plasma, present in medium or high titer (ie, >40 GPL or MPL, or >the 99th percentile), on 2 or more occasions, at least 12 weeks apart, measured by a standardized WILLIAM.  3. Anti-beta2 glycoprotein I antibody of IgG and/or IgM isotype in serum or plasma (in titer >the 99th percentile), present on 2 or more occasions, at least 12 weeks apart, measured by a standardized WILLIAM, according to recommended procedures.          Update: Following hypercoagulable assessment, we did not detect lupus anticoagulant or gene mutation. She is cleared to proceed with surgery. Recommend following standard post surgery anticoagulation.      ?Anti-phospholipid syndrome  -     CBC Auto Differential; Future; Expected date: 04/18/2022  -     CBC Auto Differential; Future; Expected date: 04/18/2022  -     Comprehensive Metabolic Panel; Future; Expected date: 04/18/2022  -     Comprehensive Metabolic Panel; Future; Expected date: 04/18/2022  -     Cardiolipin antibody; Future; Expected date: 04/18/2022  -     Cardiolipin antibody, IgA; Future; Expected date: 04/18/2022  -     Antithrombin III; Future; Expected date: 04/18/2022  -     Protein C activity; Future; Expected date: 04/18/2022  -     Protein S activity; Future; Expected date: 04/18/2022  -     DRVVT; Future; Expected date: 04/18/2022  -     Homocysteine, serum; Future; Expected date: 04/18/2022  -     Factor 5 leiden; Future; Expected date: 04/18/2022  -     Prothrombin gene mutation; Future; Expected date: 04/18/2022  -     Beta-2 glycoprotein antibodies; Future; Expected date: 04/18/2022  -     Phosphatidylserine Ab (IgA,IgG,IgM); Future; Expected date: 04/18/2022    ?   Follow-Up: Follow up in about 2 weeks (around 5/2/2022).    VERA GUAJARDO Md., Ph.D  Hematology & Oncology Department  Phone #: [phone]

## 2022-04-18 NOTE — ASSESSMENT & PLAN NOTE
Advised that given presents of vascular thrombosis and lupus anticoagulant, will recommend long-term anticoagulation, preferably with vitamin K dependent anticoagulant such as Coumadin plus anti-platelet therapy such as low-dose aspirin.    Will plan to repeat hypercoagulable assessment prior to further recommendations. Regarding upcoming surgery, will recommend bridging with Lovenox.  Return in 2 weeks with repeat labs or sooner if needed.    Anti-phospholipid syndrome  Clinical criteria  1. Vascular thrombosis¶  One or more clinical episodesÄ of arterial, venous, or small vessel thrombosis?, in any tissue or organ. Thrombosis must be confirmed by objective validated criteria (ie, unequivocal findings of appropriate imaging studies or histopathology). For histopathologic confirmation, thrombosis should be present without significant evidence of inflammation in the vessel wall.  2. Pregnancy morbidity   1. One or more unexplained deaths of a morphologically normal fetus at or beyond the 10th week of gestation, with normal fetal morphology documented by ultrasound or by direct examination of the fetus; or  2. One or more premature births of a morphologically normal  before the 34th week of gestation because of: (i) eclampsia or severe preeclampsia defined according to standard definitions, or (ii) recognized features of placental insufficiency§; or  3. Three or more unexplained consecutive spontaneous abortions before the 10th week of gestation, with maternal anatomic or hormonal abnormalities and paternal and maternal chromosomal causes excluded.    Laboratory criteria  2. LA present in plasma, on 2 or more occasions at least 12 weeks apart, detected according to the guidelines of the International Society on Thrombosis and Haemostasis (Scientific Subcommittee on LAs/phospholipid-dependent antibodies).  3. aCL of IgG and/or IgM isotype in serum or plasma, present in medium or high titer (ie, >40 GPL or MPL,  or >the 99th percentile), on 2 or more occasions, at least 12 weeks apart, measured by a standardized WILLIAM.  3. Anti-beta2 glycoprotein I antibody of IgG and/or IgM isotype in serum or plasma (in titer >the 99th percentile), present on 2 or more occasions, at least 12 weeks apart, measured by a standardized WILLIAM, according to recommended procedures.

## 2022-04-20 LAB
HCYS SERPL-SCNC: 6.8 UMOL/L (ref 4–15.5)
PROT C ACT/NOR PPP CHRO: 145 % (ref 70–150)
PROT S ACT/NOR PPP: 140 % (ref 50–160)

## 2022-04-21 LAB
AT III ACT/NOR PPP CHRO: 113 % (ref 83–118)
B2 GLYCOPROT1 IGA SER QL: <9 SAU
B2 GLYCOPROT1 IGG SER QL: <9 SGU
B2 GLYCOPROT1 IGM SER QL: <9 SMU
CARDIOLIPIN IGA SER IA-ACNC: <9 APL

## 2022-04-22 LAB
APTT IMM NP PPP: ABNORMAL SEC (ref 32–48)
APTT P HEP NEUT PPP: ABNORMAL SEC (ref 32–48)
CONFIRM APTT STACLOT: ABNORMAL
DRVVT SCREEN TO CONFIRM RATIO: ABNORMAL RATIO
F2 C.20210G>A GENO BLD/T: NEGATIVE
F5 P.R506Q BLD/T QL: NEGATIVE
LA 3 SCREEN W REFLEX-IMP: ABNORMAL
LA NT DPL PPP QL: ABNORMAL
MIXING DRVVT: ABNORMAL SEC (ref 33–44)
PROTHROMBIN TIME: 12 SEC (ref 12–15.5)
REPTILASE TIME: ABNORMAL SEC
SCREEN APTT: 33 SEC (ref 32–48)
SCREEN DRVVT: 29 SEC (ref 33–44)
THROMBIN TIME: ABNORMAL SEC (ref 14.7–19.5)

## 2022-04-25 LAB
PS IGA SER-ACNC: <20 U/ML
PS IGG SER-ACNC: <10 U/ML
PS IGM SER-ACNC: <25 U/ML

## 2022-04-27 DIAGNOSIS — N95.1 HOT FLASHES DUE TO MENOPAUSE: ICD-10-CM

## 2022-04-27 DIAGNOSIS — G47.9 DISTURBANCE OF SLEEP: ICD-10-CM

## 2022-04-27 RX ORDER — VENLAFAXINE HYDROCHLORIDE 150 MG/1
150 CAPSULE, EXTENDED RELEASE ORAL DAILY
Qty: 90 CAPSULE | Refills: 3 | Status: SHIPPED | OUTPATIENT
Start: 2022-04-27 | End: 2022-09-13 | Stop reason: SDUPTHER

## 2022-05-06 LAB
CARDIOLIPIN IGG SER IA-ACNC: <9.4 GPL (ref 0–14.99)
CARDIOLIPIN IGM SER IA-ACNC: <9.4 MPL (ref 0–12.49)

## 2022-05-10 ENCOUNTER — PATIENT MESSAGE (OUTPATIENT)
Dept: ORTHOPEDICS | Facility: CLINIC | Age: 52
End: 2022-05-10
Payer: COMMERCIAL

## 2022-05-17 ENCOUNTER — HOSPITAL ENCOUNTER (OUTPATIENT)
Dept: PREADMISSION TESTING | Facility: HOSPITAL | Age: 52
Discharge: HOME | End: 2022-05-17
Attending: FAMILY MEDICINE
Payer: COMMERCIAL

## 2022-05-17 VITALS
SYSTOLIC BLOOD PRESSURE: 133 MMHG | HEART RATE: 84 BPM | DIASTOLIC BLOOD PRESSURE: 70 MMHG | TEMPERATURE: 98 F | RESPIRATION RATE: 16 BRPM | OXYGEN SATURATION: 96 %

## 2022-05-17 DIAGNOSIS — K21.9 GASTROESOPHAGEAL REFLUX DISEASE, UNSPECIFIED WHETHER ESOPHAGITIS PRESENT: ICD-10-CM

## 2022-05-17 DIAGNOSIS — S83.242D ACUTE MEDIAL MENISCUS TEAR OF LEFT KNEE, SUBSEQUENT ENCOUNTER: Primary | ICD-10-CM

## 2022-05-17 DIAGNOSIS — S83.242D ACUTE MEDIAL MENISCUS TEAR OF LEFT KNEE, SUBSEQUENT ENCOUNTER: ICD-10-CM

## 2022-05-17 DIAGNOSIS — I10 HYPERTENSION, UNSPECIFIED TYPE: ICD-10-CM

## 2022-05-17 DIAGNOSIS — T81.718D IATROGENIC PULMONARY EMBOLISM AND INFARCTION, SUBSEQUENT ENCOUNTER: ICD-10-CM

## 2022-05-17 DIAGNOSIS — D68.61 ANTI-PHOSPHOLIPID SYNDROME: ICD-10-CM

## 2022-05-17 DIAGNOSIS — F32.A DEPRESSION, UNSPECIFIED DEPRESSION TYPE: ICD-10-CM

## 2022-05-17 DIAGNOSIS — I26.99 IATROGENIC PULMONARY EMBOLISM AND INFARCTION, SUBSEQUENT ENCOUNTER: ICD-10-CM

## 2022-05-17 PROBLEM — S83.242A ACUTE MEDIAL MENISCUS TEAR OF LEFT KNEE: Status: ACTIVE | Noted: 2022-05-17

## 2022-05-17 NOTE — ASSESSMENT & PLAN NOTE
- Followed outpatient by Dr. Pinto.  - Per [facility], regarding upcoming surgery, will recommend bridging with Lovenox.  -

## 2022-05-17 NOTE — ASSESSMENT & PLAN NOTE
- Patient reports h/o PE 20 years ago while on birth control (took Coumadin for 13 years, but has not taken recently).   - Recently evaluated by Dr. Pinto with [facility] given concern for antiphospholipid syndrome/lupus anticoagulant disorder; Per [facility], following hypercoagulable assessment, we did not detect lupus anticoagulant or gene mutation. She is cleared to proceed with surgery. Recommend following standard post surgery anticoagulation.

## 2022-05-17 NOTE — ASSESSMENT & PLAN NOTE
- Patient presents today at the request of Dr. Dempsey who plans on performing a Left knee arthroscopy with medial menisectomy vs repair and any indicated procedures on 5/27.    Known risk factors for perioperative complications: None    Difficulty with intubation is not anticipated.    Cardiac Risk Estimation: Based on the Revised Cardiac Risk index, patient is a Class 1 risk with a 3.9% risk of a major cardiac event in a low risk procedure.    1.) Preoperative workup as follows: ECG, hemoglobin, hematocrit, electrolytes, creatinine, glucose, liver function studies.  2.) Change in medication regimen before surgery: discontinue ASA 5 days before surgery, discontinue NSAIDs 5 days before surgery.  3.) Prophylaxis for cardiac events with perioperative beta-blockers: Continue Toprol XL.  4.) Invasive hemodynamic monitoring perioperatively: not indicated.  5.) Deep vein thrombosis prophylaxis postoperatively: Will defer to Dr. Dempsey.  6.) Surveillance for postoperative MI with ECG immediately postoperatively and on postoperati ve days 1 and 2 AND troponin levels 24 hours postoperatively and on day 4 or hospital discharge (whichever comes first): not indicated.  7.) Current medications which may produce withdrawal symptoms if withheld perioperatively: None.  8.) Other measures: None.

## 2022-05-17 NOTE — H&P
Preoperative History and Physical  [facility]                                                                   Chief Complaint: Preoperative evaluation     History of Present Illness:      Mable Redding is a 51 y.o. female with a PMH of Anxiety/Depression, HTN, GERD, PE 20 years ago while on birth control (took Coumadin for 13 years, but has not taken recently), Hypothyroidism who presents to the office today for a preoperative consultation at the request of Dr. Dempsey who plans on performing a Left knee arthroscopy on May 27.     Functional Status:      The patient is not able to climb a flight of stairs due to left knee pain. The patient is able to ambulate without difficulty. The patient's functional status is affected by the surgical problem. The patient's functional status is not affected by shortness of breath, chest pain, dyspnea on exertion and fatigue.      MET score greater than 4    Past Medical History:      Past Medical History:   Diagnosis Date    Anxiety     Bursitis     Right hip    COVID-19 11/3/2021    Depression     Digestive disorder     Herpes genitalia     History of blood clots     HPV (human papilloma virus) infection     HTN (hypertension) 2022    Lupus anticoagulant disorder     Lupus anticoagulant disorder 2019    Pulmonary embolus     Thyroid disease         Past Surgical History:      Past Surgical History:   Procedure Laterality Date    ADENOIDECTOMY       SECTION      DILATION AND CURETTAGE OF UTERUS      HYSTERECTOMY  2017    HYSTEROSCOPY      LIPOMA RESECTION      ROBOTIC HYSTERECTOMY, WITH SALPINGECTOMY Bilateral     TONSILLECTOMY          Social History:      Social History     Socioeconomic History    Marital status:    Tobacco Use    Smoking status: Former Smoker     Packs/day: 0.25     Years: 10.00     Pack years: 2.50     Quit date:      Years since quittin.3     Smokeless tobacco: Never Used   Substance and Sexual Activity    Alcohol use: Yes     Comment: Socially    Drug use: No    Sexual activity: Yes     Partners: Male     Birth control/protection: See Surgical Hx        Family History:      Family History   Problem Relation Age of Onset    Hyperlipidemia Mother     Coronary artery disease Father     Hyperlipidemia Father     Hypertension Father     No Known Problems Sister     No Known Problems Maternal Aunt     No Known Problems Maternal Uncle     No Known Problems Paternal Aunt     No Known Problems Paternal Uncle     No Known Problems Maternal Grandmother     Diabetes type II Maternal Grandfather     Hypertension Paternal Grandmother     Hyperlipidemia Paternal Grandmother     Heart disease Paternal Grandmother     Lung cancer Paternal Grandmother     Lung cancer Paternal Grandfather     Amblyopia Neg Hx     Blindness Neg Hx     Cancer Neg Hx     Cataracts Neg Hx     Diabetes Neg Hx     Glaucoma Neg Hx     Macular degeneration Neg Hx     Retinal detachment Neg Hx     Strabismus Neg Hx     Stroke Neg Hx     Thyroid disease Neg Hx        Allergies:      Review of patient's allergies indicates:   Allergen Reactions    Aldactone [spironolactone] Swelling    Remdesivir Hives    Sulfa (sulfonamide antibiotics) Edema       Medications:      Current Outpatient Medications   Medication Sig    alprazolam (XANAX) 0.25 MG tablet Take 0.25 mg by mouth nightly as needed.    aspirin (ECOTRIN) 81 MG EC tablet Take 81 mg by mouth once daily.    docusate sodium (COLACE) 100 MG capsule 300 mg.     ezetimibe (ZETIA) 10 mg tablet Take 10 mg by mouth every evening.    finasteride (PROSCAR) 5 mg tablet Take 5 mg by mouth every evening.    loratadine (CLARITIN) 10 mg tablet Take 10 mg by mouth daily as needed.    losartan (COZAAR) 100 MG tablet Take 100 mg by mouth nightly.    metoprolol succinate (TOPROL-XL) 25 MG 24 hr tablet Take 1 tablet (25 mg  total) by mouth once daily. (Patient taking differently: Take 25 mg by mouth every evening.)    NP THYROID 90 mg Tab 150 mg before breakfast.    pantoprazole (PROTONIX) 40 MG tablet Take 40 mg by mouth every evening.    rosuvastatin (CRESTOR) 40 MG Tab 20 mg every evening.    venlafaxine (EFFEXOR XR) 150 MG Cp24 Take 1 capsule (150 mg total) by mouth once daily. (Patient taking differently: Take 150 mg by mouth every evening.)    vitamin D 1000 units Tab Take 5,000 Units by mouth once daily.     clobetasoL (CLOBEX) 0.05 % shampoo APPLY TOPICALLY EVERY DAY (Patient not taking: No sig reported)    clotrimazole-betamethasone 1-0.05% (LOTRISONE) cream APPLY CREAM TO AFFECTED AREA TWICE DAILY    fluticasone propionate (FLONASE) 50 mcg/actuation nasal spray Use 1 spray (50 mcg total) by Each Nostril route once daily. (Patient not taking: Reported on 2022)    hydroCHLOROthiazide (HYDRODIURIL) 25 MG tablet TAKE 1 TABLET(25 MG) BY MOUTH EVERY DAY    HYDROcodone-acetaminophen (NORCO)  mg per tablet Take 1 tablet by mouth every 4 (four) hours as needed for Pain.    hydrocortisone (WESTCORT) 0.2 % cream PERCY EXT AA BID    hydrOXYchloroQUINE (PLAQUENIL) 200 mg tablet Take by mouth.    ivermectin (STROMECTOL) 3 mg Tab SMARTSI Tablet(s) By Mouth Once    mupirocin (BACTROBAN) 2 % ointment Apply to nostrils 2 (two) times daily.    ondansetron (ZOFRAN) 4 MG tablet Take 1 tablet (4 mg total) by mouth every 6 (six) hours.    ondansetron (ZOFRAN-ODT) 4 MG TbDL Take 1 tablet (4 mg total) by mouth 2 (two) times daily.    promethazine (PHENERGAN) 25 MG tablet TAKE ONE TABLET BY MOUTH EVERY FOUR HOURS AS NEEDED FOR NAUSEA     Current Facility-Administered Medications   Medication    acetaminophen tablet 650 mg    albuterol inhaler 2 puff    diphenhydrAMINE injection 25 mg    EPINEPHrine (EPIPEN) 0.3 mg/0.3 mL pen injection 0.3 mg    methylPREDNISolone sodium succinate injection 40 mg    ondansetron  "disintegrating tablet 4 mg    sodium chloride 0.9% 500 mL flush bag    sodium chloride 0.9% flush 10 mL       Vitals:      Vitals:    05/17/22 1413   BP: 133/70   Pulse: 84   Resp: 16   Temp: 97.7 °F (36.5 °C)       Review of Systems:        Constitutional: Negative for fever, chills, weight loss, malaise/fatigue and diaphoresis.   HENT: Negative for hearing loss, ear pain, nosebleeds, congestion, sore throat, neck pain, tinnitus and ear discharge.    Eyes: Negative for blurred vision, double vision, photophobia, pain, discharge and redness.   Respiratory: Negative for cough, hemoptysis, sputum production, shortness of breath, wheezing and stridor.    Cardiovascular: Negative for chest pain, palpitations, orthopnea, claudication, leg swelling and PND.   Gastrointestinal: Negative for heartburn, nausea, vomiting, abdominal pain, diarrhea, constipation, blood in stool and melena.   Genitourinary: Negative for dysuria, urgency, frequency, hematuria and flank pain.   Musculoskeletal: Negative for myalgias, back pain, and falls. Positive for left knee pain, rates 10/10 at worst, associated with limited ROM, and "popping/clicking" sensation.  Skin: Negative for itching and rash.   Neurological: Negative for dizziness, tingling, tremors, sensory change, speech change, focal weakness, seizures, loss of consciousness, weakness and headaches.   Endo/Heme/Allergies: Negative for environmental allergies and polydipsia. Does not bruise/bleed easily.   Psychiatric/Behavioral: Negative for depression, suicidal ideas, hallucinations, memory loss and substance abuse. The patient is not nervous/anxious and does not have insomnia.    All 14 systems reviewed and negative except as noted above.    Physical Exam:      Constitutional: Appears well-developed, well-nourished and in no acute distress.  Patient is oriented to person, place, and time.   Head: Normocephalic and atraumatic. Mucous membranes moist.  Neck: Neck supple no mass. "   Cardiovascular: Normal rate and regular rhythm.  S1 S2 appreciated by ascultation.  Pulmonary/Chest: Effort normal and clear to auscultation bilaterally. No respiratory distress.   Abdomen: Soft. Non-tender and non-distended. Bowel sounds are normal.   Neurological: Patient is alert and oriented to person, place and time. Moves all extremities.  Skin: Warm and dry. No lesions.  Extremities: No clubbing, cyanosis or edema.    Laboratory data:      Reviewed and noted in plan where applicable. Please see chart for full laboratory data.    No results for input(s): CPK, CPKMB, TROPONINI, MB in the last 24 hours. No results for input(s): POCTGLUCOSE in the last 24 hours.     Lab Results   Component Value Date    INR 0.9 04/11/2022    INR 0.9 02/10/2019    INR 3.3 (H) 05/13/2013       Lab Results   Component Value Date    WBC 7.21 04/18/2022    HGB 14.2 04/18/2022    HCT 42.5 04/18/2022    MCV 81 (L) 04/18/2022     04/18/2022       No results for input(s): GLU, NA, K, CL, CO2, BUN, CREATININE, CALCIUM, MG in the last 24 hours.    Predictors of intubation difficulty:       Morbid obesity? yes    Anatomically abnormal facies? no   Prominent incisors? no   Receding mandible? no   Short, thick neck? yes    Neck range of motion: normal   Dentition: No chipped, loose, or missing teeth.  Based on the Modified Mallampati, patient is a mallampati score: I (soft palate, uvula, fauces, and tonsillar pillars visible).    Cardiographics:      ECG: Normal sinus rhythm  Possible Left atrial enlargement  T wave abnormality, consider inferior ischemia  Abnormal ECG  When compared with ECG of 13-OCT-2020 13:26,  No significant change was found  Confirmed by RAGHAV OLIVAS MD ([id number]) on 11/3/2021 11:04:05 PM    Echocardiogram in 2021 showed;     · The left ventricle is normal in size with concentric remodeling and normal systolic function.  · Normal left ventricular diastolic function.  · The estimated PA systolic pressure is 21  mmHg.  · Normal right ventricular size with normal right ventricular systolic function.  · Normal central venous pressure (3 mmHg).  · The estimated ejection fraction is 60%.  · Mild tricuspid regurgitation.      Imaging:      Chest x-ray: Not indicated.    Assessment and Plan:      Acute medial meniscus tear of left knee  - Patient presents today at the request of Dr. Dempsey who plans on performing a Left knee arthroscopy with medial menisectomy vs repair and any indicated procedures on 5/27.    Known risk factors for perioperative complications: None    Difficulty with intubation is not anticipated.    Cardiac Risk Estimation: Based on the Revised Cardiac Risk index, patient is a Class 1 risk with a 3.9% risk of a major cardiac event in a low risk procedure.    1.) Preoperative workup as follows: ECG, hemoglobin, hematocrit, electrolytes, creatinine, glucose, liver function studies.  2.) Change in medication regimen before surgery: discontinue ASA 5 days before surgery, discontinue NSAIDs 5 days before surgery.  3.) Prophylaxis for cardiac events with perioperative beta-blockers: Continue Toprol XL.  4.) Invasive hemodynamic monitoring perioperatively: not indicated.  5.) Deep vein thrombosis prophylaxis postoperatively: Will defer to Dr. Dempsey.  6.) Surveillance for postoperative MI with ECG immediately postoperatively and on postoperati ve days 1 and 2 AND troponin levels 24 hours postoperatively and on day 4 or hospital discharge (whichever comes first): not indicated.  7.) Current medications which may produce withdrawal symptoms if withheld perioperatively: None.  8.) Other measures: None.      H/O Iatrogenic pulmonary embolus and infarction  - Patient reports h/o PE 20 years ago while on birth control (took Coumadin for 13 years, but has not taken recently).   - Recently evaluated by Dr. Pinto with [facility] given concern for antiphospholipid syndrome/lupus anticoagulant disorder; Per [facility], following  hypercoagulable assessment, we did not detect lupus anticoagulant or gene mutation. She is cleared to proceed with surgery. Recommend following standard post surgery anticoagulation.     Depression  - Continue home Effexor.     GERD (gastroesophageal reflux disease)  - Continue PPI.    HTN (hypertension)  - BP well controlled.   - Continue home Toprol XL and Losartan with instructions to hold home Losartan the night before surgery given h/o hypotension associated with anesthesia.          Electronically signed by Sherrill Torres DNP, REJI on 5/18/2022 at 2:24 PM.

## 2022-05-17 NOTE — DISCHARGE INSTRUCTIONS
To confirm, Your doctor has instructed you that surgery is scheduled for 5/27/22.       Please report to [facility].      Pre admit office will call afternoon prior to surgery with final arrival time.    INSTRUCTIONS IMPORTANT!!!     Do not eat, drink, or smoke after 12 midnight-including water. OK to brush teeth, no gum, candy or mints!    ¨ Take only these medicines with a small swallow of water-morning of surgery.    NP Thyroid    Pre operative instructions:  Please review the Pre-Operative Instruction booklet that you were given.        Bathing Instructions--See page 6 in the Pre-operative booklet.      Prevention of surgical site infections:     -Keep incisions clean and dry.   -Do not soak/submerge incisions in water until completely healed.   -Do not apply lotions, powders, creams, or deodorants to site.   -Always make sure hands are cleaned with antibacterial soap/ alcohol-based  prior to touching the surgical site.  (This includes doctors, nurses, staff, and yourself.)    Signs and symptoms:   -Redness and pain around the area where you had surgery   -Drainage of cloudy fluid from your surgical wound   -Fever over 100.4       I have read or had read and explained to me, and understand the above information.  Additional comments or instructions:  Received a copy of Pre-operative instructions booklet, FAQ surgical site infection sheet, and packets of hibiclens (if indicated).

## 2022-05-17 NOTE — ASSESSMENT & PLAN NOTE
- BP well controlled.   - Continue home Toprol XL and Losartan with instructions to hold home Losartan the night before surgery given h/o hypotension associated with anesthesia.

## 2022-05-22 RX ORDER — CYCLOBENZAPRINE HCL 10 MG
10 TABLET ORAL 3 TIMES DAILY PRN
Qty: 30 TABLET | Refills: 1 | Status: SHIPPED | OUTPATIENT
Start: 2022-05-22 | End: 2022-06-01

## 2022-05-23 ENCOUNTER — PATIENT MESSAGE (OUTPATIENT)
Dept: ORTHOPEDICS | Facility: CLINIC | Age: 52
End: 2022-05-23
Payer: COMMERCIAL

## 2022-05-25 ENCOUNTER — ANESTHESIA EVENT (OUTPATIENT)
Dept: SURGERY | Facility: HOSPITAL | Age: 52
End: 2022-05-25
Payer: COMMERCIAL

## 2022-05-25 ENCOUNTER — PATIENT MESSAGE (OUTPATIENT)
Dept: ORTHOPEDICS | Facility: CLINIC | Age: 52
End: 2022-05-25
Payer: COMMERCIAL

## 2022-05-25 NOTE — ANESTHESIA PREPROCEDURE EVALUATION
2022   Past Medical History:   Diagnosis Date    Anxiety     Bursitis     Right hip    COVID-19 11/3/2021    Depression     Digestive disorder     Herpes genitalia     History of blood clots     HPV (human papilloma virus) infection     HTN (hypertension) 2022    Lupus anticoagulant disorder     Lupus anticoagulant disorder 2019    Pulmonary embolus     Thyroid disease        Mable Redding is a 51 y.o., female.  Past Surgical History:   Procedure Laterality Date    ADENOIDECTOMY       SECTION      DILATION AND CURETTAGE OF UTERUS      HYSTERECTOMY  2017    HYSTEROSCOPY      LIPOMA RESECTION      ROBOTIC HYSTERECTOMY, WITH SALPINGECTOMY Bilateral     TONSILLECTOMY             Pre-op Assessment    I have reviewed the Patient Summary Reports.     I have reviewed the Nursing Notes. I have reviewed the NPO Status.   I have reviewed the Medications.     Review of Systems  Anesthesia Hx:  No problems with previous Anesthesia  Denies Family Hx of Anesthesia complications.   Denies Personal Hx of Anesthesia complications.   Social:  Non-Smoker    Hematology/Oncology:  Hematology Normal      Hematology Comments: H/O remote PE due to OC's, now of OAC's.   Cardiovascular:   Hypertension Had recent episodes of palpitations, w/u NEG, NL echo.    Pulmonary:  Pulmonary Normal    Renal/:  Renal/ Normal     Hepatic/GI:   GERD    Neurological:  Neurology Normal    Endocrine:  Obesity / BMI > 30  Psych:   depression          Physical Exam  General: Alert and Oriented    Airway:  Mallampati: II   Mouth Opening: Small, but > 3cm  TM Distance: Normal  Tongue: Normal  Neck ROM: Normal ROM    Dental:  Intact    Chest/Lungs:  Clear to auscultation, Normal Respiratory Rate    Heart:  Rate: Normal  Rhythm: Regular Rhythm        Anesthesia Plan  Type of Anesthesia, risks & benefits  discussed:    Anesthesia Type: Gen Supraglottic Airway  Intra-op Monitoring Plan: Standard ASA Monitors  Post Op Pain Control Plan: multimodal analgesia and IV/PO Opioids PRN  Induction:  IV  Informed Consent: Informed consent signed with the Patient and all parties understand the risks and agree with anesthesia plan.  All questions answered.   ASA Score: 2  Day of Surgery Review of History & Physical: H&P Update referred to the surgeon/provider.    Ready For Surgery From Anesthesia Perspective.     .

## 2022-05-26 ENCOUNTER — TELEPHONE (OUTPATIENT)
Dept: PREADMISSION TESTING | Facility: HOSPITAL | Age: 52
End: 2022-05-26
Payer: COMMERCIAL

## 2022-05-26 NOTE — TELEPHONE ENCOUNTER
Called and spoke with the patient about the following:    Your Surgery arrival time is at 5:30 AM on 5/27/2022 at [facility] location.    The address is [id number] [place]  [address].     Only one adult (over 18) is to accompany you to surgery, unless it is a Pediatric patient, then 2 adults are encouraged to accompany them to the surgery center.    Your ride MUST STAY the entire time until you are discharged.      Please come in the main lobby (located on the 1st floor).     Be prepared to show your photo ID and insurance card.     Masks should be worn by ALL persons entering the building.     Nothing to eat or drink after after midnight, unless you were instructed to take specific medications discussed with the Pre-admit Nurse.     Please call [phone] with any questions or concerns.     Thanks.

## 2022-05-27 ENCOUNTER — HOSPITAL ENCOUNTER (OUTPATIENT)
Facility: HOSPITAL | Age: 52
Discharge: HOME | End: 2022-05-27
Attending: ORTHOPAEDIC SURGERY | Admitting: ORTHOPAEDIC SURGERY
Payer: COMMERCIAL

## 2022-05-27 ENCOUNTER — TELEPHONE (OUTPATIENT)
Dept: ORTHOPEDICS | Facility: CLINIC | Age: 52
End: 2022-05-27
Payer: COMMERCIAL

## 2022-05-27 ENCOUNTER — ANESTHESIA (OUTPATIENT)
Dept: SURGERY | Facility: HOSPITAL | Age: 52
End: 2022-05-27
Payer: COMMERCIAL

## 2022-05-27 VITALS
WEIGHT: 213.06 LBS | HEART RATE: 80 BPM | BODY MASS INDEX: 34.24 KG/M2 | DIASTOLIC BLOOD PRESSURE: 70 MMHG | SYSTOLIC BLOOD PRESSURE: 122 MMHG | OXYGEN SATURATION: 100 % | TEMPERATURE: 98 F | RESPIRATION RATE: 20 BRPM | HEIGHT: 66 IN

## 2022-05-27 DIAGNOSIS — S83.242D ACUTE MEDIAL MENISCUS TEAR OF LEFT KNEE, SUBSEQUENT ENCOUNTER: Primary | ICD-10-CM

## 2022-05-27 PROCEDURE — 36000710: Performed by: ORTHOPAEDIC SURGERY

## 2022-05-27 PROCEDURE — 37000008 HC ANESTHESIA 1ST 15 MINUTES: Performed by: ORTHOPAEDIC SURGERY

## 2022-05-27 PROCEDURE — 71000015 HC POSTOP RECOV 1ST HR: Performed by: ORTHOPAEDIC SURGERY

## 2022-05-27 PROCEDURE — D9220A PRA ANESTHESIA: ICD-10-PCS | Mod: CRNA,,, | Performed by: NURSE ANESTHETIST, CERTIFIED REGISTERED

## 2022-05-27 PROCEDURE — 29880 PR KNEE SCOPE MED/LAT MENISCECTOMY: ICD-10-PCS | Mod: LT,,, | Performed by: ORTHOPAEDIC SURGERY

## 2022-05-27 PROCEDURE — 27200651 HC AIRWAY, LMA: Performed by: ANESTHESIOLOGY

## 2022-05-27 PROCEDURE — 29880 ARTHRS KNE SRG MNISECTMY M&L: CPT | Mod: LT,,, | Performed by: ORTHOPAEDIC SURGERY

## 2022-05-27 PROCEDURE — 27201423 OPTIME MED/SURG SUP & DEVICES STERILE SUPPLY: Performed by: ORTHOPAEDIC SURGERY

## 2022-05-27 PROCEDURE — D9220A PRA ANESTHESIA: Mod: CRNA,,, | Performed by: NURSE ANESTHETIST, CERTIFIED REGISTERED

## 2022-05-27 PROCEDURE — D9220A PRA ANESTHESIA: Mod: ANES,,, | Performed by: ANESTHESIOLOGY

## 2022-05-27 PROCEDURE — 25000003 PHARM REV CODE 250: Performed by: PHYSICIAN ASSISTANT

## 2022-05-27 PROCEDURE — 71000016 HC POSTOP RECOV ADDL HR: Performed by: ORTHOPAEDIC SURGERY

## 2022-05-27 PROCEDURE — 36000711: Performed by: ORTHOPAEDIC SURGERY

## 2022-05-27 PROCEDURE — 63600175 PHARM REV CODE 636 W HCPCS: Performed by: PHYSICIAN ASSISTANT

## 2022-05-27 PROCEDURE — 63600175 PHARM REV CODE 636 W HCPCS: Performed by: NURSE ANESTHETIST, CERTIFIED REGISTERED

## 2022-05-27 PROCEDURE — 37000009 HC ANESTHESIA EA ADD 15 MINS: Performed by: ORTHOPAEDIC SURGERY

## 2022-05-27 PROCEDURE — 63600175 PHARM REV CODE 636 W HCPCS: Performed by: ANESTHESIOLOGY

## 2022-05-27 PROCEDURE — 71000033 HC RECOVERY, INTIAL HOUR: Performed by: ORTHOPAEDIC SURGERY

## 2022-05-27 PROCEDURE — D9220A PRA ANESTHESIA: ICD-10-PCS | Mod: ANES,,, | Performed by: ANESTHESIOLOGY

## 2022-05-27 RX ORDER — SODIUM CHLORIDE 9 MG/ML
INJECTION, SOLUTION INTRAVENOUS CONTINUOUS
Status: DISCONTINUED | OUTPATIENT
Start: 2022-05-27 | End: 2022-05-27 | Stop reason: HOSPADM

## 2022-05-27 RX ORDER — CHLORHEXIDINE GLUCONATE ORAL RINSE 1.2 MG/ML
10 SOLUTION DENTAL 2 TIMES DAILY
Status: DISCONTINUED | OUTPATIENT
Start: 2022-05-27 | End: 2022-05-27 | Stop reason: HOSPADM

## 2022-05-27 RX ORDER — LIDOCAINE HCL/PF 100 MG/5ML
SYRINGE (ML) INTRAVENOUS
Status: DISCONTINUED | OUTPATIENT
Start: 2022-05-27 | End: 2022-05-27

## 2022-05-27 RX ORDER — CHLORHEXIDINE GLUCONATE ORAL RINSE 1.2 MG/ML
10 SOLUTION DENTAL
Status: DISCONTINUED | OUTPATIENT
Start: 2022-05-27 | End: 2022-05-27 | Stop reason: HOSPADM

## 2022-05-27 RX ORDER — DEXAMETHASONE SODIUM PHOSPHATE 4 MG/ML
INJECTION, SOLUTION INTRA-ARTICULAR; INTRALESIONAL; INTRAMUSCULAR; INTRAVENOUS; SOFT TISSUE
Status: DISCONTINUED | OUTPATIENT
Start: 2022-05-27 | End: 2022-05-27

## 2022-05-27 RX ORDER — ACETAMINOPHEN 10 MG/ML
INJECTION, SOLUTION INTRAVENOUS
Status: DISCONTINUED | OUTPATIENT
Start: 2022-05-27 | End: 2022-05-27

## 2022-05-27 RX ORDER — HYDROCODONE BITARTRATE AND ACETAMINOPHEN 5; 325 MG/1; MG/1
1 TABLET ORAL
Qty: 30 TABLET | Refills: 0 | Status: SHIPPED | OUTPATIENT
Start: 2022-05-27 | End: 2023-01-19

## 2022-05-27 RX ORDER — SODIUM CHLORIDE, SODIUM LACTATE, POTASSIUM CHLORIDE, CALCIUM CHLORIDE 600; 310; 30; 20 MG/100ML; MG/100ML; MG/100ML; MG/100ML
INJECTION, SOLUTION INTRAVENOUS CONTINUOUS
Status: DISCONTINUED | OUTPATIENT
Start: 2022-05-27 | End: 2022-05-27 | Stop reason: HOSPADM

## 2022-05-27 RX ORDER — FENTANYL CITRATE 50 UG/ML
INJECTION, SOLUTION INTRAMUSCULAR; INTRAVENOUS
Status: DISCONTINUED | OUTPATIENT
Start: 2022-05-27 | End: 2022-05-27

## 2022-05-27 RX ORDER — DOCUSATE SODIUM 100 MG/1
100 CAPSULE, LIQUID FILLED ORAL 2 TIMES DAILY
Qty: 30 CAPSULE | Refills: 0 | Status: SHIPPED | OUTPATIENT
Start: 2022-05-27 | End: 2023-03-28

## 2022-05-27 RX ORDER — CEFAZOLIN SODIUM 2 G/50ML
2 SOLUTION INTRAVENOUS
Status: COMPLETED | OUTPATIENT
Start: 2022-05-27 | End: 2022-05-27

## 2022-05-27 RX ORDER — BUPIVACAINE HYDROCHLORIDE 5 MG/ML
INJECTION, SOLUTION EPIDURAL; INTRACAUDAL
Status: DISCONTINUED
Start: 2022-05-27 | End: 2022-05-27 | Stop reason: HOSPADM

## 2022-05-27 RX ORDER — PROPOFOL 10 MG/ML
INJECTION, EMULSION INTRAVENOUS
Status: DISCONTINUED | OUTPATIENT
Start: 2022-05-27 | End: 2022-05-27

## 2022-05-27 RX ORDER — ENOXAPARIN SODIUM 100 MG/ML
30 INJECTION SUBCUTANEOUS DAILY
Qty: 6.3 ML | Refills: 0 | Status: SHIPPED | OUTPATIENT
Start: 2022-05-28 | End: 2022-06-18

## 2022-05-27 RX ORDER — LIDOCAINE HYDROCHLORIDE 10 MG/ML
INJECTION, SOLUTION EPIDURAL; INFILTRATION; INTRACAUDAL; PERINEURAL
Status: DISCONTINUED
Start: 2022-05-27 | End: 2022-05-27 | Stop reason: HOSPADM

## 2022-05-27 RX ORDER — HYDROCODONE BITARTRATE AND ACETAMINOPHEN 5; 325 MG/1; MG/1
1 TABLET ORAL EVERY 4 HOURS PRN
Status: DISCONTINUED | OUTPATIENT
Start: 2022-05-27 | End: 2022-05-27 | Stop reason: HOSPADM

## 2022-05-27 RX ORDER — KETAMINE HYDROCHLORIDE 50 MG/ML
INJECTION, SOLUTION INTRAMUSCULAR; INTRAVENOUS
Status: DISCONTINUED | OUTPATIENT
Start: 2022-05-27 | End: 2022-05-27

## 2022-05-27 RX ORDER — ONDANSETRON 2 MG/ML
INJECTION INTRAMUSCULAR; INTRAVENOUS
Status: DISCONTINUED | OUTPATIENT
Start: 2022-05-27 | End: 2022-05-27

## 2022-05-27 RX ORDER — ONDANSETRON 4 MG/1
4 TABLET, FILM COATED ORAL EVERY 8 HOURS PRN
Qty: 30 TABLET | Refills: 0 | Status: SHIPPED | OUTPATIENT
Start: 2022-05-27 | End: 2023-03-28

## 2022-05-27 RX ORDER — MIDAZOLAM HYDROCHLORIDE 1 MG/ML
INJECTION INTRAMUSCULAR; INTRAVENOUS
Status: DISCONTINUED | OUTPATIENT
Start: 2022-05-27 | End: 2022-05-27

## 2022-05-27 RX ORDER — EPINEPHRINE 1 MG/ML
INJECTION, SOLUTION INTRACARDIAC; INTRAMUSCULAR; INTRAVENOUS; SUBCUTANEOUS
Status: DISCONTINUED
Start: 2022-05-27 | End: 2022-05-27 | Stop reason: HOSPADM

## 2022-05-27 RX ADMIN — SODIUM CHLORIDE, SODIUM LACTATE, POTASSIUM CHLORIDE, AND CALCIUM CHLORIDE: .6; .31; .03; .02 INJECTION, SOLUTION INTRAVENOUS at 06:05

## 2022-05-27 RX ADMIN — ACETAMINOPHEN 1000 MG: 10 INJECTION, SOLUTION INTRAVENOUS at 07:05

## 2022-05-27 RX ADMIN — Medication 50 MG: at 07:05

## 2022-05-27 RX ADMIN — DEXAMETHASONE SODIUM PHOSPHATE 8 MG: 4 INJECTION, SOLUTION INTRA-ARTICULAR; INTRALESIONAL; INTRAMUSCULAR; INTRAVENOUS; SOFT TISSUE at 07:05

## 2022-05-27 RX ADMIN — HYDROCODONE BITARTRATE AND ACETAMINOPHEN 1 TABLET: 5; 325 TABLET ORAL at 09:05

## 2022-05-27 RX ADMIN — ONDANSETRON 4 MG: 2 INJECTION, SOLUTION INTRAMUSCULAR; INTRAVENOUS at 07:05

## 2022-05-27 RX ADMIN — MIDAZOLAM HYDROCHLORIDE 2 MG: 1 INJECTION INTRAMUSCULAR; INTRAVENOUS at 07:05

## 2022-05-27 RX ADMIN — FENTANYL CITRATE 50 MCG: 50 INJECTION, SOLUTION INTRAMUSCULAR; INTRAVENOUS at 07:05

## 2022-05-27 RX ADMIN — CHLORHEXIDINE GLUCONATE 0.12% ORAL RINSE 10 ML: 1.2 LIQUID ORAL at 06:05

## 2022-05-27 RX ADMIN — KETAMINE HYDROCHLORIDE 20 MG: 50 INJECTION, SOLUTION INTRAMUSCULAR; INTRAVENOUS at 07:05

## 2022-05-27 RX ADMIN — CEFAZOLIN SODIUM 2 G: 2 SOLUTION INTRAVENOUS at 07:05

## 2022-05-27 RX ADMIN — PROPOFOL 200 MG: 10 INJECTION, EMULSION INTRAVENOUS at 07:05

## 2022-05-27 NOTE — H&P
[facility]  Orthopedics  H&P    Patient Name: Mable Redding  MRN: [id number]  Admission Date: 2022  Primary Care Provider: Casa Reeder Jr, MD    Patient information was obtained from ER records.     Subjective:     Principal Problem: Left knee Pain    Chief Complaint: Left knee Pain      HPI:   Mable Redding is a 51 y.o. female presents today for outpatient surgery for continued left knee pain. She injured her knee in late 2021.  Patient states that she was jumping rope and she felt a pop. She was treated conservatively by Dr. Zenobia Perez with PT with a personal friend of hers.  She has not improved much with therapy.  She continues to have pain in the medial and anterior knee, especially when she extends her knee while walking.  She uses a knee brace.  She would like to discuss the role of surgery in her treatment algorithm.  Denies any fever, chills, night sweats, numbness and tingling.    Past Medical History:   Diagnosis Date    Anxiety     Bursitis     Right hip    COVID-19 11/3/2021    Depression     Digestive disorder     Herpes genitalia     History of blood clots     HPV (human papilloma virus) infection     HTN (hypertension) 2022    Lupus anticoagulant disorder     Lupus anticoagulant disorder 2019    Pulmonary embolus     Thyroid disease        Past Surgical History:   Procedure Laterality Date    ADENOIDECTOMY       SECTION      DILATION AND CURETTAGE OF UTERUS      HYSTERECTOMY  2017    HYSTEROSCOPY      LIPOMA RESECTION      OTHER SURGICAL HISTORY      Patient states she feel SOB in recovery and BP drops. Requests to be placed on side or sitting up.    ROBOTIC HYSTERECTOMY, WITH SALPINGECTOMY Bilateral     TONSILLECTOMY         Review of patient's allergies indicates:   Allergen Reactions    Aldactone [spironolactone] Swelling    Remdesivir Hives    Sulfa (sulfonamide antibiotics) Edema       Current  Facility-Administered Medications   Medication    0.9%  NaCl infusion    cefazolin (ANCEF) 2 gram in dextrose 5% 50 mL IVPB (premix)    chlorhexidine 0.12 % solution 10 mL    lactated ringers infusion     Family History     Problem Relation (Age of Onset)    Coronary artery disease Father    Diabetes type II Maternal Grandfather    Heart disease Paternal Grandmother    Hyperlipidemia Mother, Father, Paternal Grandmother    Hypertension Father, Paternal Grandmother    Lung cancer Paternal Grandmother, Paternal Grandfather    No Known Problems Sister, Maternal Aunt, Maternal Uncle, Paternal Aunt, Paternal Uncle, Maternal Grandmother        Tobacco Use    Smoking status: Former Smoker     Packs/day: 0.25     Years: 10.00     Pack years: 2.50     Quit date:      Years since quittin.4    Smokeless tobacco: Never Used   Substance and Sexual Activity    Alcohol use: Yes     Comment: Socially    Drug use: No    Sexual activity: Yes     Partners: Male     Birth control/protection: See Surgical Hx     Review of Systems   Unable to perform ROS  Constitutional: Negative for chills, fever, malaise/fatigue and night sweats.   HENT: Negative for congestion and sore throat.    Eyes: Negative for blurred vision and photophobia.   Cardiovascular: Negative for chest pain and claudication.   Respiratory: Negative for cough and shortness of breath.    Hematologic/Lymphatic: Does not bruise/bleed easily.   Skin: Negative for dry skin, itching, rash and suspicious lesions.   Musculoskeletal: Negative for back pain, joint pain, joint swelling, myalgias and neck pain.   Gastrointestinal: Negative for abdominal pain, constipation, nausea and vomiting.   Genitourinary: Negative for frequency and hematuria.   Neurological: Negative for dizziness, headaches, numbness and paresthesias.   Psychiatric/Behavioral: Negative for altered mental status.   All other systems reviewed and are negative.    Objective:     Vital Signs (Most  "Recent):  Temp: 97.4 °F (36.3 °C) (22)  Pulse: 72 (22)  Resp: 18 (22)  BP: 134/76 (22)  SpO2: 95 % (22) Vital Signs (24h Range):  Temp:  [97.4 °F (36.3 °C)] 97.4 °F (36.3 °C)  Pulse:  [72] 72  Resp:  [18] 18  SpO2:  [95 %] 95 %  BP: (134)/(76) 134/76     Weight: 96.6 kg (213 lb 1.2 oz)  Height: 5' 6" (167.6 cm)  Body mass index is 34.39 kg/m².    No intake or output data in the 24 hours ending 22    General    Nursing note and vitals reviewed.          Right Knee Exam     Other   Sensation: normal    Left Knee Exam     Other   Sensation: normal    Muscle Strength   Right Lower Extremity   Hip Abduction: 5/5   Quadriceps:  5/5   Hamstrin/5   Left Lower Extremity   Hip Abduction: 5/5   Quadriceps:  5/5   Hamstrin/5     Vascular Exam     Right Pulses  Dorsalis Pedis:      2+  Posterior Tibial:      2+        Left Pulses  Dorsalis Pedis:      2+  Posterior Tibial:      2+          All compartments are soft and compressible. Calf soft non-tender. Intact EHL, FHL, gastroc soleus, and tibialis anterior. Sensation intact to light touch in superficial peroneal, deep peroneal, tibial, sural, and saphenous nerve distributions. Foot warm and well perfused with capillary refill of less than 2 seconds and palpable pedal pulses.       Significant Labs: All pertinent labs within the past 24 hours have been reviewed.    Significant Imaging: I have reviewed and interpreted all pertinent imaging results/findings.    Assessment/Plan:     Plan:   · Left knee arthroscopy, medial meniscectomy versus repair, any indicated procedures    I had a long discussion with the patient about treatment options, including operative and nonoperative treatments. We discussed pros and cons of each including risks pertinent to surgery including pain, infection, bleeding, damage to adjacent structures like nerves and blood vessels, failure to heal, need for future surgeries, " stiffness, instability, loss of limb, anesthesia risks like stroke, blood clot, loss of life. We discussed the possibility of need for later hardware removal in the case that hardware was used. We discussed common and uncommon risks, and discussed patient specific factors that may increase the risks present with surgery. All questions were answered. The patient expressed understanding of the pros and cons of surgery and wanted to proceed with surgical treatment.    We discussed COVID19 with the patient, they are aware of our current policies and procedures, were given the option of delaying surgery, and they elect to proceed. All questions were answered.         Daisy Fowler PA-C  Orthopedics  [facility]

## 2022-05-27 NOTE — OP NOTE
[facility] -  Orthopaedic Surgery & Sports Medicine  [facility]    OPERATIVE NOTE    Procedure Date: 5/27/2022     Preoperative Diagnosis:  · Acute medial meniscus tear of left knee, subsequent encounter [S83.242D]    Postoperative Diagnosis:  · Post-Op Diagnosis Codes:  ·    * Acute medial meniscus tear of left knee, subsequent encounter [S83.242D]    Surgeon: Rudy Dempsey MD    Procedure Performed:  · Procedure(s) (LRB):  · ARTHROSCOPY, KNEE (Left)     · Left knee partial medial meniscectomy (approximately 60-70% left of the posterior horn) and lateral meniscectomy (small flap on anterior horn  · Left knee chondroplasty of the left knee medial femoral condyle and medial tibial plateau    Anesthesia: General     Block: None    Fluids: Per Anesthesia Record    UOP: Per Anesthesia Record    Blood Loss: * No values recorded between 5/27/2022  7:27 AM and 5/27/2022  7:55 AM *    Implants: * No implants in log *    Specimens:   Specimen (24h ago, onward)            None           Drains: None    Tourniquet Time: none    Complications: No    Fluoro/C-arm utilized during the case: None    Preoperative Exam Under Anesthesia Findings:   ROM: 0-130  Lachman: 1A   Pivot Shift: Negative   Anterior Drawer: Negative   Posterior Drawer: Negative  Varus @ 0: Stable  Varus @ 30: Stable   Dial Test @ 0: Negative  Dial Test @ 30: Negative  Valgus @ 0: Stable  Valgus @ 30: Stable    Intraoperative Findings:   ACL: Intact  PCL: Intact  Medial Meniscus: There was complex tearing of the posterior horn that involved two large unstable flaps plus additional tearing extending into the rest of the body and the horn. The roots were intact. The flaps were not viable and not able to be repaired  Lateral Meniscus: small flap at anterior horn near root. Otherwise intact and roots intact.  MFC: 2x1.5 cm grade 2 lesion  MTP: grade 1  LFC: normal  LTP: normal  Patella: normal  Trochlea: normal  Gutters: synovitic, no loose  bodies    Indications for Procedure and Brief History: This is a 51 year old female with a history of left sided medial knee pain for over a year that failed extensive conservative management including physical therapy and activity modifications.  Advanced imaging demonstrated meniscal tearing that appeared to possibly be unstable was consistent with her symptoms. I had a long discussion with the patient about treatment options. We discussed operative and non-operative options. We discussed the risks of surgery at length, including but not limited to pain, infection, bleeding, damage to adjacent structures such as nerves and blood vessels, failure to heal, stiffness, laxity, need for more surgery, stroke, blood clot, loss of life, loss of limb, need for removal of any implants used, anesthesia risks, breathing problems, and heart problems. We talked about common and uncommon risks. We discussed risks that were higher specific to the patient.  Patient has a history of PE and she received clearance from Hematology-Oncology.  She understood the increased risk associated with this as well as other medical conditions. I had a long discussion with the patient and any present family regarding treatment options. I explained that although the meniscus will usually not heal on its own, not all meniscus tears need surgery. We discussed that many people will improve with therapy and nonoperative management. We discussed the blood supply of the meniscus and the fact that some patients and some tear patterns are more amenable to repair. We discussed that when performing operative treatment of meniscus tears, we attempt to repair tears that we think need to be repaired and have a good chance of healing. If we do perform a meniscectomy, we attempt to leave as much meniscus intact as possible. We discussed the implications of having a torn or deficient meniscus. We discussed the rehab, weight bearing, and postoperative differences  between repair and resection, and we discussed postoperative expectations. They expressed understanding of the risks and opted to proceed with surgical management.    Description of Procedure: I met the the patient in the preoperative holding area. I identified, confirmed, and marked the operative extremity. All questions were answered. The patient was then taken back to the operating room and transferred to the operative table. The patient was placed in the supine position. All bony prominences were padded. A foot pad was placed to assist positioning at 90 degrees and at hyperflexion. Anesthesia was induced without complication. A tourniquet with adequate padding was placed at the proximal thigh. The operative extremity was then prepped and draped in the standard sterile fashion with a chlorhexidine and alcohol solution. A timeout was performed to ensure we had the proper patient, proper operative site, and were performing the proper procedure. All members of the operative team were in agreement with this. Intravenous antibiotics were administered within 60 minutes prior to the incision.     I began the procedure by performing a diagnostic arthroscopy.  I made my initial portal with a 11 knife anterolaterally, just adjacent to the patellar tendon next to the inferior pole of the patella. I then made an anteromedial portal utilizing a spinal needle for localization under arthroscopic visualization. I made the first portal adjacent to the medial border of the patellar tendon and just above the meniscus. I then gently resected excess fat pad with an arthroscopic shaver only as needed for visualization. I then performed a standard diagnostic arthroscopy, examining all compartments and intra-articular spaces of the knee. The key findings are listed above. I switched between all of the portals for viewing and instrumentation throughout the case as needed, and switched between and 30 degree and 70 degree scope as  needed.    Meniscus Surgery:  We performed a partial medial meniscectomy of the posterior horn flaps.  These flaps were not viable in extremely weak tissue.  I used an arthroscopic torpedo shaver to debride back these flaps and then was able to get to a good stable base without removing further tissue.  We smoothed this out so that it was contoured nicely while remaining meniscus was kept intact as much as possible.  These flaps from the posterior horn extended into the posterior aspect of the body.  The root was intact and showed no signs of damage posteriorly or anteriorly.  There was approximately 30% to 40% of the posterior horn of the medial meniscus that remained in place.    There was a small flap in the anterior horn near the anterior root of the lateral meniscus studies knots topic shaver to gently debride back.  This was less than 10% of the anterior horn and over 90% of the anterior horn remained intact.    Additional Surgical Procedures:  I then performed a chondroplasty of the medial femoral condyle and medial tibial plateau.  I used an Arthrex torpedo shaver to gently debride back unstable cartilage flaps while not disturbing viable cartilage.  This was a partial-thickness lesion the weight-bearing zone medial femoral condyle and small fraying in the medial tibial plateau.    We then repeated our diagnostic arthroscopy to make sure there was no surgical debris, and to reexamine the knee. We then suctioned out excess arthroscopic fluid, and we performed a closure using  nylon in the skin. We placed sterile dressings over the wound. All sponge, instrument, and needle counts were correct x 2 at the end of the case. I was present and performed all key portions of the procedure.  The patient was awoken from anesthesia transported to the PACU in stable condition. The patient was examined postoperatively and the limb was warm and well perfused with appropriate neurovascular status relative to preoperative  status and block status.     Condition: Good    Disposition: PACU - hemodynamically stable.    Attestation: I was present and scrubbed for the entire procedure.    Postoperative Plan:  · Meniscectomy protocol  · Weight bearing:  As tolerated  · Range of motion:  As tolerated  · DVT Ppx:  Lovenox per Hematology recommendation  · PT/OT: Start POD#2 or #3.  Start home exercises today.  Went over this with family.  · Follow-up: 10-14 days        Rudy Dempsey MD  Orthopaedic Surgery & Sports Medicine

## 2022-05-27 NOTE — PATIENT INSTRUCTIONS
Knee Surgery Post-Operative Instructions     Rudy Dempsey MD   [address] [place] [address]  Ph: [phone] Fax: [phone]    Daisy Jordan PA-C  [phone] (cell)  [email or web address]    Dr. Rudy Dempsey   [phone] (cell)  [email or web address]      After you get home, apply ice to your knee but keep the bandages dry. You may apply ice?for 15-20 minutes every 1-2 hours for first week. Ice helps to reduce pain and?swelling. Never apply ice directly to the skin. If you are using a CryoCuff/PolarIce, it should be ice cold for no more than 15-20 minutes every 1-2 hours.     Elevate your leg on 2-3 pillows or rolled up towels placed under the heel so that the heel?is elevated higher than your knee. This will help reduce swelling and achieve full?extension of the knee.     It is important to get up and move around after your surgery. It's good for your lungs after anesthesia, and also good for your circulation to help prevent blood clots from developing.  However, too much walking will cause the knee to swell and hurt.     After 72 hours, you can remove the ACE wrap and bandages. You should then place new gauze/bandages and ACE wrap each day for 2 weeks.     You may shower, but the incisions, ACE bandages, and Brace must not get wet until 72 hours after surgery and only if there is no drainage at all from the incisions. Do not soak the knee under water for 2 weeks.     Weight-Bearing Status: You are to be (weight bearing) on your operative leg.? Range of motion: As tolerated    Take the pain medicine as needed. You may take up to 2 tablets every 4-6 hours if?needed. As the pain subsides try to increase the time between doses.      Your first post-operative check-up with Dr. Dempsey 10-14 days from the?day of surgery.        It is normal to have some discomfort and swelling, as well as a small amount of blood-tinged drainage, following surgery. If this becomes severe,  or if you develop a fever greater than or equal to?101 degrees, calf pain, or shortness of breath or chest pain, please call immediately. If?you have questions or problems, call the office at [phone].     NORMAL SENSATIONS AND FINDINGS AFTER SURGERY   Shin pain   Knee swelling and warmth up to 2 weeks   Small amounts of bloody drainage   Numbness around the incision area   Soreness and swelling in the back of the knee   Bruising to the lower leg   Lower leg swelling, including the ankle - if this occurs elevate the leg above the heart?and apply ice to the swollen areas.   Numbness to the foot if you had a nerve block - will resolve within a few days   Low grade temperature less than 101.5 - if this occurs drink plenty of fluids and cough?and deep breathe (take 10 breaths, on the last hold for a second then forcefully cough a?few times). A low grade temp is normal for a week after surgery   Small amount of redness to the area where the sutures insert in the skin  Low back discomfort due to the epidural / spinal anesthesia apply a heating pad as?needed      NOTIFY OUR OFFICE IMMEDIATELY AT [phone] IF ANY OF THE FOLLOWING SIGNS OR SYMPTOMS OCCUR:   Chest pain or shortness of breath   Change is noted to your incision (i.e. increased redness or drainage)   Numbness of your foot if you didn't have a nerve block   Sharp pains in the back of your hip, thigh, or calf   Temperature greater than 101.5 degrees   Fever, chills, nausea, vomiting or diarrhea   Stitches loosen or fall out and incisions open up   Thick, foul-smelling drainage (yellow or greenish)   Increased pain which is not relieved by medications or other measures mentioned above       Knee & Quad Exercise Instructions     Rudy Dempsey MD   [address] [place] [address]  Ph: [phone] Fax: [phone]       Exercises listed are to be performed by the patient following surgery. Perform sets of 10 repetitions, 4 times per  day.        Heel Slides        Lie flat or sit with your leg straight. Slide your heel toward your hip. Try to get your knee bent to a 90° angle. Slide your heel back so your leg is straight then relax.       Knee Extension (Lying Down)      While lying down, rest your ankle on a towel roll so that your knee and calf are not touching the floor. Allow gravity to straighten your knee. Maintain this position for up to 10 minutes.       Knee Extension (Sitting in a Chair)      While sitting in a chair, prop your heel on another chair so that there is nothing behind your calf or knee. Allow gravity to straighten your knee. Maintain this position for up to 10 minute       Patellar Mobilization      This exercise is done by simply pushing the patella up and down and side to side and holding that position. Movement of the patella is essential when restoring range of motion. If the patella cannot move within the femoral groove, then the knee cannot bend and extend.?         Quadriceps Isometrics (Quad Sets)        Lie flat or sit with your surgical leg straight. Tighten the muscle in the front of your thigh as much as you can, pushing the back or your knee flat against the floor. Hold this tight for 5 seconds then relax.        Straight Leg Raises (SLR)      Lie flat or sit with your leg straight and your knee brace on (if you have one). You may have your non-operative knee bent slightly for comfort. Perform a Quad set (as above) and flex your toes straight up. Lift your heel off of the floor and hold for at least 5 seconds. Keep your thigh muscle as tight as you can and lower your heel back down then relax.       Seated Knee Flexion        Sit with your legs dangling over the bed. Relax your leg allowing gravity to bend your knee. You may use your non-operative leg to gently push your operative leg into more of a bend. Maintain this position for up to 10 minutes.        Calf Pumps         Point and flex your toes to tighten  your calf muscles.

## 2022-05-27 NOTE — TRANSFER OF CARE
"Anesthesia Transfer of Care Note    Patient: Mable Redding    Procedure(s) Performed: Procedure(s) (LRB):  ARTHROSCOPY, KNEE (Left)    Patient location: PACU    Anesthesia Type: general    Transport from OR: Transported from OR on room air with adequate spontaneous ventilation    Post pain: adequate analgesia    Post assessment: no apparent anesthetic complications    Post vital signs: stable    Level of consciousness: sedated    Nausea/Vomiting: no nausea/vomiting    Complications: none    Transfer of care protocol was followed      Last vitals:   Visit Vitals  /76 (BP Location: Right arm, Patient Position: Sitting)   Pulse 72   Temp 36.3 °C (97.4 °F) (Temporal)   Resp 18   Ht 5' 6" (1.676 m)   Wt 96.6 kg (213 lb 1.2 oz)   SpO2 95%   Breastfeeding No   BMI 34.39 kg/m²     "

## 2022-05-27 NOTE — BRIEF OP NOTE
"[facility] - Per[facility] Services  Brief Operative Note    Surgery Date: 5/27/2022     Surgeon(s) and Role:     * Rudy Dempsey MD - Primary    Assisting Surgeon: Daisy Jordan PA-C    Pre-op Diagnosis:  Acute medial meniscus tear of left knee, subsequent encounter [S83.242D]    Post-op Diagnosis:  Post-Op Diagnosis Codes:     * Acute medial meniscus tear of left knee, subsequent encounter [S83.242D]    Procedure(s) (LRB):  ARTHROSCOPY, KNEE (Left)    Anesthesia: General    Operative Findings:   Left knee medial menisectomy and left knee chondroplasty    Estimated Blood Loss: * No values recorded between 5/27/2022  7:27 AM and 5/27/2022  7:55 AM *         Specimens:   Specimen (24h ago, onward)            None            Discharge Note    OUTCOME: Patient tolerated treatment/procedure well without complication and is now ready for discharge.    DISPOSITION: Home or Self Care    FINAL DIAGNOSIS:  Left knee pain    FOLLOWUP: In clinic    DISCHARGE INSTRUCTIONS:    Discharge Procedure Orders   CRUTCHES FOR HOME USE     Order Specific Question Answer Comments   Type: Axillary    Height: 5' 6" (1.676 m)    Weight: 96.6 kg (213 lb 1.2 oz)    Length of need (1-99 months): 2 weeks     Diet general     Call MD for:  temperature >100.4     Call MD for:  persistent nausea and vomiting     Call MD for:  severe uncontrolled pain     Call MD for:  difficulty breathing, headache or visual disturbances     Call MD for:  redness, tenderness, or signs of infection (pain, swelling, redness, odor or green/yellow discharge around incision site)     Call MD for:  hives     Call MD for:  persistent dizziness or light-headedness     Call MD for:  extreme fatigue     Ice to affected area   Order Comments: using barrier between ice and skin (specify duration&frequency)     No driving, operating heavy equipment or signing legal documents while taking pain medication     Change dressing (specify)   Order Comments: Dressing change: Dressing " change at first physical therapy appointment     Activity as tolerated     Weight bearing as tolerated

## 2022-05-27 NOTE — PLAN OF CARE
POC reviewed with patient and significant other both deny any current questions, complaints, or concerns at this time.     Reviewed and completed all discharge orders.   Printed AVS and educated patient and family member of its entirety, including physician's orders, follow-up appt, medications, when to call, and when to report to the emergency room. Reviewed prescriptions, pharmacy information, and made sure there were no conflicts preventing the patient from obtaining the newly prescribed medications. I encouraged questions, answered them thoroughly, and evaluated my instructions via teach-back method. Patient has met all hospital discharge criteria at this point.

## 2022-05-27 NOTE — DISCHARGE SUMMARY
"[facility]  Discharge Note  Short Stay    Procedure(s) (LRB):  ARTHROSCOPY, KNEE (Left)    OUTCOME: Patient tolerated treatment/procedure well without complication and is now ready for discharge.    DISPOSITION: Home or Self Care    FINAL DIAGNOSIS:  Left knee pain    FOLLOWUP: In clinic    DISCHARGE INSTRUCTIONS:    Discharge Procedure Orders   CRUTCHES FOR HOME USE     Order Specific Question Answer Comments   Type: Axillary    Height: 5' 6" (1.676 m)    Weight: 96.6 kg (213 lb 1.2 oz)    Length of need (1-99 months): 2 weeks     Diet general     Call MD for:  temperature >100.4     Call MD for:  persistent nausea and vomiting     Call MD for:  severe uncontrolled pain     Call MD for:  difficulty breathing, headache or visual disturbances     Call MD for:  redness, tenderness, or signs of infection (pain, swelling, redness, odor or green/yellow discharge around incision site)     Call MD for:  hives     Call MD for:  persistent dizziness or light-headedness     Call MD for:  extreme fatigue     Ice to affected area   Order Comments: using barrier between ice and skin (specify duration&frequency)     No driving, operating heavy equipment or signing legal documents while taking pain medication     Change dressing (specify)   Order Comments: Dressing change: Dressing change at first physical therapy appointment     Activity as tolerated     Weight bearing as tolerated        TIME SPENT ON DISCHARGE: 30 minutes  "

## 2022-05-27 NOTE — ANESTHESIA POSTPROCEDURE EVALUATION
Anesthesia Post Evaluation    Patient: Mable Redding    Procedure(s) Performed: Procedure(s) (LRB):  ARTHROSCOPY, KNEE (Left)    Final Anesthesia Type: general      Patient location during evaluation: PACU  Patient participation: Yes- Able to Participate  Level of consciousness: awake and alert and oriented  Post-procedure vital signs: reviewed and stable  Pain management: adequate  Airway patency: patent    PONV status at discharge: No PONV  Anesthetic complications: no      Cardiovascular status: blood pressure returned to baseline, stable and hemodynamically stable  Respiratory status: unassisted  Hydration status: euvolemic  Follow-up not needed.          Vitals Value Taken Time   /70 05/27/22 0945   Temp 36.7 °C (98.1 °F) 05/27/22 0945   Pulse 80 05/27/22 0945   Resp 20 05/27/22 0945   SpO2 100 % 05/27/22 0945         Event Time   Out of Recovery 08:15:00         Pain/Ashlie Score: Pain Rating Prior to Med Admin: 4 (5/27/2022  9:20 AM)  Ashlie Score: 10 (5/27/2022  9:45 AM)

## 2022-05-27 NOTE — PROGRESS NOTES
Post-Operative Progress Note    Patient reassessed in PACU  Nurse at bedside  Patient awake & vitals stable    Assessment:  Left lower extremity   Dressing clean, dry, intact   Brace: none   Polar care in place   All compartments are soft and compressible.    Calf soft non-tender. Intact EHL, FHL, gastroc soleus, and tibialis anterior. Sensation intact to light touch in superficial peroneal, deep peroneal, tibial, sural, and saphenous nerve distributions.    Foot warm and well perfused with capillary refill of less than 2 seconds and palpable pedal pulses.     Plan:   Follow up with Daisy in 2 weeks in clinic   Start physical therapy: [facility] on 5/30   Post-op x-rays: none   Pain: Norco 5mg, disp 30   Anti-biotics: none   DVT PPX: Lovenox 30mg po daily x 3 weeks starting tomorrow.  o Continue DVT ppx as prescribe to prevent any blood clots

## 2022-05-27 NOTE — TELEPHONE ENCOUNTER
Called patient to check on her from surgery. Doing well no issues. No complains. Received all meds and no questions.

## 2022-05-30 ENCOUNTER — CLINICAL SUPPORT (OUTPATIENT)
Dept: REHABILITATION | Facility: HOSPITAL | Age: 52
End: 2022-05-30
Attending: ORTHOPAEDIC SURGERY
Payer: COMMERCIAL

## 2022-05-30 ENCOUNTER — TELEPHONE (OUTPATIENT)
Dept: ORTHOPEDICS | Facility: CLINIC | Age: 52
End: 2022-05-30
Payer: COMMERCIAL

## 2022-05-30 DIAGNOSIS — M25.562 ACUTE PAIN OF LEFT KNEE: ICD-10-CM

## 2022-05-30 DIAGNOSIS — M25.662 DECREASED RANGE OF MOTION OF LEFT KNEE: ICD-10-CM

## 2022-05-30 DIAGNOSIS — R29.898 WEAKNESS OF LEFT LOWER EXTREMITY: ICD-10-CM

## 2022-05-30 DIAGNOSIS — S83.242D ACUTE MEDIAL MENISCUS TEAR OF LEFT KNEE, SUBSEQUENT ENCOUNTER: ICD-10-CM

## 2022-05-30 PROCEDURE — 97110 THERAPEUTIC EXERCISES: CPT | Performed by: PHYSICAL THERAPIST

## 2022-05-30 PROCEDURE — 97162 PT EVAL MOD COMPLEX 30 MIN: CPT | Performed by: PHYSICAL THERAPIST

## 2022-05-30 NOTE — PLAN OF CARE
[facility]  Physical Therapy Initial Evaluation    Name: Mable Redding  Clinic Number: [id number]    Therapy Diagnosis:   Encounter Diagnoses   Name Primary?    Acute medial meniscus tear of left knee, subsequent encounter     Acute pain of left knee     Decreased range of motion of left knee     Weakness of left lower extremity      Physician: Rudy Dempsey MD    Physician Orders: PT Eval and Treat   Medical Diagnosis from Referral: S83.242D (ICD-10-CM) - Acute medial meniscus tear of left knee, subsequent encounter  Evaluation Date: 2022  Authorization Period Expiration: 2022  Plan of Care Expiration: 2022  Visit # / Visits authorized:   FOTO: 1/3      Time In: 1100  Time Out: 1200  Total Billable Time: 60 minutes     Precautions: Standard    Surgery: Left knee arthroscopy on 2022: WBAT, ROM AT.     Subjective   Date of onset: 2022  History of current condition - Mable reports: having left knee surgery on Friday for chronic meniscal issues. Pt reports soreness, tightness. Starting to push pain medicine out further and taking less with each day. Walking with no crutches at this time and began on saturday. Trying to walk without knee bent due to walking bent for long period of time for pain relief. ACE wrap donned.      Medical History:   Past Medical History:   Diagnosis Date    Anxiety     Bursitis     Right hip    COVID-19 11/3/2021    Depression     Digestive disorder     Herpes genitalia     History of blood clots     HPV (human papilloma virus) infection     HTN (hypertension) 2022    Lupus anticoagulant disorder     Lupus anticoagulant disorder 2019    Pulmonary embolus     Thyroid disease        Surgical History:   Mable Redding  has a past surgical history that includes Hysterectomy (2017); Tonsillectomy;  section; Dilation and curettage of uterus; Hysteroscopy; Lipoma resection; Adenoidectomy;  robotic hysterectomy, with salpingectomy (Bilateral); and Other surgical history.    Medications:   Mable has a current medication list which includes the following prescription(s): alprazolam, clotrimazole-betamethasone 1-0.05%, cyclobenzaprine, docusate sodium, docusate sodium, enoxaparin, ezetimibe, finasteride, hydrochlorothiazide, hydrocodone-acetaminophen, hydrocortisone, hydroxychloroquine, ivermectin, loratadine, losartan, metoprolol succinate, mupirocin, np thyroid, ondansetron, pantoprazole, rosuvastatin, venlafaxine, vitamin d, and [DISCONTINUED] clobetasol, and the following Facility-Administered Medications: acetaminophen, albuterol, epinephrine, methylprednisolone sodium succinate, and sodium chloride 0.9%.    Allergies:   Review of patient's allergies indicates:   Allergen Reactions    Aldactone [spironolactone] Swelling    Remdesivir Hives    Sulfa (sulfonamide antibiotics) Edema        Prior Therapy: Yes  Social History:  lives with their spouse  Occupation: Nurse Practitioner   Prior Level of Function: unable to walk without pain  Current Level of Function: able to walk but with antalgic gait and stiffness.     Pain:   Current 4/10, worst 5/10, best 0/10   Location: left knee   Description: Aching, Dull and Tight  Aggravating Factors: Standing, Walking, Lifting and Getting out of bed/chair  Easing Factors: ice    Pts goals: improve walking tolerance, decrease knee pain    Objective     Posture: decreased knee extension in standing/walking  Palpation: tenderness along anterior knee, tightness in posterior knee  Sensation: no deficits noted.     Range of Motion/Strength:   Hip Right Left Pain/Dysfunction with Movement   AROM/PROM WNL WNL      Knee Right Left Pain/Dysfunction with Movement   AROM      flexion 128 71    extension -4 12      Ankle Right Left Pain/Dysfunction with Movement   AROM/PROM Full Full      L/E MMT Right Left Pain/Dysfunction with Movement   Hip Flexion 5/5 4+/5    Hip  "Extension 5/5 5/5    Hip Abduction 5/5 4+/5    Hip Adduction 5/5 5/5    Knee Flexion 5/5 2/5    Knee Extension 5/5 2-/5    Ankle DF 5/5 5/5    Ankle PF 5/5 5/5    Ankle Inversion 5/5 5/5    Ankle Eversion 5/5 5/5      Flexibility: Stiffness noted in left knee overall, hamstring length significantly decreased along LLE compared to RLE.     Special Tests: NT    Gait Analysis: Antalgic gait noted along left knee, decreased knee extension overall. Does have bilateral crutches but not using at this time.     Single Leg Stance: R 10 seconds, L 2 seconds      CMS Impairment/Limitation/Restriction for FOTO Knee Survey    Therapist reviewed FOTO scores for Mable Redding on 5/30/2022.   FOTO documents entered into [employer] - see Media section.    Limitation Score: 53%         TREATMENT   Treatment Time In: 1130  Treatment Time Out: 1200  Total Treatment time separate from Evaluation: 30 minutes    Mable received therapeutic exercises to develop strength, endurance, ROM, flexibility, posture and core stabilization for 30 minutes including:  Quad sets 5" hold, 20x  SAQ 3" hold, 20x  Heel slides with no overpressure 30x  Quad sets with DF overpressure 10" hold, 10x  Prone TKE 3" hold, 20x  Knee passive ROM hold with heel prop 10" hold, 5x  LAQ 20x, slow and controlled.       Home Exercises Provided and Patient Education Provided     Education provided:   - HEP provided  - Home modalities prn  - Education on return to work in 2 weeks     Written Home Exercises Provided: yes.  Exercises were reviewed and Mable was able to demonstrate them prior to the end of the session.  Mable demonstrated good  understanding of the education provided.     See EMR under Patient Instructions for exercises provided on evaluation.      Assessment   Mable is a 51 y.o. female referred to outpatient Physical Therapy with a medical diagnosis of S/P Left Knee Arthroscopy. Pt presents with left knee stiffness and discomfort overall, mainly with " end range extension. Pt will benefit from quadriceps strengthening along with improved flexibility to improve gait mechanics.     Pt prognosis is Excellent.   Pt will benefit from skilled outpatient Physical Therapy to address the deficits stated above and in the chart below, provide pt/family education, and to maximize pt's level of independence.     Plan of care discussed with patient: Yes  Pt's spiritual, cultural and educational needs considered and patient is agreeable to the plan of care and goals as stated below:     Anticipated Barriers for therapy: None.     Medical Necessity is demonstrated by the following  History  Co-morbidities and personal factors that may impact the plan of care Co-morbidities:   high BMI    Personal Factors:   no deficits     moderate   Examination  Body Structures and Functions, activity limitations and participation restrictions that may impact the plan of care Body Regions:   lower extremities    Body Systems:    gross symmetry  ROM  strength  gross coordinated movement  balance  gait  transfers    Participation Restrictions:   Walking    Activity limitations:   Learning and applying knowledge  no deficits    General Tasks and Commands  undertaking a single task    Communication  no deficits    Mobility  lifting and carrying objects  walking    Self care  no deficits    Domestic Life  doing house work (cleaning house, washing dishes, laundry)    Interactions/Relationships  no deficits    Life Areas  employment    Community and Social Life  community life  recreation and leisure         high   Clinical Presentation evolving clinical presentation with changing clinical characteristics moderate   Decision Making/ Complexity Score: moderate       Goals:  Short Term Goals (4 Weeks):  1. Patient will be compliant with home exercise program to supplement therapy in restoring pain free function.  2. Patient will improve impaired lower extremity manual muscle tests to >/= 4/5 to improve  dynamic hip/knee support for functional tasks.  3. Patient will demonstrate Full symmetrical ROM compared to RLE to improve gait mechanics    Long Term Goals (8 Weeks):  1. Patient will improve FOTO score to </= 25% limited to decrease perceived limitation with mobility.   2. Patient will improve impaired lower extremity manual muscle tests to >/= 4+/5 to improve dynamic hip/knee support for functional tasks.  3. Patient will demonstrate proper gait mechanics with full knee extension and no increase in pain.   4. Patient will demonstrate full squat to ground with 10# box lift with no increase in knee pain to show functional improvement.        Plan   Plan of care Certification: 5/30/2022 to 7/30/2022.    Outpatient Physical Therapy 2 times weekly for 8 weeks to include the following interventions: Aquatic Therapy, Electrical Stimulation IFC, Russian, Gait Training, Manual Therapy, Moist Heat/ Ice, Neuromuscular Re-ed, Patient Education, Self Care, Therapeutic Activities, Therapeutic Exercise and Ultrasound, ASTYM, Kinesiotaping PRN, Functional Dry Needling    Nnamdi Boucher, PT, DPT

## 2022-05-31 ENCOUNTER — CLINICAL SUPPORT (OUTPATIENT)
Dept: REHABILITATION | Facility: HOSPITAL | Age: 52
End: 2022-05-31
Attending: ORTHOPAEDIC SURGERY
Payer: COMMERCIAL

## 2022-05-31 DIAGNOSIS — M25.662 DECREASED RANGE OF MOTION OF LEFT KNEE: ICD-10-CM

## 2022-05-31 DIAGNOSIS — R29.898 WEAKNESS OF LEFT LOWER EXTREMITY: ICD-10-CM

## 2022-05-31 DIAGNOSIS — M25.562 ACUTE PAIN OF LEFT KNEE: Primary | ICD-10-CM

## 2022-05-31 PROCEDURE — 97140 MANUAL THERAPY 1/> REGIONS: CPT | Performed by: PHYSICAL THERAPIST

## 2022-05-31 PROCEDURE — 97110 THERAPEUTIC EXERCISES: CPT | Performed by: PHYSICAL THERAPIST

## 2022-05-31 NOTE — PROGRESS NOTES
"[facility]   Physical Therapy Treatment Note     Name: Mable Krishnamurthy [facility]  Clinic Number: [id number]    Therapy Diagnosis:   Encounter Diagnoses   Name Primary?    Acute pain of left knee Yes    Decreased range of motion of left knee     Weakness of left lower extremity      Physician: Rudy Dempsey MD    Visit Date: 5/31/2022    Physician Orders: PT Eval and Treat   Medical Diagnosis from Referral: S83.242D (ICD-10-CM) - Acute medial meniscus tear of left knee, subsequent encounter  Evaluation Date: 5/30/2022  Authorization Period Expiration: 7/1/2022  Plan of Care Expiration: 7/30/2022  Visit # / Visits authorized: 1/20 (+eval)  FOTO: 1/3  PTA Visit #: 0/5     Time In: 0930  Time Out: 1025  Total Billable Time: 53 minutes    Precautions: Standard     Surgery: Left knee Arthroscopy on 5/27/2022. WBAT. ROM AT.     SUBJECTIVE     Pt reports: some swelling last night but no increase in acute pain.     She was compliant with home exercise program.  Response to previous treatment: increase in swelling  Functional change: none yet    Pain: 3/10  Location: left knee      OBJECTIVE     Objective Measures updated at progress report unless specified.     TREATMENT     Mable received the treatments listed below:      Mable received therapeutic exercises to develop strength, endurance, ROM, flexibility, posture and core stabilization for 38 minutes including:    Quad sets 5" hold, 20x  Prone TKE 5" hold, 20x  Quad sets with DF overpressure 10" x 10  SAQ 3" hold, 20x  LAQ with strap 2" hold, 20x  Standing TKE 5" hold, 15x  Standing gastroc stretch 30" hold, 3x  Shuttle DL 6 bands 25x  Shuttle SL 4 bands 20x  Heel slides 20x    Mable received the following manual therapy techniques: Joint mobilizations, Myofacial release and Soft tissue Mobilization were applied to the: left knee for 15 minutes, including:    ASTYM to left knee, anterior and posterior  PF mobilizations  Grade I/II " extension mobilizations.     Mable participated in neuromuscular re-education activities to improve: Balance, Coordination, Kinesthetic, Sense, Proprioception and Posture for 0 minutes. The following activities were included:    Mable participated in dynamic functional therapeutic activities to improve functional performance for 0  minutes, including:      Patient Education and Home Exercises     Education provided:   - HEP provided  - Home modalities prn  - Education on return to work in 2 weeks      Written Home Exercises Provided: yes.  Exercises were reviewed and Mable was able to demonstrate them prior to the end of the session.  Mable demonstrated good  understanding of the education provided.      See EMR under Patient Instructions for exercises provided on evaluation.    ASSESSMENT     Pt continues to progress with ROM and strength in left knee. Pt feels some limitation in end range flexion and extension. Pt does have discomfort in extension but educated to work through to improve extension to normalize gait mechanics.     Mable Is progressing well towards her goals.   Pt prognosis is Excellent.     Pt will continue to benefit from skilled outpatient physical therapy to address the deficits listed in the problem list box on initial evaluation, provide pt/family education and to maximize pt's level of independence in the home and community environment.     Pt's spiritual, cultural and educational needs considered and pt agreeable to plan of care and goals.     Anticipated barriers to physical therapy: None    Goals:  Short Term Goals (4 Weeks):  1. Patient will be compliant with home exercise program to supplement therapy in restoring pain free function.  2. Patient will improve impaired lower extremity manual muscle tests to >/= 4/5 to improve dynamic hip/knee support for functional tasks.  3. Patient will demonstrate Full symmetrical ROM compared to RLE to improve gait mechanics     Long Term  Goals (8 Weeks):  1. Patient will improve FOTO score to </= 25% limited to decrease perceived limitation with mobility.   2. Patient will improve impaired lower extremity manual muscle tests to >/= 4+/5 to improve dynamic hip/knee support for functional tasks.  3. Patient will demonstrate proper gait mechanics with full knee extension and no increase in pain.   4. Patient will demonstrate full squat to ground with 10# box lift with no increase in knee pain to show functional improvement.      PLAN     Continue with physical therapy as planned.     Plan of care Certification: 5/30/2022 to 7/30/2022.     Outpatient Physical Therapy 2 times weekly for 8 weeks to include the following interventions: Aquatic Therapy, Electrical Stimulation IFC, Russian, Gait Training, Manual Therapy, Moist Heat/ Ice, Neuromuscular Re-ed, Patient Education, Self Care, Therapeutic Activities, Therapeutic Exercise and Ultrasound, ASTYM, Kinesiotaping PRN, Functional Dry Needling       Nnamdi Boucher, PT, DPT

## 2022-06-01 ENCOUNTER — PATIENT MESSAGE (OUTPATIENT)
Dept: REHABILITATION | Facility: HOSPITAL | Age: 52
End: 2022-06-01
Payer: COMMERCIAL

## 2022-06-03 ENCOUNTER — CLINICAL SUPPORT (OUTPATIENT)
Dept: REHABILITATION | Facility: HOSPITAL | Age: 52
End: 2022-06-03
Payer: COMMERCIAL

## 2022-06-03 DIAGNOSIS — M25.662 DECREASED RANGE OF MOTION OF LEFT KNEE: ICD-10-CM

## 2022-06-03 DIAGNOSIS — R29.898 WEAKNESS OF LEFT LOWER EXTREMITY: ICD-10-CM

## 2022-06-03 DIAGNOSIS — M25.562 ACUTE PAIN OF LEFT KNEE: Primary | ICD-10-CM

## 2022-06-03 PROCEDURE — 97110 THERAPEUTIC EXERCISES: CPT | Performed by: PHYSICAL THERAPIST

## 2022-06-03 PROCEDURE — 97140 MANUAL THERAPY 1/> REGIONS: CPT | Performed by: PHYSICAL THERAPIST

## 2022-06-03 NOTE — PROGRESS NOTES
"[facility]   Physical Therapy Treatment Note     Name: Mable Jordanden  Clinic Number: [id number]    Therapy Diagnosis:   Encounter Diagnoses   Name Primary?    Acute pain of left knee Yes    Decreased range of motion of left knee     Weakness of left lower extremity      Physician: Rudy Dempsey MD    Visit Date: 6/3/2022    Physician Orders: PT Eval and Treat   Medical Diagnosis from Referral: S83.242D (ICD-10-CM) - Acute medial meniscus tear of left knee, subsequent encounter  Evaluation Date: 5/30/2022  Authorization Period Expiration: 7/1/2022  Plan of Care Expiration: 7/30/2022  Visit # / Visits authorized: 2/20 (+eval)  FOTO: 1/3  PTA Visit #: 0/5     Time In: 1220  Time Out: 120  Total Billable Time: 60 minutes    Precautions: Standard     Surgery: Left knee Arthroscopy on 5/27/2022. WBAT. ROM AT.     SUBJECTIVE     Pt reports: does have some knee pain with increase in swelling. Still able to walk on knee without crutches.     She was compliant with home exercise program.  Response to previous treatment: increase in swelling  Functional change: none yet    Pain: 3/10  Location: left knee      OBJECTIVE     Objective Measures updated at progress report unless specified.     TREATMENT     Mable received the treatments listed below:      Mable received therapeutic exercises to develop strength, endurance, ROM, flexibility, posture and core stabilization for 45 minutes including:    Quad sets 5" hold, 20x  Prone TKE 5" hold, 20x  Quad sets with DF overpressure 10" x 10  SAQ 3" hold, 20x 2#  LAQ with strap 2" hold, 20x  Standing TKE 5" hold, 15x - purple SC  Standing gastroc stretch 30" hold, 3x  Shuttle DL 6 bands 25x  Shuttle SL 4 bands 20x  Heel slides 30x  SLR 20x - look for lag  Lateral squat walks YTB 2 laps  Monster Walks YTB 2 laps    Mable received the following manual therapy techniques: Joint mobilizations, Myofacial release and Soft tissue Mobilization were " applied to the: left knee for 15 minutes, including:    ASTYM to left knee, anterior and posterior  PF mobilizations  Grade I/II extension mobilizations.     Mable participated in neuromuscular re-education activities to improve: Balance, Coordination, Kinesthetic, Sense, Proprioception and Posture for 0 minutes. The following activities were included:    Mable participated in dynamic functional therapeutic activities to improve functional performance for 0  minutes, including:      Patient Education and Home Exercises     Education provided:   - HEP provided  - Home modalities prn  - Education on return to work in 2 weeks      Written Home Exercises Provided: yes.  Exercises were reviewed and Mable was able to demonstrate them prior to the end of the session.  Mable demonstrated good  understanding of the education provided.      See EMR under Patient Instructions for exercises provided on evaluation.    ASSESSMENT     Pt feels knee is off and on. Some days feel better and others are more stiff. Pt educated to continue working on HEP and strengthening knee joint to stabilize joint overall during functional activity.     Mable Is progressing well towards her goals.   Pt prognosis is Excellent.     Pt will continue to benefit from skilled outpatient physical therapy to address the deficits listed in the problem list box on initial evaluation, provide pt/family education and to maximize pt's level of independence in the home and community environment.     Pt's spiritual, cultural and educational needs considered and pt agreeable to plan of care and goals.     Anticipated barriers to physical therapy: None    Goals:  Short Term Goals (4 Weeks):  1. Patient will be compliant with home exercise program to supplement therapy in restoring pain free function.  2. Patient will improve impaired lower extremity manual muscle tests to >/= 4/5 to improve dynamic hip/knee support for functional tasks.  3. Patient will  demonstrate Full symmetrical ROM compared to RLE to improve gait mechanics     Long Term Goals (8 Weeks):  1. Patient will improve FOTO score to </= 25% limited to decrease perceived limitation with mobility.   2. Patient will improve impaired lower extremity manual muscle tests to >/= 4+/5 to improve dynamic hip/knee support for functional tasks.  3. Patient will demonstrate proper gait mechanics with full knee extension and no increase in pain.   4. Patient will demonstrate full squat to ground with 10# box lift with no increase in knee pain to show functional improvement.      PLAN     Continue with physical therapy as planned.     Plan of care Certification: 5/30/2022 to 7/30/2022.     Outpatient Physical Therapy 2 times weekly for 8 weeks to include the following interventions: Aquatic Therapy, Electrical Stimulation IFC, Russian, Gait Training, Manual Therapy, Moist Heat/ Ice, Neuromuscular Re-ed, Patient Education, Self Care, Therapeutic Activities, Therapeutic Exercise and Ultrasound, ASTYM, Kinesiotaping PRN, Functional Dry Needling       Nnamdi Boucher, PT, DPT

## 2022-06-07 ENCOUNTER — CLINICAL SUPPORT (OUTPATIENT)
Dept: REHABILITATION | Facility: HOSPITAL | Age: 52
End: 2022-06-07
Payer: COMMERCIAL

## 2022-06-07 DIAGNOSIS — M25.662 DECREASED RANGE OF MOTION OF LEFT KNEE: ICD-10-CM

## 2022-06-07 DIAGNOSIS — R29.898 WEAKNESS OF LEFT LOWER EXTREMITY: ICD-10-CM

## 2022-06-07 DIAGNOSIS — M25.562 ACUTE PAIN OF LEFT KNEE: Primary | ICD-10-CM

## 2022-06-07 PROCEDURE — 97112 NEUROMUSCULAR REEDUCATION: CPT

## 2022-06-07 PROCEDURE — 97140 MANUAL THERAPY 1/> REGIONS: CPT

## 2022-06-07 PROCEDURE — 97110 THERAPEUTIC EXERCISES: CPT

## 2022-06-07 NOTE — PROGRESS NOTES
"[facility]   Physical Therapy Treatment Note     Name: Mable Jordanden  Clinic Number: [id number]    Therapy Diagnosis:   Encounter Diagnoses   Name Primary?    Acute pain of left knee Yes    Decreased range of motion of left knee     Weakness of left lower extremity      Physician: Rudy Dempsey MD    Visit Date: 6/7/2022    Physician Orders: PT Eval and Treat   Medical Diagnosis from Referral: S83.242D (ICD-10-CM) - Acute medial meniscus tear of left knee, subsequent encounter  Evaluation Date: 5/30/2022  Authorization Period Expiration: 7/1/2022  Plan of Care Expiration: 7/30/2022  Visit # / Visits authorized: 3/20 (+eval)  FOTO: 1/3  PTA Visit #: 0/5     Time In: 8:00 am  Time Out: 8:50 am  Total Billable Time: 50 minutes    Precautions: Standard     Surgery: Left knee Arthroscopy on 5/27/2022. WBAT. ROM AT.     SUBJECTIVE     Pt reports: decreased pain with ambulation and ADL's.      She was compliant with home exercise program.  Response to previous treatment: increased knee ROM.    Functional change: improved community ambulation.     Pain: 3/10  Location: left knee      OBJECTIVE     Objective Measures updated at progress report unless specified.     TREATMENT     Mable received the treatments listed below:      Mable received therapeutic exercises to develop strength, endurance, ROM, flexibility, posture and core stabilization for 23 minutes including:    Prone TKE 5" hold, 20x  Quad sets with DF overpressure 10" x 10  SAQ 3" hold, 20x  LAQ with strap 2" hold, 20x  Standing TKE 5" hold, 20x  Standing gastroc stretch 30" hold, 3x  Shuttle DL 6 bands 25x  Shuttle SL 4 bands 20x  Heel slides 20x    Mable received the following manual therapy techniques: Joint mobilizations, Myofacial release and Soft tissue Mobilization were applied to the: left knee for 15 minutes, including:    ASTYM to left knee, anterior and posterior  PF mobilizations  Grade I/II extension " mobilizations.     Mable participated in neuromuscular re-education activities to improve: Balance, Coordination, Kinesthetic, Sense, Proprioception and Posture for 10 minutes. The following activities were included:  Supine Hip Adduction: ball x 3 minutes  Supine Hip Abduction: strap x 3 minutes  Quad Set: 3 minutes    Mable participated in dynamic functional therapeutic activities to improve functional performance for 0  minutes, including:      Patient Education and Home Exercises     Education provided:   - HEP provided  - Home modalities prn  - Education on return to work in 2 weeks      Written Home Exercises Provided: yes.  Exercises were reviewed and Mable was able to demonstrate them prior to the end of the session.  Mable demonstrated good  understanding of the education provided.      See EMR under Patient Instructions for exercises provided on evaluation.    ASSESSMENT   Patient demonstrated improved knee flexion ROM after Manual Therapy.  Progressed Neuromuscular Re-education by adding Supine Hip Adduction and Abduction to facilitate activation of hip musculature.  Progressed Therapeutic Exercise by increasing repititions of Standing TKE to improve quadriceps strength.      Mable Is progressing well towards her goals.   Pt prognosis is Excellent.     Pt will continue to benefit from skilled outpatient physical therapy to address the deficits listed in the problem list box on initial evaluation, provide pt/family education and to maximize pt's level of independence in the home and community environment.     Pt's spiritual, cultural and educational needs considered and pt agreeable to plan of care and goals.     Anticipated barriers to physical therapy: None    Goals:  Short Term Goals (4 Weeks):  1. Patient will be compliant with home exercise program to supplement therapy in restoring pain free function.  2. Patient will improve impaired lower extremity manual muscle tests to >/= 4/5 to improve  dynamic hip/knee support for functional tasks.  3. Patient will demonstrate Full symmetrical ROM compared to RLE to improve gait mechanics     Long Term Goals (8 Weeks):  1. Patient will improve FOTO score to </= 25% limited to decrease perceived limitation with mobility.   2. Patient will improve impaired lower extremity manual muscle tests to >/= 4+/5 to improve dynamic hip/knee support for functional tasks.  3. Patient will demonstrate proper gait mechanics with full knee extension and no increase in pain.   4. Patient will demonstrate full squat to ground with 10# box lift with no increase in knee pain to show functional improvement.      PLAN     Continue with physical therapy as planned.     Plan of care Certification: 5/30/2022 to 7/30/2022.     Outpatient Physical Therapy 2 times weekly for 8 weeks to include the following interventions: Aquatic Therapy, Electrical Stimulation IFC, Russian, Gait Training, Manual Therapy, Moist Heat/ Ice, Neuromuscular Re-ed, Patient Education, Self Care, Therapeutic Activities, Therapeutic Exercise and Ultrasound, ASTYM, Kinesiotaping PRN, Functional Dry Needling       Heron Naylor, PT, DPT

## 2022-06-08 ENCOUNTER — CLINICAL SUPPORT (OUTPATIENT)
Dept: REHABILITATION | Facility: HOSPITAL | Age: 52
End: 2022-06-08
Payer: COMMERCIAL

## 2022-06-08 ENCOUNTER — OFFICE VISIT (OUTPATIENT)
Dept: ORTHOPEDICS | Facility: CLINIC | Age: 52
End: 2022-06-08
Payer: COMMERCIAL

## 2022-06-08 VITALS — WEIGHT: 213 LBS | BODY MASS INDEX: 34.23 KG/M2 | HEIGHT: 66 IN

## 2022-06-08 DIAGNOSIS — Z98.890 POST-OPERATIVE STATE: ICD-10-CM

## 2022-06-08 DIAGNOSIS — M25.562 ACUTE PAIN OF LEFT KNEE: Primary | ICD-10-CM

## 2022-06-08 DIAGNOSIS — M17.12 PRIMARY LOCALIZED OSTEOARTHRITIS OF LEFT KNEE: ICD-10-CM

## 2022-06-08 DIAGNOSIS — S83.242D ACUTE MEDIAL MENISCUS TEAR OF LEFT KNEE, SUBSEQUENT ENCOUNTER: Primary | ICD-10-CM

## 2022-06-08 DIAGNOSIS — M25.662 DECREASED RANGE OF MOTION OF LEFT KNEE: ICD-10-CM

## 2022-06-08 DIAGNOSIS — R29.898 WEAKNESS OF LEFT LOWER EXTREMITY: ICD-10-CM

## 2022-06-08 PROCEDURE — 3008F BODY MASS INDEX DOCD: CPT | Mod: CPTII,S$GLB,, | Performed by: PHYSICIAN ASSISTANT

## 2022-06-08 PROCEDURE — 1159F PR MEDICATION LIST DOCUMENTED IN MEDICAL RECORD: ICD-10-PCS | Mod: CPTII,S$GLB,, | Performed by: PHYSICIAN ASSISTANT

## 2022-06-08 PROCEDURE — 97110 THERAPEUTIC EXERCISES: CPT | Performed by: PHYSICAL THERAPIST

## 2022-06-08 PROCEDURE — 4010F ACE/ARB THERAPY RXD/TAKEN: CPT | Mod: CPTII,S$GLB,, | Performed by: PHYSICIAN ASSISTANT

## 2022-06-08 PROCEDURE — 99999 PR PBB SHADOW E&M-EST. PATIENT-LVL III: CPT | Mod: PBBFAC,,, | Performed by: PHYSICIAN ASSISTANT

## 2022-06-08 PROCEDURE — 3008F PR BODY MASS INDEX (BMI) DOCUMENTED: ICD-10-PCS | Mod: CPTII,S$GLB,, | Performed by: PHYSICIAN ASSISTANT

## 2022-06-08 PROCEDURE — 97140 MANUAL THERAPY 1/> REGIONS: CPT | Performed by: PHYSICAL THERAPIST

## 2022-06-08 PROCEDURE — 99024 PR POST-OP FOLLOW-UP VISIT: ICD-10-PCS | Mod: S$GLB,,, | Performed by: PHYSICIAN ASSISTANT

## 2022-06-08 PROCEDURE — 1160F RVW MEDS BY RX/DR IN RCRD: CPT | Mod: CPTII,S$GLB,, | Performed by: PHYSICIAN ASSISTANT

## 2022-06-08 PROCEDURE — 99024 POSTOP FOLLOW-UP VISIT: CPT | Mod: S$GLB,,, | Performed by: PHYSICIAN ASSISTANT

## 2022-06-08 PROCEDURE — 4010F PR ACE/ARB THEARPY RXD/TAKEN: ICD-10-PCS | Mod: CPTII,S$GLB,, | Performed by: PHYSICIAN ASSISTANT

## 2022-06-08 PROCEDURE — 1159F MED LIST DOCD IN RCRD: CPT | Mod: CPTII,S$GLB,, | Performed by: PHYSICIAN ASSISTANT

## 2022-06-08 PROCEDURE — 99999 PR PBB SHADOW E&M-EST. PATIENT-LVL III: ICD-10-PCS | Mod: PBBFAC,,, | Performed by: PHYSICIAN ASSISTANT

## 2022-06-08 PROCEDURE — 1160F PR REVIEW ALL MEDS BY PRESCRIBER/CLIN PHARMACIST DOCUMENTED: ICD-10-PCS | Mod: CPTII,S$GLB,, | Performed by: PHYSICIAN ASSISTANT

## 2022-06-08 NOTE — PROGRESS NOTES
Patient ID: Mable Redding  YOB: 1970  MRN: [id number]    Chief Complaint: Post-op Evaluation of the Left Knee    Referred By: Sana Richards NP    History of Present Illness: Mable Redding is a 51 y.o. 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 She states that she is doing well overall since surgery and is going to Physical Therapy at [facility] with Nnamdi. Denies any fevers, chills, night sweats, numbness and tingling.     HPI    Past Medical History:   Past Medical History:   Diagnosis Date    Anxiety     Bursitis     Right hip    COVID-19 11/3/2021    Depression     Digestive disorder     Herpes genitalia     History of blood clots     HPV (human papilloma virus) infection     HTN (hypertension) 2022    Lupus anticoagulant disorder     Lupus anticoagulant disorder 2019    Pulmonary embolus     Thyroid disease      Past Surgical History:   Procedure Laterality Date    ADENOIDECTOMY      ARTHROSCOPIC CHONDROPLASTY OF KNEE JOINT Left 2022    Procedure: ARTHROSCOPY, KNEE, WITH CHONDROPLASTY;  Surgeon: Rudy Dempsey MD;  Location: [facility];  Service: Orthopedics;  Laterality: Left;  Left knee chondroplasty of the left knee medial femoral condyle and medial tibial plateau    ARTHROSCOPY OF KNEE Left 2022    Procedure: ARTHROSCOPY, KNEE;  Surgeon: Rudy Dempsey MD;  Location: [facility] OR;  Service: Orthopedics;  Laterality: Left;     SECTION      DILATION AND CURETTAGE OF UTERUS      HYSTERECTOMY  2017    HYSTEROSCOPY      KNEE ARTHROSCOPY W/ MENISCECTOMY Left 2022    Procedure: ARTHROSCOPY, KNEE, WITH MENISCECTOMY;  Surgeon: Rudy Dempsey MD;  Location: [facility];  Service: Orthopedics;   Laterality: Left;  partial medial meniscectomy (approximately 60-70% left of the posterior horn) and lateral meniscectomy (small flap on anterior horn    LIPOMA RESECTION      OTHER SURGICAL HISTORY      Patient states she feel SOB in recovery and BP drops. Requests to be placed on side or sitting up.    ROBOTIC HYSTERECTOMY, WITH SALPINGECTOMY Bilateral     TONSILLECTOMY       Family History   Problem Relation Age of Onset    Hyperlipidemia Mother     Coronary artery disease Father     Hyperlipidemia Father     Hypertension Father     No Known Problems Sister     No Known Problems Maternal Aunt     No Known Problems Maternal Uncle     No Known Problems Paternal Aunt     No Known Problems Paternal Uncle     No Known Problems Maternal Grandmother     Diabetes type II Maternal Grandfather     Hypertension Paternal Grandmother     Hyperlipidemia Paternal Grandmother     Heart disease Paternal Grandmother     Lung cancer Paternal Grandmother     Lung cancer Paternal Grandfather     Amblyopia Neg Hx     Blindness Neg Hx     Cancer Neg Hx     Cataracts Neg Hx     Diabetes Neg Hx     Glaucoma Neg Hx     Macular degeneration Neg Hx     Retinal detachment Neg Hx     Strabismus Neg Hx     Stroke Neg Hx     Thyroid disease Neg Hx      Social History     Socioeconomic History    Marital status:    Tobacco Use    Smoking status: Former Smoker     Packs/day: 0.25     Years: 10.00     Pack years: 2.50     Quit date:      Years since quittin.4    Smokeless tobacco: Never Used   Substance and Sexual Activity    Alcohol use: Yes     Comment: Socially    Drug use: No    Sexual activity: Yes     Partners: Male     Birth control/protection: See Surgical Hx     Medication List with Changes/Refills   Current Medications    ALPRAZOLAM (XANAX) 0.25 MG TABLET    Take 0.25 mg by mouth nightly as needed.    CLOTRIMAZOLE-BETAMETHASONE 1-0.05% (LOTRISONE) CREAM    APPLY CREAM TO AFFECTED AREA  TWICE DAILY    DOCUSATE SODIUM (COLACE) 100 MG CAPSULE    300 mg.     DOCUSATE SODIUM (COLACE) 100 MG CAPSULE    Take 1 capsule (100 mg total) by mouth 2 (two) times daily.    ENOXAPARIN (LOVENOX) 30 MG/0.3 ML SYRG    Inject 0.3 mLs (30 mg total) into the skin Daily. Dispense 21 days of syringes for 21 days    EZETIMIBE (ZETIA) 10 MG TABLET    Take 10 mg by mouth every evening.    FINASTERIDE (PROSCAR) 5 MG TABLET    Take 5 mg by mouth every evening.    HYDROCHLOROTHIAZIDE (HYDRODIURIL) 25 MG TABLET    TAKE 1 TABLET(25 MG) BY MOUTH EVERY DAY    HYDROCODONE-ACETAMINOPHEN (NORCO) 5-325 MG PER TABLET    Take 1 tablet by mouth every 4 to 6 hours as needed for Pain.    HYDROCORTISONE (WESTCORT) 0.2 % CREAM    PERYC EXT AA BID    HYDROXYCHLOROQUINE (PLAQUENIL) 200 MG TABLET    Take by mouth.    IVERMECTIN (STROMECTOL) 3 MG TAB    SMARTSI Tablet(s) By Mouth Once    LORATADINE (CLARITIN) 10 MG TABLET    Take 10 mg by mouth daily as needed.    LOSARTAN (COZAAR) 100 MG TABLET    Take 100 mg by mouth nightly.    METOPROLOL SUCCINATE (TOPROL-XL) 25 MG 24 HR TABLET    Take 1 tablet (25 mg total) by mouth once daily.    MUPIROCIN (BACTROBAN) 2 % OINTMENT    Apply to nostrils 2 (two) times daily.    NP THYROID 90 MG TAB    150 mg before breakfast.    ONDANSETRON (ZOFRAN) 4 MG TABLET    Take 1 tablet (4 mg total) by mouth every 8 (eight) hours as needed for Nausea.    PANTOPRAZOLE (PROTONIX) 40 MG TABLET    Take 40 mg by mouth every evening.    ROSUVASTATIN (CRESTOR) 40 MG TAB    20 mg every evening.    VENLAFAXINE (EFFEXOR XR) 150 MG CP24    Take 1 capsule (150 mg total) by mouth once daily.    VITAMIN D 1000 UNITS TAB    Take 5,000 Units by mouth once daily.      Review of patient's allergies indicates:   Allergen Reactions    Aldactone [spironolactone] Swelling    Remdesivir Hives    Sulfa (sulfonamide antibiotics) Edema     Review of Systems   Constitutional: Negative for chills and fever.   HENT: Negative for sore throat.     Eyes: Negative for pain.   Cardiovascular: Negative for chest pain and leg swelling.   Respiratory: Negative for cough and shortness of breath.    Skin: Negative for itching and rash.   Musculoskeletal: Positive for joint pain and joint swelling.   Gastrointestinal: Negative for abdominal pain, nausea and vomiting.   Genitourinary: Negative for dysuria.   Neurological: Negative for dizziness, numbness and paresthesias.       Physical Exam:   Body mass index is 34.38 kg/m².  There were no vitals filed for this visit.   GENERAL: Well appearing, appropriate for stated age, no acute distress.  CARDIOVASCULAR: Pulses regular by peripheral palpation.  PULMONARY: Respirations are even and non-labored.  NEURO: Awake, alert, and oriented x 3.  PSYCH: Mood & affect are appropriate.  HEENT: Head is normocephalic and atraumatic.  General    Nursing note and vitals reviewed.          Right Knee Exam     Other   Sensation: normal    Comments:  Suture removed   No s/s of infection  No active drainage  No active bleeding  Quad firing    Muscle Strength   Right Lower Extremity   Hip Abduction: 5/5   Quadriceps:  4/5   Hamstrin/5     Vascular Exam     Right Pulses  Dorsalis Pedis:      2+  Posterior Tibial:      2+          All compartments are soft and compressible. Calf soft non-tender. Intact EHL, FHL, gastroc soleus, and tibialis anterior. Sensation intact to light touch in superficial peroneal, deep peroneal, tibial, sural, and saphenous nerve distributions. Foot warm and well perfused with capillary refill of less than 2 seconds and palpable pedal pulses.       Imaging:  X-ray Knee Ortho Left  Order date: 2021  Authorizing: Carlene Rubin NP  Ordered by Carlene Rubin NP on 2021.       Narrative & Impression    EXAMINATION:  XR KNEE ORTHO LEFT     CLINICAL HISTORY:  Pain in left knee     TECHNIQUE:  AP standing of both knees, Merchant views of both knees as well as a lateral view of the left knee were  performed.     COMPARISON:  None     FINDINGS:  There is no evidence fracture or malalignment.  The adjacent soft tissues are unremarkable.  There is a small left knee joint effusion.     Impression:     There is a small left knee joint effusion.        Electronically signed by: Alysia Howe MD  Date:                                            08/02/2021  Time:                                           12:07       Relevant imaging results reviewed and interpreted by me, discussed with the patient and / or family today.     Other Tests:         Patient Instructions   Assessment:  Mable Redding is a 51 y.o. female   Nurse Practitioner with a chief complaint of Post-op Evaluation of the Left Knee         No diagnosis found.     Plan:   Recheck in 4 weeks with Dr. Rudy Dempsey     Follow-up:  or sooner if there are any problems between now and then.    Leave Review:    Google: Leave Google Review   Healthgrades: [email or web address]     After Hours Number: [phone]        Provider Note/Medical Decision Making:   Review hospital photos, discussed post-operative recovery.  I had a long discussion with the patient about the causes, treatments, and prognosis for knee arthritis. We discussed that although there is not a cure for arthritis, there are effective ways to improve symptoms. I recommended low impact activities such as elliptical and bicycle. I talked about the fact that aquatic and pool therapy is often helpful. I advised the patient to consider good quality stability and cushioned shoes. We talked about the importance of knee motion in the health of the knee. The patient can also use a compression knee sleeve to help limit swelling and provide proprioceptive feedback. We recommend formal physical therapy or at minimum a home exercise program, which we discussed with the patient. I advised that over the counter solutions such as glucosamine and  chondroitin may help with symptoms.      I discussed worrisome and red flag signs and symptoms with the patient. The patient expressed understanding and agreed to alert me immediately or to go to the emergency room if they experience any of these.    Treatment plan was developed with input from the patient/family, and they expressed understanding and agreement with the plan. All questions were answered today.    Disclaimer: This note was prepared using a voice recognition system and is likely to have sound alike errors within the text.

## 2022-06-08 NOTE — PATIENT INSTRUCTIONS
Assessment:  Mable Redding is a 51 y.o. 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      Encounter Diagnoses   Name Primary?    Acute medial meniscus tear of left knee, subsequent encounter Yes    Primary localized osteoarthritis of left knee         Plan:  Recheck in 4 weeks with Dr. Rudy Dempsey   Discussed possible visco in the future     Follow-up: 4 weeks Dr. Rudy Dempsey or sooner if there are any problems between now and then.    Leave Review:   Google: Leave Google Review  Healthgrades: [email or web address]     After Hours Number: [phone]

## 2022-06-10 ENCOUNTER — CLINICAL SUPPORT (OUTPATIENT)
Dept: REHABILITATION | Facility: HOSPITAL | Age: 52
End: 2022-06-10
Payer: COMMERCIAL

## 2022-06-10 DIAGNOSIS — R29.898 WEAKNESS OF LEFT LOWER EXTREMITY: ICD-10-CM

## 2022-06-10 DIAGNOSIS — M25.662 DECREASED RANGE OF MOTION OF LEFT KNEE: ICD-10-CM

## 2022-06-10 DIAGNOSIS — M25.562 ACUTE PAIN OF LEFT KNEE: Primary | ICD-10-CM

## 2022-06-10 PROCEDURE — 97140 MANUAL THERAPY 1/> REGIONS: CPT

## 2022-06-10 PROCEDURE — 97110 THERAPEUTIC EXERCISES: CPT

## 2022-06-10 NOTE — PROGRESS NOTES
"[facility]   Physical Therapy Treatment Note     Name: Mable Jordanden  Clinic Number: [id number]    Therapy Diagnosis:   Encounter Diagnoses   Name Primary?    Acute pain of left knee Yes    Decreased range of motion of left knee     Weakness of left lower extremity      Physician: Rudy Dempsey MD    Visit Date: 6/10/2022    Physician Orders: PT Eval and Treat   Medical Diagnosis from Referral: S83.242D (ICD-10-CM) - Acute medial meniscus tear of left knee, subsequent encounter  Evaluation Date: 5/30/2022  Authorization Period Expiration: 7/1/2022  Plan of Care Expiration: 7/30/2022  Visit # / Visits authorized: 5/20 (+eval)  FOTO: 1/3  PTA Visit #: 0/5     Time In: 2:35 pm  Time Out: 3:20 pm  Total Billable Time: 45 minutes    Precautions: Standard      Surgery: Left knee Arthroscopy on 5/27/2022. WBAT. ROM AT.     SUBJECTIVE     Pt reports: knee is doing well and swelling is still present but decreasing.     She was compliant with home exercise program.    Response to previous treatment: increased knee ROM.    Functional change: improved community ambulation.     Pain: 3/10  Location: left knee      OBJECTIVE     Objective Measures updated at progress report unless specified.     TREATMENT     Mable received the treatments listed below:      Mable received therapeutic exercises to develop strength, endurance, ROM, flexibility, posture and core stabilization for 25 minutes including:    Recumbent Bike 5 minutes   Straight leg Raises 20x   Prone TKE 5" hold, 20x  Quad sets 10" x 10 with towel roll under heel  SAQ 3" hold, 20x, 3#  Supine Hip Adduction with ball 20x  Clamshells 20x  LAQ with 2" hold, 20x  Standing TKE 5" hold, 20x, purple sc  Standing gastroc stretch 30" hold, 3x  Shuttle DL 6 bands 25x  Shuttle SL 4 bands 20x  Step Ups (on stairs) 2x10   Step Downs 2" 2x8   Heel slides 20x    Mable received the following manual therapy techniques: Joint mobilizations, " Myofacial release and Soft tissue Mobilization were applied to the: left knee for 15 minutes, including:    ASTYM to left knee, anterior and posterior  PF mobilizations  Grade I/II flexion and extension mobilizations.     Mable participated in neuromuscular re-education activities to improve: Balance, Coordination, Kinesthetic, Sense, Proprioception and Posture for 0 minutes. The following activities were included:      Mable participated in dynamic functional therapeutic activities to improve functional performance for 0  minutes, including:      Patient Education and Home Exercises     Education provided:   - HEP provided  - Home modalities prn  - Education on return to work in 2 weeks      Written Home Exercises Provided: yes.  Exercises were reviewed and Mable was able to demonstrate them prior to the end of the session.  Mable demonstrated good  understanding of the education provided.      See EMR under Patient Instructions for exercises provided on evaluation.    ASSESSMENT   Patient demonstrated improved knee ROM after Manual Therapy. Progressed Therapeutic Exercise by adding Supine Hip adduction with ball and Clamshells to improve lower extremity strength.      Mable Is progressing well towards her goals.   Pt prognosis is Excellent.     Pt will continue to benefit from skilled outpatient physical therapy to address the deficits listed in the problem list box on initial evaluation, provide pt/family education and to maximize pt's level of independence in the home and community environment.     Pt's spiritual, cultural and educational needs considered and pt agreeable to plan of care and goals.     Anticipated barriers to physical therapy: None    Goals:  Short Term Goals (4 Weeks):  1. Patient will be compliant with home exercise program to supplement therapy in restoring pain free function.  2. Patient will improve impaired lower extremity manual muscle tests to >/= 4/5 to improve dynamic hip/knee  support for functional tasks.  3. Patient will demonstrate Full symmetrical ROM compared to RLE to improve gait mechanics     Long Term Goals (8 Weeks):  1. Patient will improve FOTO score to </= 25% limited to decrease perceived limitation with mobility.   2. Patient will improve impaired lower extremity manual muscle tests to >/= 4+/5 to improve dynamic hip/knee support for functional tasks.  3. Patient will demonstrate proper gait mechanics with full knee extension and no increase in pain.   4. Patient will demonstrate full squat to ground with 10# box lift with no increase in knee pain to show functional improvement.      PLAN     Continue with physical therapy as planned.     Plan of care Certification: 5/30/2022 to 7/30/2022.     Outpatient Physical Therapy 2 times weekly for 8 weeks to include the following interventions: Aquatic Therapy, Electrical Stimulation IFC, Russian, Gait Training, Manual Therapy, Moist Heat/ Ice, Neuromuscular Re-ed, Patient Education, Self Care, Therapeutic Activities, Therapeutic Exercise and Ultrasound, ASTYM, Kinesiotaping PRN, Functional Dry Needling       Heron Naylor, PT, DPT

## 2022-06-14 ENCOUNTER — PATIENT MESSAGE (OUTPATIENT)
Dept: REHABILITATION | Facility: HOSPITAL | Age: 52
End: 2022-06-14

## 2022-06-14 ENCOUNTER — CLINICAL SUPPORT (OUTPATIENT)
Dept: REHABILITATION | Facility: HOSPITAL | Age: 52
End: 2022-06-14
Payer: COMMERCIAL

## 2022-06-14 DIAGNOSIS — R29.898 WEAKNESS OF LEFT LOWER EXTREMITY: ICD-10-CM

## 2022-06-14 DIAGNOSIS — M25.662 DECREASED RANGE OF MOTION OF LEFT KNEE: ICD-10-CM

## 2022-06-14 DIAGNOSIS — M25.562 ACUTE PAIN OF LEFT KNEE: Primary | ICD-10-CM

## 2022-06-14 PROCEDURE — 97110 THERAPEUTIC EXERCISES: CPT

## 2022-06-14 PROCEDURE — 97140 MANUAL THERAPY 1/> REGIONS: CPT

## 2022-06-14 NOTE — PROGRESS NOTES
"[facility]   Physical Therapy Treatment Note     Name: Mable Redding  Clinic Number: [id number]    Therapy Diagnosis:   Encounter Diagnoses   Name Primary?    Acute pain of left knee Yes    Decreased range of motion of left knee     Weakness of left lower extremity      Physician: Rudy Dempsey MD    Visit Date: 6/14/2022    Physician Orders: PT Eval and Treat   Medical Diagnosis from Referral: S83.242D (ICD-10-CM) - Acute medial meniscus tear of left knee, subsequent encounter  Evaluation Date: 5/30/2022  Authorization Period Expiration: 7/1/2022  Plan of Care Expiration: 7/30/2022  Visit # / Visits authorized: 6/20 (+eval)  FOTO: 1/3  PTA Visit #: 0/5     Time In: 7:05 am  Time Out: 7:55 am  Total Billable Time: 50 minutes    Precautions: Standard      Surgery: Left knee Arthroscopy on 5/27/2022. WBAT. ROM AT.     SUBJECTIVE     Pt reports: knee is doing well and swelling is still present but decreasing.     She was compliant with home exercise program.    Response to previous treatment: increased knee ROM.    Functional change: improved community ambulation.     Pain: 3/10  Location: left knee      OBJECTIVE     Objective Measures updated at progress report unless specified.     TREATMENT     Mable received the treatments listed below:      Mable received therapeutic exercises to develop strength, endurance, ROM, flexibility, posture and core stabilization for 40 minutes including:    Recumbent Bike 5 minutes   Straight leg Raises 20x   Quad sets 10" x 10 with towel roll under heel  Supine Hip Adduction with ball 30x  Clamshells 20x  LAQ with 2" hold, 20x  Standing TKE 5" hold, 20x, purple sc  Shuttle DL 6 bands 25x  Shuttle SL 4 bands 20x  Step Ups 4" box 2x10    Heel slides 30x    Held:  Step Downs 2" 2x8  Prone TKE 5" hold, 20x  SAQ 3" hold, 20x, 3#  Standing gastroc stretch 30" hold, 3x    Mable received the following manual therapy techniques: Joint " mobilizations, Myofacial release and Soft tissue Mobilization were applied to the: left knee for 8 minutes, including:    ASTYM to left knee, anterior and posterior  PF mobilizations  Grade I/II flexion and extension mobilizations.     Mable participated in neuromuscular re-education activities to improve: Balance, Coordination, Kinesthetic, Sense, Proprioception and Posture for 0 minutes. The following activities were included:      Mable participated in dynamic functional therapeutic activities to improve functional performance for 0  minutes, including:      Patient Education and Home Exercises     Education provided:   - HEP provided  - Home modalities prn  - Education on return to work in 2 weeks      Written Home Exercises Provided: yes.  Exercises were reviewed and Mable was able to demonstrate them prior to the end of the session.  Mable demonstrated good  understanding of the education provided.      See EMR under Patient Instructions for exercises provided on evaluation.    ASSESSMENT   Patient demonstrated improved knee extension ROM after Manual Therapy. Progressed Therapeutic Exercise by increasing repititions of Heel Slides and Supine Hip Adduction to improve knee ROM and lower extremity strength.     Mable Is progressing well towards her goals.   Pt prognosis is Excellent.     Pt will continue to benefit from skilled outpatient physical therapy to address the deficits listed in the problem list box on initial evaluation, provide pt/family education and to maximize pt's level of independence in the home and community environment.     Pt's spiritual, cultural and educational needs considered and pt agreeable to plan of care and goals.     Anticipated barriers to physical therapy: None    Goals:  Short Term Goals (4 Weeks):  1. Patient will be compliant with home exercise program to supplement therapy in restoring pain free function.  2. Patient will improve impaired lower extremity manual muscle  tests to >/= 4/5 to improve dynamic hip/knee support for functional tasks.  3. Patient will demonstrate Full symmetrical ROM compared to RLE to improve gait mechanics     Long Term Goals (8 Weeks):  1. Patient will improve FOTO score to </= 25% limited to decrease perceived limitation with mobility.   2. Patient will improve impaired lower extremity manual muscle tests to >/= 4+/5 to improve dynamic hip/knee support for functional tasks.  3. Patient will demonstrate proper gait mechanics with full knee extension and no increase in pain.   4. Patient will demonstrate full squat to ground with 10# box lift with no increase in knee pain to show functional improvement.      PLAN     Continue with physical therapy as planned.     Plan of care Certification: 5/30/2022 to 7/30/2022.     Outpatient Physical Therapy 2 times weekly for 8 weeks to include the following interventions: Aquatic Therapy, Electrical Stimulation IFC, Russian, Gait Training, Manual Therapy, Moist Heat/ Ice, Neuromuscular Re-ed, Patient Education, Self Care, Therapeutic Activities, Therapeutic Exercise and Ultrasound, ASTYM, Kinesiotaping PRN, Functional Dry Needling       Heron Naylor, PT, DPT

## 2022-06-16 ENCOUNTER — CLINICAL SUPPORT (OUTPATIENT)
Dept: REHABILITATION | Facility: HOSPITAL | Age: 52
End: 2022-06-16
Payer: COMMERCIAL

## 2022-06-16 DIAGNOSIS — M25.662 DECREASED RANGE OF MOTION OF LEFT KNEE: ICD-10-CM

## 2022-06-16 DIAGNOSIS — R29.898 WEAKNESS OF LEFT LOWER EXTREMITY: ICD-10-CM

## 2022-06-16 DIAGNOSIS — M25.562 ACUTE PAIN OF LEFT KNEE: Primary | ICD-10-CM

## 2022-06-16 PROCEDURE — 97110 THERAPEUTIC EXERCISES: CPT | Performed by: PHYSICAL THERAPIST

## 2022-06-16 NOTE — PROGRESS NOTES
"[facility]   Physical Therapy Treatment Note     Name: Mable Redding  Clinic Number: [id number]    Therapy Diagnosis:   Encounter Diagnoses   Name Primary?    Acute pain of left knee Yes    Decreased range of motion of left knee     Weakness of left lower extremity      Physician: Rudy Dempsey MD    Visit Date: 6/16/2022    Physician Orders: PT Eval and Treat   Medical Diagnosis from Referral: S83.242D (ICD-10-CM) - Acute medial meniscus tear of left knee, subsequent encounter  Evaluation Date: 5/30/2022  Authorization Period Expiration: 7/1/2022  Plan of Care Expiration: 7/30/2022  Visit # / Visits authorized: 7/20 (+eval)  FOTO: 1/3  PTA Visit #: 0/5     Time In: 10:35 am  Time Out: 11:28 am  Total Billable Time: 53 minutes    Precautions: Standard      Surgery: Left knee Arthroscopy on 5/27/2022. WBAT. ROM AT.     SUBJECTIVE     Pt reports: knee is doing well. Overall ROM, swelling, and walking is better. Stopped icing her knee a couple of days ago and no longer aches.    She was compliant with home exercise program.    Response to previous treatment: increased knee ROM.    Functional change: improved community ambulation.     Pain: 2/10  Location: left knee      OBJECTIVE     Objective Measures updated at progress report unless specified.     TREATMENT     Mable received the treatments listed below:      Mable received therapeutic exercises to develop strength, endurance, ROM, flexibility, posture and core stabilization for 53 minutes including:    Recumbent Bike 5 minutes   Straight leg Raises 20x   Quad sets 10" x 10 with towel roll under heel  Supine Hip Adduction with ball 30x  Clamshells 2x15 RTB   LAQ with 2" hold, 2x10, 3#   Standing TKE 5" hold, 20x, purple sport cord  Shuttle DL 6 bands 15x, 7 bands 12x, 8 bands 10x  Shuttle SL 4 bands 2x15  Step Ups 4" box 2x10    Heel slides 30x  Step Downs 2" 2x6     Held:  Prone TKE 5" hold, 20x  SAQ 3" hold, 20x, " "3#  Standing gastroc stretch 30" hold, 3x    Mable received the following manual therapy techniques: Joint mobilizations, Myofacial release and Soft tissue Mobilization were applied to the: left knee for 0 minutes, including:    ASTYM to left knee, anterior and posterior  PF mobilizations  Grade I/II flexion and extension mobilizations.     Mable participated in neuromuscular re-education activities to improve: Balance, Coordination, Kinesthetic, Sense, Proprioception and Posture for 0 minutes. The following activities were included:      Mable participated in dynamic functional therapeutic activities to improve functional performance for 0  minutes, including:      Patient Education and Home Exercises     Education provided:   - HEP provided  - Home modalities prn  - Education on return to work in 2 weeks      Written Home Exercises Provided: yes.  Exercises were reviewed and Mable was able to demonstrate them prior to the end of the session.  Mable demonstrated good  understanding of the education provided.      See EMR under Patient Instructions for exercises provided on evaluation.    ASSESSMENT     Pt demonstrated improved ROM with therapeutic exercise (heel slides and quad sets). Progressed therapeutic exercises by increasing weights and decreasing repetitions to continue improve lower extremity strength. Overall, patient tolerated session well.      Mable Is progressing well towards her goals.   Pt prognosis is Excellent.     Pt will continue to benefit from skilled outpatient physical therapy to address the deficits listed in the problem list box on initial evaluation, provide pt/family education and to maximize pt's level of independence in the home and community environment.     Pt's spiritual, cultural and educational needs considered and pt agreeable to plan of care and goals.     Anticipated barriers to physical therapy: None    Goals:  Short Term Goals (4 Weeks):  1. Patient will be compliant " with home exercise program to supplement therapy in restoring pain free function.  2. Patient will improve impaired lower extremity manual muscle tests to >/= 4/5 to improve dynamic hip/knee support for functional tasks.  3. Patient will demonstrate Full symmetrical ROM compared to RLE to improve gait mechanics     Long Term Goals (8 Weeks):  1. Patient will improve FOTO score to </= 25% limited to decrease perceived limitation with mobility.   2. Patient will improve impaired lower extremity manual muscle tests to >/= 4+/5 to improve dynamic hip/knee support for functional tasks.  3. Patient will demonstrate proper gait mechanics with full knee extension and no increase in pain.   4. Patient will demonstrate full squat to ground with 10# box lift with no increase in knee pain to show functional improvement.      PLAN     Continue with physical therapy as planned.     Plan of care Certification: 5/30/2022 to 7/30/2022.     Outpatient Physical Therapy 2 times weekly for 8 weeks to include the following interventions: Aquatic Therapy, Electrical Stimulation IFC, Russian, Gait Training, Manual Therapy, Moist Heat/ Ice, Neuromuscular Re-ed, Patient Education, Self Care, Therapeutic Activities, Therapeutic Exercise and Ultrasound, ASTYM, Kinesiotaping PRN, Functional Dry Needling       Nnamdi Boucher, PT, DPT

## 2022-06-21 ENCOUNTER — CLINICAL SUPPORT (OUTPATIENT)
Dept: REHABILITATION | Facility: HOSPITAL | Age: 52
End: 2022-06-21
Payer: COMMERCIAL

## 2022-06-21 DIAGNOSIS — R29.898 WEAKNESS OF LEFT LOWER EXTREMITY: ICD-10-CM

## 2022-06-21 DIAGNOSIS — M25.662 DECREASED RANGE OF MOTION OF LEFT KNEE: ICD-10-CM

## 2022-06-21 DIAGNOSIS — M25.562 ACUTE PAIN OF LEFT KNEE: Primary | ICD-10-CM

## 2022-06-21 PROCEDURE — 97110 THERAPEUTIC EXERCISES: CPT | Performed by: PHYSICAL THERAPIST

## 2022-06-21 NOTE — PROGRESS NOTES
"[facility]   Physical Therapy Treatment Note     Name: Mable Redding  Clinic Number: [id number]    Therapy Diagnosis:   Encounter Diagnoses   Name Primary?    Acute pain of left knee Yes    Decreased range of motion of left knee     Weakness of left lower extremity      Physician: Rudy Dempsey MD    Visit Date: 6/21/2022    Physician Orders: PT Eval and Treat   Medical Diagnosis from Referral: S83.242D (ICD-10-CM) - Acute medial meniscus tear of left knee, subsequent encounter  Evaluation Date: 5/30/2022  Authorization Period Expiration: 7/1/2022  Plan of Care Expiration: 7/30/2022  Visit # / Visits authorized: 8/20 (+eval)  FOTO: 1/3  PTA Visit #: 0/5     Time In: 3:30 pm  Time Out: 430  Total Billable Time: 55 minutes    Precautions: Standard      Surgery: Left knee Arthroscopy on 5/27/2022. WBAT. ROM AT.     SUBJECTIVE     Pt reports: knee is a little swollen due from performing a lot of yard work today but overall doing great.     She was compliant with home exercise program.    Response to previous treatment: increased knee ROM.    Functional change: improved community ambulation.     Pain: 2/10  Location: left knee      OBJECTIVE     Objective Measures updated at progress report unless specified.     TREATMENT     Mable received the treatments listed below:      Mable received therapeutic exercises to develop strength, endurance, ROM, flexibility, posture and core stabilization for 55 minutes including:    Recumbent Bike 5 minutes   Straight leg Raises 20x   Quad sets 10" x 10 with towel roll under heel  Supine Hip Adduction with ball 30x  Clamshells 2x15 RTB   LAQ with 2" hold, 3x10, 3#   Standing Hip Abduction, Extension, Flexion 2x10, B, red band   Standing TKE 5" hold, 20x, purple sport cord  Shuttle DL 7 bands 12x, 8 bands 2x10  Shuttle SL 6 bands 2x10   Step Ups 4" box 2x10    Heel slides 5" hold, 30x  Box Squats 24" 2x10, Staggered   SL RDLs to 24" box, 20x " "B   Step Downs 2" 2x6     Held:  Prone TKE 5" hold, 20x  SAQ 3" hold, 20x, 3#  Standing gastroc stretch 30" hold, 3x    Mable received the following manual therapy techniques: Joint mobilizations, Myofacial release and Soft tissue Mobilization were applied to the: left knee for 0 minutes, including:    ASTYM to left knee, anterior and posterior  PF mobilizations  Grade I/II flexion and extension mobilizations.     Mable participated in neuromuscular re-education activities to improve: Balance, Coordination, Kinesthetic, Sense, Proprioception and Posture for 0 minutes. The following activities were included:      Mable participated in dynamic functional therapeutic activities to improve functional performance for 0  minutes, including:      Patient Education and Home Exercises     Education provided:   - HEP provided  - Home modalities prn  - Education on return to work in 2 weeks      Written Home Exercises Provided: yes.  Exercises were reviewed and Mable was able to demonstrate them prior to the end of the session.  Mable demonstrated good  understanding of the education provided.      See EMR under Patient Instructions for exercises provided on evaluation.    ASSESSMENT     Mable is progressing well with left knee ROM and strength. Therapeutic exercises were progressed by increasing load (DL and SL shuttle) and decreasing repetitions. Bodyweight squats, single leg RDLs, and standing hip exercises were added with minimal complaints of discomfort.     Mable Is progressing well towards her goals.   Pt prognosis is Excellent.     Pt will continue to benefit from skilled outpatient physical therapy to address the deficits listed in the problem list box on initial evaluation, provide pt/family education and to maximize pt's level of independence in the home and community environment.     Pt's spiritual, cultural and educational needs considered and pt agreeable to plan of care and goals.     Anticipated barriers " to physical therapy: None    Goals:  Short Term Goals (4 Weeks):  1. Patient will be compliant with home exercise program to supplement therapy in restoring pain free function.  2. Patient will improve impaired lower extremity manual muscle tests to >/= 4/5 to improve dynamic hip/knee support for functional tasks.  3. Patient will demonstrate Full symmetrical ROM compared to RLE to improve gait mechanics     Long Term Goals (8 Weeks):  1. Patient will improve FOTO score to </= 25% limited to decrease perceived limitation with mobility.   2. Patient will improve impaired lower extremity manual muscle tests to >/= 4+/5 to improve dynamic hip/knee support for functional tasks.  3. Patient will demonstrate proper gait mechanics with full knee extension and no increase in pain.   4. Patient will demonstrate full squat to ground with 10# box lift with no increase in knee pain to show functional improvement.      PLAN     Continue with physical therapy as planned.     Plan of care Certification: 5/30/2022 to 7/30/2022.     Outpatient Physical Therapy 2 times weekly for 8 weeks to include the following interventions: Aquatic Therapy, Electrical Stimulation IFC, Russian, Gait Training, Manual Therapy, Moist Heat/ Ice, Neuromuscular Re-ed, Patient Education, Self Care, Therapeutic Activities, Therapeutic Exercise and Ultrasound, ASTYM, Kinesiotaping PRN, Functional Dry Needling       Nnamdi Boucher, PT, DPT

## 2022-06-23 ENCOUNTER — CLINICAL SUPPORT (OUTPATIENT)
Dept: REHABILITATION | Facility: HOSPITAL | Age: 52
End: 2022-06-23
Payer: COMMERCIAL

## 2022-06-23 DIAGNOSIS — R29.898 WEAKNESS OF LEFT LOWER EXTREMITY: ICD-10-CM

## 2022-06-23 DIAGNOSIS — M25.562 ACUTE PAIN OF LEFT KNEE: Primary | ICD-10-CM

## 2022-06-23 DIAGNOSIS — M25.662 DECREASED RANGE OF MOTION OF LEFT KNEE: ICD-10-CM

## 2022-06-23 PROCEDURE — 97110 THERAPEUTIC EXERCISES: CPT | Performed by: PHYSICAL THERAPIST

## 2022-06-23 PROCEDURE — 97112 NEUROMUSCULAR REEDUCATION: CPT | Performed by: PHYSICAL THERAPIST

## 2022-06-23 NOTE — PROGRESS NOTES
"[facility]   Physical Therapy Treatment Note     Name: Mable Redding  Clinic Number: [id number]    Therapy Diagnosis:   Encounter Diagnoses   Name Primary?    Acute pain of left knee Yes    Decreased range of motion of left knee     Weakness of left lower extremity      Physician: Rudy Dempsey MD    Visit Date: 6/23/2022    Physician Orders: PT Eval and Treat   Medical Diagnosis from Referral: S83.242D (ICD-10-CM) - Acute medial meniscus tear of left knee, subsequent encounter  Evaluation Date: 5/30/2022  Authorization Period Expiration: 7/1/2022  Plan of Care Expiration: 7/30/2022  Visit # / Visits authorized: 8/20 (+eval)  FOTO: 1/3  PTA Visit #: 0/5     Time In: 5:25 pm  Time Out: 615 pm  Total Billable Time: 50 minutes    Precautions: Standard      Surgery: Left knee Arthroscopy on 5/27/2022. WBAT. ROM AT.     SUBJECTIVE     Pt reports: feels knee is getting stronger, improving with each week.     She was compliant with home exercise program.    Response to previous treatment: increased knee ROM.    Functional change: improved community ambulation.     Pain: 2/10  Location: left knee      OBJECTIVE     Objective Measures updated at progress report unless specified.     TREATMENT     Mable received the treatments listed below:      Mable received therapeutic exercises to develop strength, endurance, ROM, flexibility, posture and core stabilization for 40 minutes including:    Recumbent Bike 5 minutes   Lateral squat walk RTB 3 laps  Monster walks RTB 3 laps  Standing clamshells 30x RTB  Single leg leg press: 4 plates 15x, 5 plates 12x, 6 plates 8x, 7 plates 2x3  Standing TKE pink SC: 5" hold, 15x  Seated knee extension machine, 1 plate, eccentric 15x  Step Ups 6" box 3x8  Step downs 4" box 3x5  Box Squats 20" 2x10, Staggered 10#KB  Standing gastroc stretch 30" x 3    Mable received the following manual therapy techniques: Joint mobilizations, Myofacial release and " "Soft tissue Mobilization were applied to the: left knee for 0 minutes, including:    ASTYM to left knee, anterior and posterior  PF mobilizations  Grade I/II flexion and extension mobilizations.     Mable participated in neuromuscular re-education activities to improve: Balance, Coordination, Kinesthetic, Sense, Proprioception and Posture for 10 minutes. The following activities were included:  SLS - star drill: 2 sets on ground, 1 set on Bosu 5 reps - 3 cone  SL RDLs to 12" box, 20x B 10#KB    Mable participated in dynamic functional therapeutic activities to improve functional performance for 0  minutes, including:      Patient Education and Home Exercises     Education provided:   - HEP provided  - Home modalities prn  - Education on return to work in 2 weeks      Written Home Exercises Provided: yes.  Exercises were reviewed and Mable was able to demonstrate them prior to the end of the session.  Mable demonstrated good  understanding of the education provided.      See EMR under Patient Instructions for exercises provided on evaluation.    ASSESSMENT     Pt presents with improved strength overall along with improved stability of knee joint. Pt does have fatigue with therex, especially quadriceps strengthening. Pt will benefit from further balance activity along with hip and quad strength to stabilize knee in functional activity.       Mable Is progressing well towards her goals.   Pt prognosis is Excellent.     Pt will continue to benefit from skilled outpatient physical therapy to address the deficits listed in the problem list box on initial evaluation, provide pt/family education and to maximize pt's level of independence in the home and community environment.     Pt's spiritual, cultural and educational needs considered and pt agreeable to plan of care and goals.     Anticipated barriers to physical therapy: None    Goals:  Short Term Goals (4 Weeks):  1. Patient will be compliant with home exercise " program to supplement therapy in restoring pain free function.  2. Patient will improve impaired lower extremity manual muscle tests to >/= 4/5 to improve dynamic hip/knee support for functional tasks.  3. Patient will demonstrate Full symmetrical ROM compared to RLE to improve gait mechanics     Long Term Goals (8 Weeks):  1. Patient will improve FOTO score to </= 25% limited to decrease perceived limitation with mobility.   2. Patient will improve impaired lower extremity manual muscle tests to >/= 4+/5 to improve dynamic hip/knee support for functional tasks.  3. Patient will demonstrate proper gait mechanics with full knee extension and no increase in pain.   4. Patient will demonstrate full squat to ground with 10# box lift with no increase in knee pain to show functional improvement.      PLAN     Continue with physical therapy as planned.     Plan of care Certification: 5/30/2022 to 7/30/2022.     Outpatient Physical Therapy 2 times weekly for 8 weeks to include the following interventions: Aquatic Therapy, Electrical Stimulation IFC, Russian, Gait Training, Manual Therapy, Moist Heat/ Ice, Neuromuscular Re-ed, Patient Education, Self Care, Therapeutic Activities, Therapeutic Exercise and Ultrasound, ASTYM, Kinesiotaping PRN, Functional Dry Needling       Nnamdi Boucher, PT, DPT

## 2022-07-05 ENCOUNTER — OFFICE VISIT (OUTPATIENT)
Dept: CARDIOLOGY | Facility: CLINIC | Age: 52
End: 2022-07-05
Payer: COMMERCIAL

## 2022-07-05 ENCOUNTER — CLINICAL SUPPORT (OUTPATIENT)
Dept: REHABILITATION | Facility: HOSPITAL | Age: 52
End: 2022-07-05
Payer: COMMERCIAL

## 2022-07-05 VITALS
DIASTOLIC BLOOD PRESSURE: 64 MMHG | HEIGHT: 66 IN | WEIGHT: 209.19 LBS | OXYGEN SATURATION: 98 % | BODY MASS INDEX: 33.62 KG/M2 | HEART RATE: 79 BPM | SYSTOLIC BLOOD PRESSURE: 110 MMHG

## 2022-07-05 DIAGNOSIS — Z82.49 FAMILY HISTORY OF PREMATURE CAD: ICD-10-CM

## 2022-07-05 DIAGNOSIS — R93.1 HIGH CORONARY ARTERY CALCIUM SCORE: ICD-10-CM

## 2022-07-05 DIAGNOSIS — Z79.01 LONG TERM (CURRENT) USE OF ANTICOAGULANTS: ICD-10-CM

## 2022-07-05 DIAGNOSIS — R00.2 PALPITATIONS: ICD-10-CM

## 2022-07-05 DIAGNOSIS — M25.562 ACUTE PAIN OF LEFT KNEE: Primary | ICD-10-CM

## 2022-07-05 DIAGNOSIS — R06.02 SOB (SHORTNESS OF BREATH): ICD-10-CM

## 2022-07-05 DIAGNOSIS — U07.1 COVID-19: ICD-10-CM

## 2022-07-05 DIAGNOSIS — D68.62 LUPUS ANTICOAGULANT DISORDER: ICD-10-CM

## 2022-07-05 DIAGNOSIS — I26.99 IATROGENIC PULMONARY EMBOLISM AND INFARCTION, SUBSEQUENT ENCOUNTER: ICD-10-CM

## 2022-07-05 DIAGNOSIS — R06.02 SHORTNESS OF BREATH: ICD-10-CM

## 2022-07-05 DIAGNOSIS — R00.2 PALPITATION: Primary | ICD-10-CM

## 2022-07-05 DIAGNOSIS — T81.718D IATROGENIC PULMONARY EMBOLISM AND INFARCTION, SUBSEQUENT ENCOUNTER: ICD-10-CM

## 2022-07-05 DIAGNOSIS — R00.0 TACHYCARDIA: ICD-10-CM

## 2022-07-05 DIAGNOSIS — M25.662 DECREASED RANGE OF MOTION OF LEFT KNEE: ICD-10-CM

## 2022-07-05 DIAGNOSIS — R29.898 WEAKNESS OF LEFT LOWER EXTREMITY: ICD-10-CM

## 2022-07-05 PROCEDURE — 1159F PR MEDICATION LIST DOCUMENTED IN MEDICAL RECORD: ICD-10-PCS | Mod: CPTII,S$GLB,, | Performed by: INTERNAL MEDICINE

## 2022-07-05 PROCEDURE — 4010F PR ACE/ARB THEARPY RXD/TAKEN: ICD-10-PCS | Mod: CPTII,S$GLB,, | Performed by: INTERNAL MEDICINE

## 2022-07-05 PROCEDURE — 4010F ACE/ARB THERAPY RXD/TAKEN: CPT | Mod: CPTII,S$GLB,, | Performed by: INTERNAL MEDICINE

## 2022-07-05 PROCEDURE — 1159F MED LIST DOCD IN RCRD: CPT | Mod: CPTII,S$GLB,, | Performed by: INTERNAL MEDICINE

## 2022-07-05 PROCEDURE — 3074F SYST BP LT 130 MM HG: CPT | Mod: CPTII,S$GLB,, | Performed by: INTERNAL MEDICINE

## 2022-07-05 PROCEDURE — 3074F PR MOST RECENT SYSTOLIC BLOOD PRESSURE < 130 MM HG: ICD-10-PCS | Mod: CPTII,S$GLB,, | Performed by: INTERNAL MEDICINE

## 2022-07-05 PROCEDURE — 97110 THERAPEUTIC EXERCISES: CPT | Performed by: PHYSICAL THERAPIST

## 2022-07-05 PROCEDURE — 99999 PR PBB SHADOW E&M-EST. PATIENT-LVL V: ICD-10-PCS | Mod: PBBFAC,,, | Performed by: INTERNAL MEDICINE

## 2022-07-05 PROCEDURE — 99999 PR PBB SHADOW E&M-EST. PATIENT-LVL V: CPT | Mod: PBBFAC,,, | Performed by: INTERNAL MEDICINE

## 2022-07-05 PROCEDURE — 99204 OFFICE O/P NEW MOD 45 MIN: CPT | Mod: S$GLB,,, | Performed by: INTERNAL MEDICINE

## 2022-07-05 PROCEDURE — 3078F PR MOST RECENT DIASTOLIC BLOOD PRESSURE < 80 MM HG: ICD-10-PCS | Mod: CPTII,S$GLB,, | Performed by: INTERNAL MEDICINE

## 2022-07-05 PROCEDURE — 99204 PR OFFICE/OUTPT VISIT, NEW, LEVL IV, 45-59 MIN: ICD-10-PCS | Mod: S$GLB,,, | Performed by: INTERNAL MEDICINE

## 2022-07-05 PROCEDURE — 3008F BODY MASS INDEX DOCD: CPT | Mod: CPTII,S$GLB,, | Performed by: INTERNAL MEDICINE

## 2022-07-05 PROCEDURE — 3078F DIAST BP <80 MM HG: CPT | Mod: CPTII,S$GLB,, | Performed by: INTERNAL MEDICINE

## 2022-07-05 PROCEDURE — 3008F PR BODY MASS INDEX (BMI) DOCUMENTED: ICD-10-PCS | Mod: CPTII,S$GLB,, | Performed by: INTERNAL MEDICINE

## 2022-07-05 NOTE — PROGRESS NOTES
[facility]   Physical Therapy Treatment Note     Name: Mable Redding  Clinic Number: [id number]    Therapy Diagnosis:   Encounter Diagnoses   Name Primary?    Acute pain of left knee Yes    Decreased range of motion of left knee     Weakness of left lower extremity      Physician: Rudy Dempsey MD    Visit Date: 7/5/2022    Physician Orders: PT Eval and Treat   Medical Diagnosis from Referral: S83.242D (ICD-10-CM) - Acute medial meniscus tear of left knee, subsequent encounter  Evaluation Date: 5/30/2022  Authorization Period Expiration: 7/1/2022  Plan of Care Expiration: 7/30/2022  Visit # / Visits authorized: 10/20 (+eval)  FOTO: 1/3  PTA Visit #: 0/5     Time In: 3:35 pm   Time Out: 4:28 pm   Total Billable Time: 53 minutes    Precautions: Standard      Surgery: Left knee Arthroscopy on 5/27/2022. WBAT. ROM AT.     SUBJECTIVE     Pt reports: tired from cleaning up at her property in [place]. She feels her knee continues to get stronger and had no adverse reactions to previous session.     She was compliant with home exercise program.    Response to previous treatment: increased knee ROM.    Functional change: improved community ambulation.     Pain: 0/10  Location: left knee      OBJECTIVE     Objective Measures updated at progress report unless specified.   Posture: decreased knee extension in standing/walking  Palpation: no tenderness to palpation   Sensation: no deficits noted.      Range of Motion/Strength:    Knee Right Left Pain/Dysfunction with Movement   AROM         flexion 128 126     extension -4 0           L/E MMT Right Left Pain/Dysfunction with Movement   Hip Flexion 5/5 4+/5     Hip Extension 5/5 5/5     Hip Abduction 5/5 4+/5     Hip Adduction 5/5 5/5     Knee Flexion 5/5 4+/5     Knee Extension 5/5 4+/5     Ankle DF 5/5 5/5     Ankle PF 5/5 5/5     Ankle Inversion 5/5 5/5     Ankle Eversion 5/5 5/5        Flexibility: unremarkable      Gait  "Analysis: mild trendelenburg     Single Leg Stance: R 30 seconds, L 30 seconds        CMS Impairment/Limitation/Restriction for FOTO Knee Survey     Therapist reviewed FOTO scores for Mable Redding on 5/30/2022.   FOTO documents entered into [employer] - see Media section.     Limitation Score: 37%            TREATMENT       Mable received the treatments listed below:      Mable received therapeutic exercises to develop strength, endurance, ROM, flexibility, posture and core stabilization for 53 minutes including:    Assessment included (10 minutes)     Recumbent Bike 5 minutes   Lateral squat walk GTB 3 laps  Monster walks GTB 3 laps  Standing clamshells 30x GTB  Single leg leg press: 4 plates 20x, 5 plates 12x, 6 plates 8x, 7 plates 2x3  Standing TKE pink SC: 5" hold, 15x  Seated knee extension machine, 1 plate, eccentric 15x  Step Ups 6" box 3x8  Step downs 4" box 3x5  Box Squats 20" 2x10, Staggered 10#KB  Long Bridge 2x15   Standing gastroc stretch 30" x 3    Mable received the following manual therapy techniques: Joint mobilizations, Myofacial release and Soft tissue Mobilization were applied to the: left knee for 0 minutes, including:    ASTYM to left knee, anterior and posterior  PF mobilizations  Grade I/II flexion and extension mobilizations.     Mable participated in neuromuscular re-education activities to improve: Balance, Coordination, Kinesthetic, Sense, Proprioception and Posture for (0) minutes. The following activities were included:    SLS - star drill: 2 sets on ground, 1 set on Bosu 5 reps - 3 cone  SL RDLs to 12" box, 20x B 10#KB    Mable participated in dynamic functional therapeutic activities to improve functional performance for 0  minutes, including:      Patient Education and Home Exercises     Education provided:   - HEP provided  - Home modalities prn  - Education on return to work in 2 weeks      Written Home Exercises Provided: yes.  Exercises were reviewed and Mable was able " to demonstrate them prior to the end of the session.  Mable demonstrated good  understanding of the education provided.      See EMR under Patient Instructions for exercises provided on evaluation.    ASSESSMENT     Objective measures were taken today, and Mable has met majority of her goals. She still demonstrates slight strength deficits in hip and knee musculature. She was educated to continue strengthening at home to improve overall function. She tolerated today's session well without any complaints of pain or discomfort.     Mable Is progressing well towards her goals.   Pt prognosis is Excellent.     Pt will continue to benefit from skilled outpatient physical therapy to address the deficits listed in the problem list box on initial evaluation, provide pt/family education and to maximize pt's level of independence in the home and community environment.     Pt's spiritual, cultural and educational needs considered and pt agreeable to plan of care and goals.     Anticipated barriers to physical therapy: None    Goals:  Short Term Goals (4 Weeks):  1. Patient will be compliant with home exercise program to supplement therapy in restoring pain free function. MET (7/5/022)  2. Patient will improve impaired lower extremity manual muscle tests to >/= 4/5 to improve dynamic hip/knee support for functional tasks. MET (7/5/2022)   3. Patient will demonstrate Full symmetrical ROM compared to RLE to improve gait mechanics. MET (5/5/2022)      Long Term Goals (8 Weeks):  1. Patient will improve FOTO score to </= 25% limited to decrease perceived limitation with mobility.(NOt met)  2. Patient will improve impaired lower extremity manual muscle tests to >/= 4+/5 to improve dynamic hip/knee support for functional tasks. MET (7/5/2022)   3. Patient will demonstrate proper gait mechanics with full knee extension and no increase in pain. MET (7/5/2022)   4. Patient will demonstrate full squat to ground with 10# box lift with  no increase in knee pain to show functional improvement.  Met    PLAN     Continue with physical therapy as planned.     Plan of care Certification: 5/30/2022 to 7/30/2022.     Outpatient Physical Therapy 2 times weekly for 8 weeks to include the following interventions: Aquatic Therapy, Electrical Stimulation IFC, Russian, Gait Training, Manual Therapy, Moist Heat/ Ice, Neuromuscular Re-ed, Patient Education, Self Care, Therapeutic Activities, Therapeutic Exercise and Ultrasound, ASTYM, Kinesiotaping PRN, Functional Dry Needling       Nnamdi Boucher, PT, DPT

## 2022-07-05 NOTE — PROGRESS NOTES
Subjective:   Patient ID:   Mable Redding is a 51 y.o. female who presents for follow up of Palpitations        HPI   1/30/2019  A 49 yo female with lupus anticoagulant positive in the setting of birth control pills elading to recurrent pe was on coumadin for 13 years also ahs fh premature cad and has high lp(a) and hscrp she is on statins and asa ec 81 mg po daily./ is here to establish care. She has nocturnal chest pain midsternal that occurs in the middle of the nite wakes her  Up from sleep no sour taste no choking she feels her heart race at times.  No snoring no cessation of breathing. She has exertional shortness of breath that is chronic no regular exercise physically . Has no symptoms of shortness of breath with her home daily activities she  Noticed that when talking and walking. No leg swelling. has palpitation. Has no syncope near syncope . Has hysterectomy dec 2017.she has ct scan of chest showing high calcium score no Report available      11/3/2021     HERE FOR F/U HAD COVID VACCINE IN AUGUST HAD PALPITATION USED HER B BLOCKERS HELPED PALOPITATION. HAD COVID IN October FEVER HEADACHE SORETHROAT NAUSEA VOMITING PASSED OUT PLACED ON LOVENOX DUE TO HYPERCOAGULABLE STATE ER VISIT NO PNEUMONIA. NOW STILL SHORT OF BREATH HAS INTERMITTENT LEG SWELLING  USES HCTZ (LEFT GREATER THAN RIGHT). SHE IS TAKING TOPROL 12.5 MG PO DAILY WHICH IS HELPING HER PALPITATION SHORTNESS OF BREATH AND TIGHTNESS   SHE IS NOT DRINKING WATER SHE IS ON THE DEHYDRATED HER LEG SWELLING IS WORSE AT THE END OF THE DAY.       Past Medical History:   Diagnosis Date    Anxiety      Bursitis       Right hip    COVID-19 11/3/2021    Depression      Lupus anticoagulant disorder      Lupus anticoagulant disorder 1/30/2019    Thyroid disease            This patient complains of more frequent palpitations no chest discomfort.  No lightheadedness or dizziness.  Will go ahead with a another cardiac echo and Holter monitor.  Patient had  COVID again several months ago and for this reason perhaps has evidence cardiomyopathy.  Otherwise stable this time no chest discomfort noted.      Review of Systems   Constitutional: Negative for chills, diaphoresis, night sweats, weight gain and weight loss.   HENT: Negative for congestion, hoarse voice, sore throat and stridor.    Eyes: Negative for double vision and pain.   Cardiovascular: Negative for chest pain, claudication, cyanosis, dyspnea on exertion, irregular heartbeat, leg swelling, near-syncope, orthopnea, palpitations, paroxysmal nocturnal dyspnea and syncope.   Respiratory: Negative for cough, hemoptysis, shortness of breath, sleep disturbances due to breathing, snoring, sputum production and wheezing.    Endocrine: Negative for cold intolerance, heat intolerance and polydipsia.   Hematologic/Lymphatic: Negative for bleeding problem. Does not bruise/bleed easily.   Skin: Negative for color change, dry skin and rash.   Musculoskeletal: Negative for joint swelling and muscle cramps.   Gastrointestinal: Negative for bloating, abdominal pain, constipation, diarrhea, dysphagia, melena, nausea and vomiting.   Genitourinary: Negative for flank pain and urgency.   Neurological: Negative for dizziness, focal weakness, headaches, light-headedness, loss of balance, seizures and weakness.   Psychiatric/Behavioral: Negative for altered mental status and memory loss. The patient is not nervous/anxious.      Family History   Problem Relation Age of Onset    Hyperlipidemia Mother     Coronary artery disease Father     Hyperlipidemia Father     Hypertension Father     No Known Problems Sister     No Known Problems Maternal Aunt     No Known Problems Maternal Uncle     No Known Problems Paternal Aunt     No Known Problems Paternal Uncle     No Known Problems Maternal Grandmother     Diabetes type II Maternal Grandfather     Hypertension Paternal Grandmother     Hyperlipidemia Paternal Grandmother      Heart disease Paternal Grandmother     Lung cancer Paternal Grandmother     Lung cancer Paternal Grandfather     Amblyopia Neg Hx     Blindness Neg Hx     Cancer Neg Hx     Cataracts Neg Hx     Diabetes Neg Hx     Glaucoma Neg Hx     Macular degeneration Neg Hx     Retinal detachment Neg Hx     Strabismus Neg Hx     Stroke Neg Hx     Thyroid disease Neg Hx      Past Medical History:   Diagnosis Date    Anxiety     Bursitis     Right hip    COVID-19 11/3/2021    Depression     Digestive disorder     Herpes genitalia     History of blood clots     HPV (human papilloma virus) infection     HTN (hypertension) 2022    Lupus anticoagulant disorder     Lupus anticoagulant disorder 2019    Pulmonary embolus     Thyroid disease      Social History     Socioeconomic History    Marital status:    Tobacco Use    Smoking status: Former Smoker     Packs/day: 0.25     Years: 10.00     Pack years: 2.50     Quit date:      Years since quittin.5    Smokeless tobacco: Never Used   Substance and Sexual Activity    Alcohol use: Yes     Comment: Socially    Drug use: No    Sexual activity: Yes     Partners: Male     Birth control/protection: See Surgical Hx     Current Outpatient Medications on File Prior to Visit   Medication Sig Dispense Refill    clotrimazole-betamethasone 1-0.05% (LOTRISONE) cream APPLY CREAM TO AFFECTED AREA TWICE DAILY  1    docusate sodium (COLACE) 100 MG capsule 300 mg.       ezetimibe (ZETIA) 10 mg tablet Take 10 mg by mouth every evening.      finasteride (PROSCAR) 5 mg tablet Take 5 mg by mouth every evening.      hydroCHLOROthiazide (HYDRODIURIL) 25 MG tablet TAKE 1 TABLET(25 MG) BY MOUTH EVERY DAY (Patient taking differently: daily as needed.) 90 tablet 3    hydrocortisone (WESTCORT) 0.2 % cream PERCY EXT AA BID      loratadine (CLARITIN) 10 mg tablet Take 10 mg by mouth daily as needed.      losartan (COZAAR) 100 MG tablet Take 100 mg by mouth  nightly.      metoprolol succinate (TOPROL-XL) 25 MG 24 hr tablet Take 1 tablet (25 mg total) by mouth once daily. (Patient taking differently: Take 50 mg by mouth every evening.) 30 tablet 11    mupirocin (BACTROBAN) 2 % ointment Apply to nostrils 2 (two) times daily. 22 g 2    NP THYROID 90 mg Tab 150 mg before breakfast.      pantoprazole (PROTONIX) 40 MG tablet Take 40 mg by mouth every evening.      rosuvastatin (CRESTOR) 40 MG Tab 20 mg every evening.      venlafaxine (EFFEXOR XR) 150 MG Cp24 Take 1 capsule (150 mg total) by mouth once daily. (Patient taking differently: Take 150 mg by mouth every evening.) 90 capsule 3    vitamin D 1000 units Tab Take 5,000 Units by mouth once daily.       alprazolam (XANAX) 0.25 MG tablet Take 0.25 mg by mouth nightly as needed.      docusate sodium (COLACE) 100 MG capsule Take 1 capsule (100 mg total) by mouth 2 (two) times daily. (Patient not taking: Reported on 2022) 30 capsule 0    HYDROcodone-acetaminophen (NORCO) 5-325 mg per tablet Take 1 tablet by mouth every 4 to 6 hours as needed for Pain. (Patient not taking: Reported on 2022) 30 tablet 0    hydrOXYchloroQUINE (PLAQUENIL) 200 mg tablet Take by mouth.      ivermectin (STROMECTOL) 3 mg Tab SMARTSI Tablet(s) By Mouth Once      ondansetron (ZOFRAN) 4 MG tablet Take 1 tablet (4 mg total) by mouth every 8 (eight) hours as needed for Nausea. (Patient not taking: No sig reported) 30 tablet 0    [DISCONTINUED] clobetasoL (CLOBEX) 0.05 % shampoo APPLY TOPICALLY EVERY DAY (Patient not taking: No sig reported) 118 mL 2     Current Facility-Administered Medications on File Prior to Visit   Medication Dose Route Frequency Provider Last Rate Last Admin    acetaminophen tablet 650 mg  650 mg Oral Once PRN Boone Saha MD        albuterol inhaler 2 puff  2 puff Inhalation Q20 Min PRN Boone Saha MD        EPINEPHrine (EPIPEN) 0.3 mg/0.3 mL pen injection 0.3 mg  0.3 mg Intramuscular KARTIK Shook  ANGUS Saha MD        methylPREDNISolone sodium succinate injection 40 mg  40 mg Intravenous Once PRN Boone Saha MD        sodium chloride 0.9% flush 10 mL  10 mL Intravenous PRN Boone Saha MD         Review of patient's allergies indicates:   Allergen Reactions    Aldactone [spironolactone] Swelling    Remdesivir Hives    Sulfa (sulfonamide antibiotics) Edema       Objective:     Physical Exam  Eyes:      Pupils: Pupils are equal, round, and reactive to light.   Neck:      Trachea: No tracheal deviation.   Cardiovascular:      Rate and Rhythm: Normal rate and regular rhythm.      Pulses: Intact distal pulses.           Carotid pulses are 2+ on the right side and 2+ on the left side.       Radial pulses are 2+ on the right side and 2+ on the left side.        Femoral pulses are 2+ on the right side and 2+ on the left side.       Popliteal pulses are 2+ on the right side and 2+ on the left side.        Dorsalis pedis pulses are 2+ on the right side and 2+ on the left side.        Posterior tibial pulses are 2+ on the right side and 2+ on the left side.      Heart sounds: Normal heart sounds. No murmur heard.    No friction rub. No gallop.   Pulmonary:      Effort: Pulmonary effort is normal. No respiratory distress.      Breath sounds: Normal breath sounds. No stridor. No wheezing or rales.   Chest:      Chest wall: No tenderness.   Abdominal:      General: There is no distension.      Tenderness: There is no abdominal tenderness. There is no rebound.   Musculoskeletal:         General: No tenderness.      Cervical back: Normal range of motion.   Skin:     General: Skin is warm and dry.   Neurological:      Mental Status: She is alert and oriented to person, place, and time.     holter 11/03/2021:  · Sinus rhythm with heart rates varying between 51 and 124 BPM with an average of 75 BPM.  · There were very rare PVCs totalling 64 and averaging 1.65 per hour.  · There were rare PACs totalling 287 and  averaging 7.38 per hour.         Assessment:     1. SOB (shortness of breath)    2. Long term (current) use of anticoagulants    3. Family history of premature CAD    4. COVID-19    5. Shortness of breath    6. Lupus anticoagulant disorder    7. Tachycardia    8. Palpitations    9. Palpitation    10. High coronary artery calcium score    11. Iatrogenic pulmonary embolism and infarction, subsequent encounter        Plan:     SOB (shortness of breath)    Long term (current) use of anticoagulants    Family history of premature CAD    COVID-19    Shortness of breath    Lupus anticoagulant disorder    Tachycardia    Palpitations    Palpitation    High coronary artery calcium score    Iatrogenic pulmonary embolism and infarction, subsequent encounter    Impression 1 shortness of breath.  The patient has tachycardia cardiac echo and 14 day monitor will be done   2. Hypertension stable on metoprolol and losartan.

## 2022-07-06 NOTE — PLAN OF CARE
[facility]  Physical Therapy Discharge Note    Name: Mable Redding  Clinic Number: [id number]    Therapy Diagnosis:   Encounter Diagnoses   Name Primary?    Acute pain of left knee Yes    Decreased range of motion of left knee     Weakness of left lower extremity      Physician: Rudy Dempsey MD    Physician Orders: PT Eval and Treat   Medical Diagnosis from Referral: S83.242D (ICD-10-CM) - Acute medial meniscus tear of left knee, subsequent encounter  Evaluation Date: 5/30/2022      Date of Last visit: 7/5/2022  Total Visits Received: 11    ASSESSMENT      Objective Measures updated at progress report unless specified.   Posture: decreased knee extension in standing/walking  Palpation: no tenderness to palpation   Sensation: no deficits noted.      Range of Motion/Strength:    Knee Right Left Pain/Dysfunction with Movement   AROM         flexion 128 126     extension -4 0           L/E MMT Right Left Pain/Dysfunction with Movement   Hip Flexion 5/5 4+/5     Hip Extension 5/5 5/5     Hip Abduction 5/5 4+/5     Hip Adduction 5/5 5/5     Knee Flexion 5/5 4+/5     Knee Extension 5/5 4+/5     Ankle DF 5/5 5/5     Ankle PF 5/5 5/5     Ankle Inversion 5/5 5/5     Ankle Eversion 5/5 5/5        Flexibility: unremarkable      Gait Analysis: mild trendelenburg     Single Leg Stance: R 30 seconds, L 30 seconds        CMS Impairment/Limitation/Restriction for FOTO Knee Survey     Therapist reviewed FOTO scores for Mable Redding on 5/30/2022.   FOTO documents entered into [employer] - see Media section.     Limitation Score: 37%            Discharge reason: Patient has met all of his/her goals and Patient has reached the maximum rehab potential for the present time    Discharge FOTO Score: 37% limited    Goals:  Short Term Goals (4 Weeks):  1. Patient will be compliant with home exercise program to supplement therapy in restoring pain free function. MET (7/5/022)  2. Patient will  improve impaired lower extremity manual muscle tests to >/= 4/5 to improve dynamic hip/knee support for functional tasks. MET (7/5/2022)   3. Patient will demonstrate Full symmetrical ROM compared to RLE to improve gait mechanics. MET (5/5/2022)      Long Term Goals (8 Weeks):  1. Patient will improve FOTO score to </= 25% limited to decrease perceived limitation with mobility.(NOt met)  2. Patient will improve impaired lower extremity manual muscle tests to >/= 4+/5 to improve dynamic hip/knee support for functional tasks. MET (7/5/2022)   3. Patient will demonstrate proper gait mechanics with full knee extension and no increase in pain. MET (7/5/2022)   4. Patient will demonstrate full squat to ground with 10# box lift with no increase in knee pain to show functional improvement.  Met    PLAN   This patient is discharged from Physical Therapy      Nnamdi Boucher PT

## 2022-07-22 ENCOUNTER — HOSPITAL ENCOUNTER (OUTPATIENT)
Dept: CARDIOLOGY | Facility: HOSPITAL | Age: 52
Discharge: HOME | End: 2022-07-22
Attending: INTERNAL MEDICINE
Payer: COMMERCIAL

## 2022-07-22 VITALS
DIASTOLIC BLOOD PRESSURE: 64 MMHG | HEIGHT: 66 IN | WEIGHT: 209 LBS | BODY MASS INDEX: 33.59 KG/M2 | SYSTOLIC BLOOD PRESSURE: 110 MMHG

## 2022-07-22 DIAGNOSIS — I26.99 IATROGENIC PULMONARY EMBOLISM AND INFARCTION, SUBSEQUENT ENCOUNTER: ICD-10-CM

## 2022-07-22 DIAGNOSIS — R00.2 PALPITATION: ICD-10-CM

## 2022-07-22 DIAGNOSIS — R06.02 SHORTNESS OF BREATH: ICD-10-CM

## 2022-07-22 DIAGNOSIS — R93.1 HIGH CORONARY ARTERY CALCIUM SCORE: ICD-10-CM

## 2022-07-22 DIAGNOSIS — R06.02 SOB (SHORTNESS OF BREATH): ICD-10-CM

## 2022-07-22 DIAGNOSIS — D68.62 LUPUS ANTICOAGULANT DISORDER: ICD-10-CM

## 2022-07-22 DIAGNOSIS — Z79.01 LONG TERM (CURRENT) USE OF ANTICOAGULANTS: ICD-10-CM

## 2022-07-22 DIAGNOSIS — R00.0 TACHYCARDIA: ICD-10-CM

## 2022-07-22 DIAGNOSIS — R00.2 PALPITATIONS: ICD-10-CM

## 2022-07-22 DIAGNOSIS — T81.718D IATROGENIC PULMONARY EMBOLISM AND INFARCTION, SUBSEQUENT ENCOUNTER: ICD-10-CM

## 2022-07-22 DIAGNOSIS — U07.1 COVID-19: ICD-10-CM

## 2022-07-22 DIAGNOSIS — Z82.49 FAMILY HISTORY OF PREMATURE CAD: ICD-10-CM

## 2022-07-22 LAB
AORTIC ROOT ANNULUS: 2.71 CM
AV INDEX (PROSTH): 0.71
AV MEAN GRADIENT: 7 MMHG
AV PEAK GRADIENT: 13 MMHG
AV VALVE AREA: 2.33 CM2
AV VELOCITY RATIO: 0.67
BSA FOR ECHO PROCEDURE: 2.1 M2
CV ECHO LV RWT: 0.42 CM
DOP CALC AO PEAK VEL: 1.81 M/S
DOP CALC AO VTI: 42 CM
DOP CALC LVOT AREA: 3.3 CM2
DOP CALC LVOT DIAMETER: 2.04 CM
DOP CALC LVOT PEAK VEL: 1.21 M/S
DOP CALC LVOT STROKE VOLUME: 97.68 CM3
DOP CALC RVOT PEAK VEL: 0.81 M/S
DOP CALC RVOT VTI: 20.3 CM
DOP CALCLVOT PEAK VEL VTI: 29.9 CM
E WAVE DECELERATION TIME: 191.68 MSEC
E/A RATIO: 1.24
E/E' RATIO: 10.48 M/S
ECHO LV POSTERIOR WALL: 0.89 CM (ref 0.6–1.1)
EJECTION FRACTION: 60 %
FRACTIONAL SHORTENING: 34 % (ref 28–44)
INTERVENTRICULAR SEPTUM: 0.98 CM (ref 0.6–1.1)
IVRT: 102.76 MSEC
LA MAJOR: 5.32 CM
LA MINOR: 4.81 CM
LA WIDTH: 3.2 CM
LEFT ATRIUM SIZE: 3.63 CM
LEFT ATRIUM VOLUME INDEX MOD: 21.7 ML/M2
LEFT ATRIUM VOLUME INDEX: 24.5 ML/M2
LEFT ATRIUM VOLUME MOD: 44.28 CM3
LEFT ATRIUM VOLUME: 49.88 CM3
LEFT INTERNAL DIMENSION IN SYSTOLE: 2.81 CM (ref 2.1–4)
LEFT VENTRICLE DIASTOLIC VOLUME INDEX: 39.66 ML/M2
LEFT VENTRICLE DIASTOLIC VOLUME: 80.9 ML
LEFT VENTRICLE MASS INDEX: 62 G/M2
LEFT VENTRICLE SYSTOLIC VOLUME INDEX: 14.6 ML/M2
LEFT VENTRICLE SYSTOLIC VOLUME: 29.86 ML
LEFT VENTRICULAR INTERNAL DIMENSION IN DIASTOLE: 4.25 CM (ref 3.5–6)
LEFT VENTRICULAR MASS: 127.44 G
LV LATERAL E/E' RATIO: 10 M/S
LV SEPTAL E/E' RATIO: 11 M/S
LVOT MG: 2.99 MMHG
LVOT MV: 0.81 CM/S
MV PEAK A VEL: 0.89 M/S
MV PEAK E VEL: 1.1 M/S
PISA TR MAX VEL: 2.91 M/S
PULM VEIN S/D RATIO: 1.05
PV MEAN GRADIENT: 1.38 MMHG
PV MV: 0.8 M/S
PV PEAK D VEL: 0.75 M/S
PV PEAK S VEL: 0.79 M/S
PV PEAK VELOCITY: 1.12 CM/S
RA MAJOR: 4.56 CM
RA WIDTH: 3.16 CM
RIGHT VENTRICULAR END-DIASTOLIC DIMENSION: 2.64 CM
SINUS: 2.04 CM
STJ: 2.08 CM
TDI LATERAL: 0.11 M/S
TDI SEPTAL: 0.1 M/S
TDI: 0.11 M/S
TR MAX PG: 34 MMHG
TRICUSPID ANNULAR PLANE SYSTOLIC EXCURSION: 2.33 CM

## 2022-07-22 PROCEDURE — 93306 TTE W/DOPPLER COMPLETE: CPT

## 2022-07-22 PROCEDURE — 93306 ECHO (CUPID ONLY): ICD-10-PCS | Mod: 26,,, | Performed by: INTERNAL MEDICINE

## 2022-07-22 PROCEDURE — 93306 TTE W/DOPPLER COMPLETE: CPT | Mod: 26,,, | Performed by: INTERNAL MEDICINE

## 2022-08-05 ENCOUNTER — LAB VISIT (OUTPATIENT)
Dept: LAB | Facility: HOSPITAL | Age: 52
End: 2022-08-05
Payer: COMMERCIAL

## 2022-08-05 DIAGNOSIS — R53.83 FATIGUE, UNSPECIFIED TYPE: Primary | ICD-10-CM

## 2022-08-05 DIAGNOSIS — D68.61 ANTI-PHOSPHOLIPID SYNDROME: ICD-10-CM

## 2022-08-05 DIAGNOSIS — R53.83 FATIGUE, UNSPECIFIED TYPE: ICD-10-CM

## 2022-08-05 LAB
ALBUMIN SERPL BCP-MCNC: 3.8 G/DL (ref 3.5–5.2)
ALP SERPL-CCNC: 78 U/L (ref 55–135)
ALT SERPL W/O P-5'-P-CCNC: 35 U/L (ref 10–44)
ANION GAP SERPL CALC-SCNC: 11 MMOL/L (ref 8–16)
AST SERPL-CCNC: 25 U/L (ref 10–40)
BASOPHILS # BLD AUTO: 0.03 K/UL (ref 0–0.2)
BASOPHILS NFR BLD: 0.5 % (ref 0–1.9)
BILIRUB SERPL-MCNC: 0.3 MG/DL (ref 0.1–1)
BUN SERPL-MCNC: 17 MG/DL (ref 6–20)
CALCIUM SERPL-MCNC: 9.8 MG/DL (ref 8.7–10.5)
CHLORIDE SERPL-SCNC: 103 MMOL/L (ref 95–110)
CO2 SERPL-SCNC: 27 MMOL/L (ref 23–29)
CREAT SERPL-MCNC: 0.7 MG/DL (ref 0.5–1.4)
DIFFERENTIAL METHOD: NORMAL
EOSINOPHIL # BLD AUTO: 0.3 K/UL (ref 0–0.5)
EOSINOPHIL NFR BLD: 4 % (ref 0–8)
ERYTHROCYTE [DISTWIDTH] IN BLOOD BY AUTOMATED COUNT: 13.2 % (ref 11.5–14.5)
EST. GFR  (NO RACE VARIABLE): >60 ML/MIN/1.73 M^2
FERRITIN SERPL-MCNC: 60 NG/ML (ref 20–300)
GLUCOSE SERPL-MCNC: 87 MG/DL (ref 70–110)
HCT VFR BLD AUTO: 39.9 % (ref 37–48.5)
HGB BLD-MCNC: 13.2 G/DL (ref 12–16)
IMM GRANULOCYTES # BLD AUTO: 0.02 K/UL (ref 0–0.04)
IMM GRANULOCYTES NFR BLD AUTO: 0.3 % (ref 0–0.5)
IRON SERPL-MCNC: 48 UG/DL (ref 30–160)
LYMPHOCYTES # BLD AUTO: 2.2 K/UL (ref 1–4.8)
LYMPHOCYTES NFR BLD: 34.3 % (ref 18–48)
MCH RBC QN AUTO: 27.3 PG (ref 27–31)
MCHC RBC AUTO-ENTMCNC: 33.1 G/DL (ref 32–36)
MCV RBC AUTO: 82 FL (ref 82–98)
MONOCYTES # BLD AUTO: 0.5 K/UL (ref 0.3–1)
MONOCYTES NFR BLD: 7.4 % (ref 4–15)
NEUTROPHILS # BLD AUTO: 3.5 K/UL (ref 1.8–7.7)
NEUTROPHILS NFR BLD: 53.5 % (ref 38–73)
NRBC BLD-RTO: 0 /100 WBC
PLATELET # BLD AUTO: 248 K/UL (ref 150–450)
PMV BLD AUTO: 9.5 FL (ref 9.2–12.9)
POTASSIUM SERPL-SCNC: 3.9 MMOL/L (ref 3.5–5.1)
PROT SERPL-MCNC: 6.8 G/DL (ref 6–8.4)
RBC # BLD AUTO: 4.84 M/UL (ref 4–5.4)
SATURATED IRON: 11 % (ref 20–50)
SODIUM SERPL-SCNC: 141 MMOL/L (ref 136–145)
T4 FREE SERPL-MCNC: 0.81 NG/DL (ref 0.71–1.51)
TOTAL IRON BINDING CAPACITY: 420 UG/DL (ref 250–450)
TRANSFERRIN SERPL-MCNC: 284 MG/DL (ref 200–375)
TSH SERPL DL<=0.005 MIU/L-ACNC: <0.01 UIU/ML (ref 0.4–4)
VIT B12 SERPL-MCNC: 670 PG/ML (ref 210–950)
WBC # BLD AUTO: 6.48 K/UL (ref 3.9–12.7)

## 2022-08-05 PROCEDURE — 82728 ASSAY OF FERRITIN: CPT

## 2022-08-05 PROCEDURE — 82607 VITAMIN B-12: CPT

## 2022-08-05 PROCEDURE — 84443 ASSAY THYROID STIM HORMONE: CPT

## 2022-08-05 PROCEDURE — 84439 ASSAY OF FREE THYROXINE: CPT

## 2022-08-05 PROCEDURE — 84466 ASSAY OF TRANSFERRIN: CPT

## 2022-08-05 PROCEDURE — 80053 COMPREHEN METABOLIC PANEL: CPT | Performed by: INTERNAL MEDICINE

## 2022-08-05 PROCEDURE — 85025 COMPLETE CBC W/AUTO DIFF WBC: CPT

## 2022-08-05 PROCEDURE — 36415 COLL VENOUS BLD VENIPUNCTURE: CPT

## 2022-08-11 DIAGNOSIS — E61.1 IRON DEFICIENCY: ICD-10-CM

## 2022-08-11 RX ORDER — SODIUM CHLORIDE 0.9 % (FLUSH) 0.9 %
10 SYRINGE (ML) INJECTION
Status: CANCELLED | OUTPATIENT
Start: 2022-08-18

## 2022-08-11 RX ORDER — HEPARIN 100 UNIT/ML
500 SYRINGE INTRAVENOUS
Status: CANCELLED | OUTPATIENT
Start: 2022-08-11

## 2022-08-11 RX ORDER — HEPARIN 100 UNIT/ML
500 SYRINGE INTRAVENOUS
Status: CANCELLED | OUTPATIENT
Start: 2022-08-18

## 2022-08-11 RX ORDER — SODIUM CHLORIDE 0.9 % (FLUSH) 0.9 %
10 SYRINGE (ML) INJECTION
Status: CANCELLED | OUTPATIENT
Start: 2022-08-11

## 2022-08-16 ENCOUNTER — INFUSION (OUTPATIENT)
Dept: INFUSION THERAPY | Facility: HOSPITAL | Age: 52
End: 2022-08-16
Payer: COMMERCIAL

## 2022-08-16 VITALS
TEMPERATURE: 98 F | RESPIRATION RATE: 18 BRPM | DIASTOLIC BLOOD PRESSURE: 78 MMHG | HEART RATE: 57 BPM | OXYGEN SATURATION: 97 % | SYSTOLIC BLOOD PRESSURE: 132 MMHG

## 2022-08-16 DIAGNOSIS — E61.1 IRON DEFICIENCY: Primary | ICD-10-CM

## 2022-08-16 PROCEDURE — 25000003 PHARM REV CODE 250

## 2022-08-16 PROCEDURE — 63600175 PHARM REV CODE 636 W HCPCS: Mod: JG

## 2022-08-16 PROCEDURE — 96375 TX/PRO/DX INJ NEW DRUG ADDON: CPT

## 2022-08-16 PROCEDURE — 96365 THER/PROPH/DIAG IV INF INIT: CPT

## 2022-08-16 RX ORDER — METHYLPREDNISOLONE SOD SUCC 125 MG
40 VIAL (EA) INJECTION
Status: DISCONTINUED | OUTPATIENT
Start: 2022-08-16 | End: 2022-08-16 | Stop reason: HOSPADM

## 2022-08-16 RX ORDER — METHYLPREDNISOLONE SOD SUCC 125 MG
40 VIAL (EA) INJECTION
Status: COMPLETED | OUTPATIENT
Start: 2022-08-16 | End: 2022-08-16

## 2022-08-16 RX ORDER — METHYLPREDNISOLONE SOD SUCC 125 MG
40 VIAL (EA) INJECTION
Status: CANCELLED
Start: 2022-08-16

## 2022-08-16 RX ADMIN — FERUMOXYTOL 510 MG: 510 INJECTION INTRAVENOUS at 02:08

## 2022-08-16 RX ADMIN — METHYLPREDNISOLONE SODIUM SUCCINATE 40 MG: 125 INJECTION, POWDER, FOR SOLUTION INTRAMUSCULAR; INTRAVENOUS at 02:08

## 2022-08-16 NOTE — NURSING
Pt began sneezing and flushing on face. Infusion stopped. Flush bag initiated. NP notified. 40mg IVP steroid given by SHERRI Gurrola. Pt monitored for relief of nasal conjestion. Feraheme resumed at 250ml/hr. Pt tolerated infusion. NAD upon discharge.

## 2022-08-16 NOTE — PLAN OF CARE
Discussed plan of care with pt. Addressed any and ongoing concerns. Pt denies    Problem: Adult Inpatient Plan of Care  Goal: Plan of Care Review  Outcome: Ongoing, Progressing  Flowsheets (Taken 8/16/2022 1413)  Plan of Care Reviewed With: patient  Goal: Patient-Specific Goal (Individualized)  Outcome: Ongoing, Progressing  Goal: Absence of Hospital-Acquired Illness or Injury  Outcome: Ongoing, Progressing  Intervention: Identify and Manage Fall Risk  Flowsheets (Taken 8/16/2022 1413)  Safety Promotion/Fall Prevention:   room near unit station   in recliner, wheels locked  Intervention: Prevent Infection  Flowsheets (Taken 8/16/2022 1413)  Infection Prevention:   hand hygiene promoted   equipment surfaces disinfected  Goal: Optimal Comfort and Wellbeing  Outcome: Ongoing, Progressing  Intervention: Monitor Pain and Promote Comfort  Flowsheets (Taken 8/16/2022 1413)  Pain Management Interventions:   warm blanket provided   quiet environment facilitated  Intervention: Provide Person-Centered Care  Flowsheets (Taken 8/16/2022 1413)  Trust Relationship/Rapport:   care explained   thoughts/feelings acknowledged   reassurance provided   choices provided   emotional support provided   empathic listening provided   questions answered   questions encouraged

## 2022-09-01 DIAGNOSIS — R53.83 FATIGUE, UNSPECIFIED TYPE: Primary | ICD-10-CM

## 2022-09-13 DIAGNOSIS — G47.9 DISTURBANCE OF SLEEP: ICD-10-CM

## 2022-09-13 DIAGNOSIS — N95.1 HOT FLASHES DUE TO MENOPAUSE: Primary | ICD-10-CM

## 2022-09-13 RX ORDER — VENLAFAXINE HYDROCHLORIDE 150 MG/1
150 CAPSULE, EXTENDED RELEASE ORAL DAILY
Qty: 90 CAPSULE | Refills: 3 | Status: SHIPPED | OUTPATIENT
Start: 2022-09-13 | End: 2023-08-26

## 2022-09-29 ENCOUNTER — LAB VISIT (OUTPATIENT)
Dept: LAB | Facility: HOSPITAL | Age: 52
End: 2022-09-29
Attending: INTERNAL MEDICINE
Payer: COMMERCIAL

## 2022-09-29 DIAGNOSIS — E61.1 IRON DEFICIENCY: ICD-10-CM

## 2022-09-29 DIAGNOSIS — R53.83 FATIGUE, UNSPECIFIED TYPE: Primary | ICD-10-CM

## 2022-09-29 DIAGNOSIS — R53.83 FATIGUE, UNSPECIFIED TYPE: ICD-10-CM

## 2022-09-29 DIAGNOSIS — D68.61 ANTI-PHOSPHOLIPID SYNDROME: ICD-10-CM

## 2022-09-29 LAB
BASOPHILS # BLD AUTO: 0.04 K/UL (ref 0–0.2)
BASOPHILS NFR BLD: 0.5 % (ref 0–1.9)
DIFFERENTIAL METHOD: ABNORMAL
EOSINOPHIL # BLD AUTO: 0.4 K/UL (ref 0–0.5)
EOSINOPHIL NFR BLD: 5.1 % (ref 0–8)
ERYTHROCYTE [DISTWIDTH] IN BLOOD BY AUTOMATED COUNT: 14.6 % (ref 11.5–14.5)
FERRITIN SERPL-MCNC: 206 NG/ML (ref 20–300)
HCT VFR BLD AUTO: 43.6 % (ref 37–48.5)
HGB BLD-MCNC: 14 G/DL (ref 12–16)
IMM GRANULOCYTES # BLD AUTO: 0.02 K/UL (ref 0–0.04)
IMM GRANULOCYTES NFR BLD AUTO: 0.3 % (ref 0–0.5)
IRON SERPL-MCNC: 68 UG/DL (ref 30–160)
LYMPHOCYTES # BLD AUTO: 2.2 K/UL (ref 1–4.8)
LYMPHOCYTES NFR BLD: 30.1 % (ref 18–48)
MCH RBC QN AUTO: 27.9 PG (ref 27–31)
MCHC RBC AUTO-ENTMCNC: 32.1 G/DL (ref 32–36)
MCV RBC AUTO: 87 FL (ref 82–98)
MONOCYTES # BLD AUTO: 0.6 K/UL (ref 0.3–1)
MONOCYTES NFR BLD: 7.8 % (ref 4–15)
NEUTROPHILS # BLD AUTO: 4.2 K/UL (ref 1.8–7.7)
NEUTROPHILS NFR BLD: 56.2 % (ref 38–73)
NRBC BLD-RTO: 0 /100 WBC
PLATELET # BLD AUTO: 259 K/UL (ref 150–450)
PMV BLD AUTO: 9.8 FL (ref 9.2–12.9)
RBC # BLD AUTO: 5.01 M/UL (ref 4–5.4)
SATURATED IRON: 18 % (ref 20–50)
TOTAL IRON BINDING CAPACITY: 386 UG/DL (ref 250–450)
TRANSFERRIN SERPL-MCNC: 261 MG/DL (ref 200–375)
WBC # BLD AUTO: 7.4 K/UL (ref 3.9–12.7)

## 2022-09-29 PROCEDURE — 36415 COLL VENOUS BLD VENIPUNCTURE: CPT | Mod: PO

## 2022-09-29 PROCEDURE — 84466 ASSAY OF TRANSFERRIN: CPT

## 2022-09-29 PROCEDURE — 85025 COMPLETE CBC W/AUTO DIFF WBC: CPT | Performed by: INTERNAL MEDICINE

## 2022-09-29 PROCEDURE — 82728 ASSAY OF FERRITIN: CPT

## 2022-09-29 PROCEDURE — 86038 ANTINUCLEAR ANTIBODIES: CPT

## 2022-09-30 LAB — ANA SER QL IF: NORMAL

## 2022-10-21 RX ORDER — METHYLPREDNISOLONE SOD SUCC 125 MG
40 VIAL (EA) INJECTION
Status: CANCELLED
Start: 2022-10-21

## 2022-10-21 RX ORDER — SODIUM CHLORIDE 0.9 % (FLUSH) 0.9 %
10 SYRINGE (ML) INJECTION
Status: CANCELLED | OUTPATIENT
Start: 2022-10-21

## 2022-10-21 RX ORDER — HEPARIN 100 UNIT/ML
500 SYRINGE INTRAVENOUS
Status: CANCELLED | OUTPATIENT
Start: 2022-10-21

## 2022-10-28 ENCOUNTER — INFUSION (OUTPATIENT)
Dept: INFUSION THERAPY | Facility: HOSPITAL | Age: 52
End: 2022-10-28
Payer: COMMERCIAL

## 2022-10-28 VITALS
SYSTOLIC BLOOD PRESSURE: 127 MMHG | RESPIRATION RATE: 18 BRPM | TEMPERATURE: 98 F | HEART RATE: 75 BPM | DIASTOLIC BLOOD PRESSURE: 70 MMHG | OXYGEN SATURATION: 97 %

## 2022-10-28 DIAGNOSIS — E61.1 IRON DEFICIENCY: Primary | ICD-10-CM

## 2022-10-28 PROCEDURE — 63600175 PHARM REV CODE 636 W HCPCS: Mod: JG

## 2022-10-28 PROCEDURE — 96375 TX/PRO/DX INJ NEW DRUG ADDON: CPT

## 2022-10-28 PROCEDURE — 25000003 PHARM REV CODE 250

## 2022-10-28 PROCEDURE — 96365 THER/PROPH/DIAG IV INF INIT: CPT

## 2022-10-28 RX ORDER — METHYLPREDNISOLONE SOD SUCC 125 MG
40 VIAL (EA) INJECTION
Status: COMPLETED | OUTPATIENT
Start: 2022-10-28 | End: 2022-10-28

## 2022-10-28 RX ADMIN — FERUMOXYTOL 510 MG: 510 INJECTION INTRAVENOUS at 04:10

## 2022-10-28 RX ADMIN — METHYLPREDNISOLONE SODIUM SUCCINATE 40 MG: 125 INJECTION, POWDER, FOR SOLUTION INTRAMUSCULAR; INTRAVENOUS at 04:10

## 2022-10-28 NOTE — PLAN OF CARE
Discussed plan of care with pt. Addressed any and ongoing concerns. Pt denies    Problem: Adult Inpatient Plan of Care  Goal: Plan of Care Review  Outcome: Ongoing, Progressing  Flowsheets (Taken 10/28/2022 1513)  Plan of Care Reviewed With: patient  Goal: Patient-Specific Goal (Individualized)  Outcome: Ongoing, Progressing  Goal: Absence of Hospital-Acquired Illness or Injury  Outcome: Ongoing, Progressing  Intervention: Identify and Manage Fall Risk  Flowsheets (Taken 10/28/2022 1513)  Safety Promotion/Fall Prevention:   in recliner, wheels locked   room near unit station  Intervention: Prevent Infection  Flowsheets (Taken 10/28/2022 1513)  Infection Prevention:   hand hygiene promoted   equipment surfaces disinfected  Goal: Optimal Comfort and Wellbeing  Outcome: Ongoing, Progressing  Intervention: Monitor Pain and Promote Comfort  Flowsheets (Taken 10/28/2022 1513)  Pain Management Interventions: quiet environment facilitated  Intervention: Provide Person-Centered Care  Flowsheets (Taken 10/28/2022 1513)  Trust Relationship/Rapport:   reassurance provided   care explained   choices provided   thoughts/feelings acknowledged   emotional support provided   empathic listening provided   questions answered   questions encouraged

## 2022-11-18 DIAGNOSIS — J01.10 ACUTE FRONTAL SINUSITIS, RECURRENCE NOT SPECIFIED: Primary | ICD-10-CM

## 2022-11-18 RX ORDER — AMOXICILLIN AND CLAVULANATE POTASSIUM 875; 125 MG/1; MG/1
1 TABLET, FILM COATED ORAL 2 TIMES DAILY
Qty: 14 TABLET | Refills: 0 | Status: SHIPPED | OUTPATIENT
Start: 2022-11-18 | End: 2023-01-19

## 2023-01-19 ENCOUNTER — OFFICE VISIT (OUTPATIENT)
Dept: OPHTHALMOLOGY | Facility: CLINIC | Age: 53
End: 2023-01-19
Payer: COMMERCIAL

## 2023-01-19 DIAGNOSIS — D31.32 CHOROIDAL NEVUS OF BOTH EYES: Primary | ICD-10-CM

## 2023-01-19 DIAGNOSIS — H52.4 REGULAR ASTIGMATISM WITH PRESBYOPIA, BILATERAL: ICD-10-CM

## 2023-01-19 DIAGNOSIS — H52.223 REGULAR ASTIGMATISM WITH PRESBYOPIA, BILATERAL: ICD-10-CM

## 2023-01-19 DIAGNOSIS — D31.31 CHOROIDAL NEVUS OF BOTH EYES: Primary | ICD-10-CM

## 2023-01-19 PROCEDURE — 92004 PR EYE EXAM, NEW PATIENT,COMPREHESV: ICD-10-PCS | Mod: S$GLB,,, | Performed by: OPTOMETRIST

## 2023-01-19 PROCEDURE — 99999 PR PBB SHADOW E&M-EST. PATIENT-LVL II: ICD-10-PCS | Mod: PBBFAC,,, | Performed by: OPTOMETRIST

## 2023-01-19 PROCEDURE — 92004 COMPRE OPH EXAM NEW PT 1/>: CPT | Mod: S$GLB,,, | Performed by: OPTOMETRIST

## 2023-01-19 PROCEDURE — 99999 PR PBB SHADOW E&M-EST. PATIENT-LVL II: CPT | Mod: PBBFAC,,, | Performed by: OPTOMETRIST

## 2023-01-20 NOTE — PROGRESS NOTES
HPI     Annual Exam            Comments: New patient to [facility]   Patient here for routine eye exam           Comments    Vision changes since last eye exam?: Yes, at near      Any eye pain today: None    Other ocular symptoms: None    Interested in contact lens fitting today? No             Last edited by Leyla Gutierres MA on 1/19/2023  3:55 PM.            Assessment /Plan     For exam results, see Encounter Report.    Choroidal nevus of both eyes  Nevus appears flat at this time with no lipo. Observe. Recommend repeat Optos imaging taken at [facility] to compare for change. RTC at [facility] next available for repeat imaging.     Regular astigmatism with presbyopia, bilateral  Doing well with OTC readers PRN.     RTC at [facility] clinic for optos due to nevus.

## 2023-01-24 ENCOUNTER — OFFICE VISIT (OUTPATIENT)
Dept: PRIMARY CARE CLINIC | Facility: CLINIC | Age: 53
End: 2023-01-24
Payer: COMMERCIAL

## 2023-01-24 DIAGNOSIS — E03.9 HYPOTHYROIDISM, UNSPECIFIED TYPE: ICD-10-CM

## 2023-01-24 DIAGNOSIS — E88.819 INSULIN RESISTANCE: Primary | ICD-10-CM

## 2023-01-24 DIAGNOSIS — E66.2 CLASS 1 OBESITY WITH ALVEOLAR HYPOVENTILATION, SERIOUS COMORBIDITY, AND BODY MASS INDEX (BMI) OF 33.0 TO 33.9 IN ADULT: ICD-10-CM

## 2023-01-24 DIAGNOSIS — I10 HYPERTENSION, UNSPECIFIED TYPE: ICD-10-CM

## 2023-01-24 DIAGNOSIS — Z82.49 FAMILY HISTORY OF PREMATURE CAD: ICD-10-CM

## 2023-01-24 DIAGNOSIS — K21.9 GASTROESOPHAGEAL REFLUX DISEASE, UNSPECIFIED WHETHER ESOPHAGITIS PRESENT: ICD-10-CM

## 2023-01-24 DIAGNOSIS — E61.1 IRON DEFICIENCY: ICD-10-CM

## 2023-01-24 DIAGNOSIS — E78.49 OTHER HYPERLIPIDEMIA: ICD-10-CM

## 2023-01-24 DIAGNOSIS — E88.810 METABOLIC SYNDROME: ICD-10-CM

## 2023-01-24 PROCEDURE — 99214 OFFICE O/P EST MOD 30 MIN: CPT | Mod: 95,,, | Performed by: FAMILY MEDICINE

## 2023-01-24 PROCEDURE — 99214 PR OFFICE/OUTPT VISIT, EST, LEVL IV, 30-39 MIN: ICD-10-PCS | Mod: 95,,, | Performed by: FAMILY MEDICINE

## 2023-01-24 NOTE — PROGRESS NOTES
Subjective:       Patient ID: Mable Redding is a 52 y.o. female.  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  Sees a good many bariatric patients    The patient location is: home/[place]  The chief complaint leading to consultation is: weight/gain    Visit type: audiovisual    Face to Face time with patient: 48  50 minutes of total time spent on the encounter, which includes face to face time and non-face to face time preparing to see the patient (eg, review of tests), Obtaining and/or reviewing separately obtained history, Documenting clinical information in the electronic or other health record, Independently interpreting results (not separately reported) and communicating results to the patient/family/caregiver, or Care coordination (not separately reported).       Each patient to whom he or she provides medical services by telemedicine is:  (1) informed of the relationship between the physician and patient and the respective role of any other health care provider with respect to management of the patient; and (2) notified that he or she may decline to receive medical services by telemedicine and may withdraw from such care at any time.    Notes:    Chief Complaint: weight gain, chronic medical problems  Patient Active Problem List   Diagnosis    Iatrogenic pulmonary embolus and infarction    Shortness of breath    Palpitation    High coronary artery calcium score    Family history of premature CAD    Nipple lesion    Choroidal nevus of both eyes    Hot flashes due to menopause    Decreased strength, endurance, and mobility    History of 2019 novel coronavirus disease (COVID-19)    Abnormal PFT    Acute medial meniscus tear  of left knee    Depression    GERD (gastroesophageal reflux disease)    HTN (hypertension)    Acute pain of left knee    Decreased range of motion of left knee    Weakness of left lower extremity    Iron deficiency    Other hyperlipidemia    Hypothyroidism      Chronic hip pain and L knee pain; 60% meniscus removed    On coumadin x 13 years; was thought to be provoked from ocp. Had transient lupus anticoagulant. Had old infarct so some intermittent sob.   Had echo neg pulm HTN and cardiac w/up. Sees Kristen Bo.    Pt would like to discuss weight management. Most recent weight 207. 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    In the past was working out, considered lap band. Went to work up and had to have thyroid addressed. Pt preferred to have thyroid w/up first. Years ago PCP considered vyvanse for BED. Pt lost 60 lb but had involuntary movement and irritability.     Pt reports hx of BED. She has had no official diagnosis from psych. Once starts eating craves more food. Wants something sweet. Describes more bored eating and comfort eating. Sometimes eats all day.     Pt has hx of insulin resistance; was on metformin for a while; also tried byetta--had abd cramping and nausea.    Pt cannot be on HRT/is on low dose effexor for hot flashes. Now on 150 mg. It has helped significantly.     Meds tried:  Adipex   Metformin  Vyvanse  Byetta  Wellbutin--did not help    Pt would like to consider medication as well to help with jump start. Needs help with suppression of cravings in afternoons.       Food recall:   Bfast-skips but gets a mochasippie/jose/egg/cheese muffin  Lunch: plate lunch/wherever is close to (today [employer]: bbq chicken, green beans, potatoes)  Boiled shrimp salad, boudin balls  Dinner: tries to have at home, [employer] salads   Barely nuggets    When in [place]:  Scrambled eggs,     Overall limited bread  Dinner: cereal  Beef      Likes salads and sweet potatoes. Pt/ are not  picky eaters.       HPI  Review of Systems   Constitutional:  Negative for activity change, appetite change, fatigue and fever.   HENT:  Negative for mouth dryness and goiter.    Eyes:  Negative for visual disturbance.   Respiratory:  Negative for apnea, cough, chest tightness and shortness of breath.    Cardiovascular:  Negative for chest pain, palpitations and leg swelling.   Gastrointestinal:  Negative for abdominal pain, constipation and diarrhea.   Endocrine: Negative for cold intolerance, heat intolerance, polydipsia, polyphagia and polyuria.   Genitourinary:  Negative for frequency and menstrual problem.   Musculoskeletal:  Negative for arthralgias and myalgias.   Integumentary:  Negative for color change and rash.   Psychiatric/Behavioral:  Negative for sleep disturbance. The patient is not nervous/anxious.        Objective:      Physical Exam  Constitutional:       General: She is not in acute distress.     Appearance: Normal appearance.   HENT:      Head: Normocephalic and atraumatic.   Eyes:      General: No scleral icterus.  Pulmonary:      Effort: Pulmonary effort is normal. No respiratory distress.   Neurological:      Mental Status: She is alert and oriented to person, place, and time.   Psychiatric:         Mood and Affect: Mood normal.         Behavior: Behavior normal.       Assessment:         1. Insulin resistance    2. Hypertension, unspecified type    3. Family history of premature CAD    4. Gastroesophageal reflux disease, unspecified whether esophagitis present    5. Other hyperlipidemia    6. Hypothyroidism, unspecified type    7. Iron deficiency    8. Metabolic syndrome    9. Class 1 obesity with alveolar hypoventilation, serious comorbidity, and body mass index (BMI) of 33.0 to 33.9 in adult              Plan:       Insulin resistance  -     phentermine (LOMAIRA) 8 mg Tab; Take 1 tablet by mouth 2 (two) times daily as needed (appetite).  Dispense: 60 each; Refill: 0    Hypertension,  unspecified type  Chronic/stable    Family history of premature CAD    Gastroesophageal reflux disease, unspecified whether esophagitis present  Stable    Other hyperlipidemia    Hypothyroidism, unspecified type  Chronic/stable    Iron deficiency    Metabolic syndrome  -     phentermine (LOMAIRA) 8 mg Tab; Take 1 tablet by mouth 2 (two) times daily as needed (appetite).  Dispense: 60 each; Refill: 0    Class 1 obesity with alveolar hypoventilation, serious comorbidity, and body mass index (BMI) of 33.0 to 33.9 in adult  -     phentermine (LOMAIRA) 8 mg Tab; Take 1 tablet by mouth 2 (two) times daily as needed (appetite).  Dispense: 60 each; Refill: 0        DW pt full plate living/ options for increasing fiber/veggies  Trial lomaira; pt directed online for coupon   Pt to send food log; has room for significant improvement in food choices--will work toward this over next few weeks  Short term follow up

## 2023-01-25 ENCOUNTER — OFFICE VISIT (OUTPATIENT)
Dept: URGENT CARE | Facility: CLINIC | Age: 53
End: 2023-01-25
Payer: COMMERCIAL

## 2023-01-25 VITALS
WEIGHT: 209 LBS | BODY MASS INDEX: 33.59 KG/M2 | DIASTOLIC BLOOD PRESSURE: 79 MMHG | HEIGHT: 66 IN | OXYGEN SATURATION: 96 % | TEMPERATURE: 98 F | SYSTOLIC BLOOD PRESSURE: 122 MMHG | HEART RATE: 99 BPM | RESPIRATION RATE: 16 BRPM

## 2023-01-25 DIAGNOSIS — R53.83 MALAISE AND FATIGUE: ICD-10-CM

## 2023-01-25 DIAGNOSIS — Z11.59 SCREENING FOR VIRAL DISEASE: Primary | ICD-10-CM

## 2023-01-25 DIAGNOSIS — R68.83 CHILLS: ICD-10-CM

## 2023-01-25 DIAGNOSIS — R53.81 MALAISE AND FATIGUE: ICD-10-CM

## 2023-01-25 DIAGNOSIS — J30.9 ALLERGIC RHINITIS WITH POSTNASAL DRIP: ICD-10-CM

## 2023-01-25 DIAGNOSIS — R09.82 ALLERGIC RHINITIS WITH POSTNASAL DRIP: ICD-10-CM

## 2023-01-25 DIAGNOSIS — R52 GENERALIZED BODY ACHES: ICD-10-CM

## 2023-01-25 PROBLEM — E66.811 CLASS 1 OBESITY WITH ALVEOLAR HYPOVENTILATION AND SERIOUS COMORBIDITY IN ADULT: Status: ACTIVE | Noted: 2023-01-25

## 2023-01-25 PROBLEM — E66.2 CLASS 1 OBESITY WITH ALVEOLAR HYPOVENTILATION AND SERIOUS COMORBIDITY IN ADULT: Status: ACTIVE | Noted: 2023-01-25

## 2023-01-25 LAB
CTP QC/QA: YES
CTP QC/QA: YES
POC MOLECULAR INFLUENZA A AGN: NEGATIVE
POC MOLECULAR INFLUENZA B AGN: NEGATIVE
SARS-COV-2 AG RESP QL IA.RAPID: NEGATIVE

## 2023-01-25 PROCEDURE — 96372 PR INJECTION,THERAP/PROPH/DIAG2ST, IM OR SUBCUT: ICD-10-PCS | Mod: S$GLB,,, | Performed by: EMERGENCY MEDICINE

## 2023-01-25 PROCEDURE — 96372 THER/PROPH/DIAG INJ SC/IM: CPT | Mod: S$GLB,,, | Performed by: EMERGENCY MEDICINE

## 2023-01-25 PROCEDURE — 87811 SARS CORONAVIRUS 2 ANTIGEN POCT, MANUAL READ: ICD-10-PCS | Mod: QW,S$GLB,, | Performed by: PHYSICIAN ASSISTANT

## 2023-01-25 PROCEDURE — 99214 OFFICE O/P EST MOD 30 MIN: CPT | Mod: 25,S$GLB,, | Performed by: PHYSICIAN ASSISTANT

## 2023-01-25 PROCEDURE — 99214 PR OFFICE/OUTPT VISIT, EST, LEVL IV, 30-39 MIN: ICD-10-PCS | Mod: 25,S$GLB,, | Performed by: PHYSICIAN ASSISTANT

## 2023-01-25 PROCEDURE — 87811 SARS-COV-2 COVID19 W/OPTIC: CPT | Mod: QW,S$GLB,, | Performed by: PHYSICIAN ASSISTANT

## 2023-01-25 PROCEDURE — 87502 POCT INFLUENZA A/B MOLECULAR: ICD-10-PCS | Mod: QW,S$GLB,, | Performed by: PHYSICIAN ASSISTANT

## 2023-01-25 PROCEDURE — 87502 INFLUENZA DNA AMP PROBE: CPT | Mod: QW,S$GLB,, | Performed by: PHYSICIAN ASSISTANT

## 2023-01-25 RX ORDER — KETOROLAC TROMETHAMINE 30 MG/ML
30 INJECTION, SOLUTION INTRAMUSCULAR; INTRAVENOUS ONCE
Status: COMPLETED | OUTPATIENT
Start: 2023-01-25 | End: 2023-01-25

## 2023-01-25 RX ADMIN — KETOROLAC TROMETHAMINE 30 MG: 30 INJECTION, SOLUTION INTRAMUSCULAR; INTRAVENOUS at 02:01

## 2023-01-25 NOTE — LETTER
[id number]  [facility] [address]  Phone: [phone]          Return to Work/School    Patient: Mable Redding  YOB: 1970   Date: 01/25/2023     To Whom It May Concern:     Mable Redding was in contact with/seen in my office on 01/25/2023. COVID-19 is present in our communities across the state. There is limited testing for COVID at this time, so not all patients can be tested. In this situation, your employee meets the following criteria:     Mable Redding has met the criteria for COVID-19 testing and has a NEGATIVE result. The employee can return to work once they are asymptomatic for 24 hours without the use of fever reducing medications (Tylenol, Motrin, etc). Return to work on Monday January 30th.     If you have any questions or concerns, or if I can be of further assistance, please do not hesitate to contact me.     Sincerely,    Inés Nevarez PA-C

## 2023-01-25 NOTE — PROGRESS NOTES
"Subjective:       Patient ID: Mable Redding is a 52 y.o. female.    Vitals:  height is 5' 6" (1.676 m) and weight is 94.8 kg (209 lb). Her tympanic temperature is 98.4 °F (36.9 °C). Her blood pressure is 122/79 and her pulse is 99. Her respiration is 16 and oxygen saturation is 96%.     Chief Complaint: Chills (Body aches, nausea)    Other  This is a new problem. The current episode started yesterday. The problem occurs intermittently. The problem has been gradually worsening. Associated symptoms include chills, congestion and headaches. Pertinent negatives include no coughing or fever.     Constitution: Positive for chills. Negative for fever and international travel in last 60 days.   HENT:  Positive for congestion and postnasal drip.    Neck: neck negative.   Cardiovascular: Negative.    Eyes: Negative.    Respiratory:  Negative for cough.    Genitourinary: Negative.    Musculoskeletal: Negative.    Skin: Negative.    Allergic/Immunologic: Negative.    Neurological:  Positive for headaches. Negative for dizziness.     Objective:      Vitals:    01/25/23 1426   BP: 122/79   BP Location: Left arm   Patient Position: Sitting   BP Method: Medium (Automatic)   Pulse: 99   Resp: 16   Temp: 98.4 °F (36.9 °C)   TempSrc: Tympanic   SpO2: 96%   Weight: 94.8 kg (209 lb)   Height: 5' 6" (1.676 m)       Physical Exam   Constitutional: She is oriented to person, place, and time. She appears well-developed. She is cooperative.  Non-toxic appearance. She appears ill. No distress. awake  HENT:   Head: Normocephalic and atraumatic.   Ears:   Right Ear: Hearing, tympanic membrane, external ear and ear canal normal. No middle ear effusion.   Left Ear: Hearing, tympanic membrane, external ear and ear canal normal.  No middle ear effusion.   Nose: Rhinorrhea and congestion present.   Mouth/Throat: Mucous membranes are moist. No oropharyngeal exudate or posterior oropharyngeal erythema. Oropharynx is clear.   Eyes: Right eye " visual fields normal and left eye visual fields normal. Conjunctivae and EOM are normal. Pupils are equal, round, and reactive to light. Right eye exhibits discharge. Left eye exhibits discharge. No scleral icterus. Right eye exhibits no nystagmus. Left eye exhibits no nystagmus. Extraocular movement intact vision grossly intact gaze aligned appropriately periorbital hyperpigmentation   Neck: Trachea normal. Neck supple. No Brudzinski's sign and no Kernig's sign noted. No neck rigidity present.   Cardiovascular: Normal rate, regular rhythm, normal heart sounds and normal pulses.   Pulmonary/Chest: Effort normal and breath sounds normal. No accessory muscle usage or stridor. No respiratory distress. She has no wheezes. She has no rales. She exhibits no tenderness.   Abdominal: Bowel sounds are normal. She exhibits no distension. Soft. There is no guarding.   Musculoskeletal:      Right lower leg: No edema.      Left lower leg: No edema.   Lymphadenopathy:     She has no cervical adenopathy.   Neurological: She is alert, oriented to person, place, and time and at baseline.   Skin: Skin is warm, dry, not diaphoretic and no rash. Capillary refill takes less than 2 seconds.   Psychiatric: She experiences Normal attention and Normal perception. Her speech is normal and behavior is normal. Mood, memory, affect, judgment and thought content normal. Cognition normal  Nursing note and vitals reviewed.      Assessment:       1. Screening for viral disease    2. Generalized body aches    3. Allergic rhinitis with postnasal drip    4. Malaise and fatigue    5. Chills          Results for orders placed or performed in visit on 01/25/23   SARS Coronavirus 2 Antigen, POCT Manual Read   Result Value Ref Range    SARS Coronavirus 2 Antigen Negative Negative     Acceptable Yes    POCT Influenza A/B Molecular   Result Value Ref Range    POC Molecular Influenza A Ag Negative Negative, Not Reported    POC Molecular  Influenza B Ag Negative Negative, Not Reported     Acceptable Yes          Plan:         Screening for viral disease  -     SARS Coronavirus 2 Antigen, POCT Manual Read  -     POCT Influenza A/B Molecular    Generalized body aches  -     ketorolac injection 30 mg    Allergic rhinitis with postnasal drip    Malaise and fatigue    Chills           Medical Decision Making:   Initial Assessment:   Symptoms just started yesterday.  Denies adequate water intake but but it also denies  symptoms. Patient reports that she does not feel well.  Tried taking a Toradol tablet with temporary relief of body aches.  Requesting COVID and flu test today.  COVID and flu test negative; patient should repeat COVID testing on Friday at home if she is not feeling better.  Work note granted until Monday.  Clinical Tests:   Lab Tests: Ordered and Reviewed       <> Summary of Lab: COVID and flu negative today  Urgent Care Management:  IM TORADOL:   30 mg Toradol injection given in clinic today.  Patient denied any kidney, liver, or GI issues.       Patient Instructions   If you are not getting better, please repeat home COVID test on Friday.    VIRAL URI: OVER THE COUNTER RECOMMENDATIONS/SUGGESTIONS--if needed      SORE THROAT:    You may gargle with hot salt water 4 times a day for the next 2 days and then you may also gargle diluted hydrogen peroxide once to twice daily to alleviate some of your throat discomfort.  Drink plenty of fluids, recommend warm tea with honey.     YOU MAY USE OVER-THE-COUNTER CEPACOL FOR SOOTHING OF YOUR THROAT.  You may wish to avoid spicy food, citrus fruits, and red sauces- as this may irritate the throat more.    You can also take a daily anti-histamine such as Zyrtec, Claritin, Xyzal, OR Allegra-IN DAYTIME; NON DROWSY) AND/OR Benadryl- AT NIGHT; DROWSY) to help with runny nose/sneezing/sore throat/cough. Remember to switch antihistamines every 3 months, if taken daily.     COUGH:      Make  sure you are getting rest and drinking lots of fluids.    You can use cough drops (recommend ricola lemon mint honey) or Cepacol to soothe your sore throat.     You can also take Elderberry and/or Emergen-C (vitamin C) to help boost your immune system.     You may use any of the over-the-counter cough suppressant combination medications such as: NyQuil, DayQuil, Mucinex (guaifenesin), Robitussin, Delsym (Bromfed), TheraFlu  Note:   -pseudoephedrine (behind the counter) is a decongestant. Pseudoephedrine 30 mg up to 240 mg/day. *BE AWARE- It can raise your blood pressure and give you palpitations.  -Mucinex (guaifenisin) is to break up mucus up to 2400mg/day to loosen any mucous.   -Mucinex DM pill has a cough suppressant that can be sedating. It can be used at night to stop the tickle at the back of your throat.  -Mucinex D (it has guaifenesin and a high dose of pseudoephedrine) which could be helpful in the mornings to help decongest.  -Nyquil at night is beneficial to help you get some rest, however it is sedating and it does have an antihistamine and tylenol.  -- you may use over-the-counter Coricidin HBP in the event that you have a history of high blood pressure    Honey is a natural cough suppressant that can be used.    If your symptoms do not improve, you should return to this clinic. If your symptoms worsen, go to the emergency room.         CONGESTION:  Make sure to stay well hydrated.    Use Nasal Saline to mechanically move any post nasal drip from your eustachian tube or from the back of your throat.    You may insert a whole garlic cloves into your nostrils and leave for 10-15 minutes. When you remove them, mucus will be pulled down. This may burn as garlic is strong.  Repeat as often as needed and able to tolerate.  Please do not use garlic if you have an allergy to garlic.      PAIN/DISCOMFORT:  Tylenol up to 4,000 mg a day is safe for short periods and can be used for headache, body aches, pain, and  fever. However in high doses and prolonged use it can cause liver irritation.    Ibuprofen is a non-steroidal anti-inflammatory that can be used for headache, body aches, pain, and fever. However it can also cause stomach irritation if over used.      - You must understand that you have received an Urgent Care treatment only and that you may be released before all of your medical problems are known or treated.   - You, the patient, will arrange for follow up care as instructed with your primary care provider or recommended specialist.   - If your condition worsens or fails to improve we recommend that you receive another evaluation at the ER immediately or contact your PCP to discuss your concerns, or return here.   - Please do not drive or make any important decisions for 24 hours if you have received any pain medications, sedatives or mood altering drugs during your visit.    Disclaimer: This document was drafted with the use of a voice recognition device and is likely to have sound alike errors.

## 2023-01-25 NOTE — PATIENT INSTRUCTIONS
If you are not getting better, please repeat home COVID test on Friday.    VIRAL URI: OVER THE COUNTER RECOMMENDATIONS/SUGGESTIONS--if needed      SORE THROAT:    You may gargle with hot salt water 4 times a day for the next 2 days and then you may also gargle diluted hydrogen peroxide once to twice daily to alleviate some of your throat discomfort.  Drink plenty of fluids, recommend warm tea with honey.     YOU MAY USE OVER-THE-COUNTER CEPACOL FOR SOOTHING OF YOUR THROAT.  You may wish to avoid spicy food, citrus fruits, and red sauces- as this may irritate the throat more.    You can also take a daily anti-histamine such as Zyrtec, Claritin, Xyzal, OR Allegra-IN DAYTIME; NON DROWSY) AND/OR Benadryl- AT NIGHT; DROWSY) to help with runny nose/sneezing/sore throat/cough. Remember to switch antihistamines every 3 months, if taken daily.     COUGH:      Make sure you are getting rest and drinking lots of fluids.    You can use cough drops (recommend ricola lemon mint honey) or Cepacol to soothe your sore throat.     You can also take Elderberry and/or Emergen-C (vitamin C) to help boost your immune system.     You may use any of the over-the-counter cough suppressant combination medications such as: NyQuil, DayQuil, Mucinex (guaifenesin), Robitussin, Delsym (Bromfed), TheraFlu  Note:   -pseudoephedrine (behind the counter) is a decongestant. Pseudoephedrine 30 mg up to 240 mg/day. *BE AWARE- It can raise your blood pressure and give you palpitations.  -Mucinex (guaifenisin) is to break up mucus up to 2400mg/day to loosen any mucous.   -Mucinex DM pill has a cough suppressant that can be sedating. It can be used at night to stop the tickle at the back of your throat.  -Mucinex D (it has guaifenesin and a high dose of pseudoephedrine) which could be helpful in the mornings to help decongest.  -Nyquil at night is beneficial to help you get some rest, however it is sedating and it does have an antihistamine and  tylenol.  -- you may use over-the-counter Coricidin HBP in the event that you have a history of high blood pressure    Honey is a natural cough suppressant that can be used.    If your symptoms do not improve, you should return to this clinic. If your symptoms worsen, go to the emergency room.         CONGESTION:  Make sure to stay well hydrated.    Use Nasal Saline to mechanically move any post nasal drip from your eustachian tube or from the back of your throat.    You may insert a whole garlic cloves into your nostrils and leave for 10-15 minutes. When you remove them, mucus will be pulled down. This may burn as garlic is strong.  Repeat as often as needed and able to tolerate.  Please do not use garlic if you have an allergy to garlic.      PAIN/DISCOMFORT:  Tylenol up to 4,000 mg a day is safe for short periods and can be used for headache, body aches, pain, and fever. However in high doses and prolonged use it can cause liver irritation.    Ibuprofen is a non-steroidal anti-inflammatory that can be used for headache, body aches, pain, and fever. However it can also cause stomach irritation if over used.      - You must understand that you have received an Urgent Care treatment only and that you may be released before all of your medical problems are known or treated.   - You, the patient, will arrange for follow up care as instructed with your primary care provider or recommended specialist.   - If your condition worsens or fails to improve we recommend that you receive another evaluation at the ER immediately or contact your PCP to discuss your concerns, or return here.   - Please do not drive or make any important decisions for 24 hours if you have received any pain medications, sedatives or mood altering drugs during your visit.    Disclaimer: This document was drafted with the use of a voice recognition device and is likely to have sound alike errors.

## 2023-02-10 DIAGNOSIS — M25.551 HIP PAIN, ACUTE, RIGHT: Primary | ICD-10-CM

## 2023-02-10 RX ORDER — KETOROLAC TROMETHAMINE 10 MG/1
10 TABLET, FILM COATED ORAL EVERY 6 HOURS
Qty: 20 TABLET | Refills: 0 | Status: SHIPPED | OUTPATIENT
Start: 2023-02-10 | End: 2023-02-15

## 2023-02-14 ENCOUNTER — OFFICE VISIT (OUTPATIENT)
Dept: PRIMARY CARE CLINIC | Facility: CLINIC | Age: 53
End: 2023-02-14
Payer: COMMERCIAL

## 2023-02-14 DIAGNOSIS — E88.810 METABOLIC SYNDROME: ICD-10-CM

## 2023-02-14 DIAGNOSIS — E88.819 INSULIN RESISTANCE: Primary | ICD-10-CM

## 2023-02-14 DIAGNOSIS — E66.09 CLASS 1 OBESITY DUE TO EXCESS CALORIES WITH SERIOUS COMORBIDITY AND BODY MASS INDEX (BMI) OF 33.0 TO 33.9 IN ADULT: ICD-10-CM

## 2023-02-14 PROCEDURE — 99213 PR OFFICE/OUTPT VISIT, EST, LEVL III, 20-29 MIN: ICD-10-PCS | Mod: 95,,, | Performed by: FAMILY MEDICINE

## 2023-02-14 PROCEDURE — 99213 OFFICE O/P EST LOW 20 MIN: CPT | Mod: 95,,, | Performed by: FAMILY MEDICINE

## 2023-02-14 RX ORDER — METFORMIN HYDROCHLORIDE 500 MG/1
500 TABLET, EXTENDED RELEASE ORAL 2 TIMES DAILY WITH MEALS
Qty: 60 TABLET | Refills: 2 | Status: SHIPPED | OUTPATIENT
Start: 2023-02-14 | End: 2023-05-09

## 2023-02-14 NOTE — PROGRESS NOTES
Subjective:       Patient ID: Mable Redding is a 52 y.o. female.    Pmhx, fam hx, soc hx, surg hx, allergies, med list reviewed    The patient location is: home/[place]  The chief complaint leading to consultation is: follow up    Visit type: audiovisual    Face to Face time with patient: 22  25 minutes of total time spent on the encounter, which includes face to face time and non-face to face time preparing to see the patient (eg, review of tests), Obtaining and/or reviewing separately obtained history, Documenting clinical information in the electronic or other health record, Independently interpreting results (not separately reported) and communicating results to the patient/family/caregiver, or Care coordination (not separately reported).         Each patient to whom he or she provides medical services by telemedicine is:  (1) informed of the relationship between the physician and patient and the respective role of any other health care provider with respect to management of the patient; and (2) notified that he or she may decline to receive medical services by telemedicine and may withdraw from such care at any time.    Notes:     Pt reports was taking the lomaira bid. Did not notice any reduction in appetite.   Pt has changed to lighter salad dressings as well as less starches. She is also trying to avoid sweets.   Has had a craving for sugar/sweets. Tries fruit instead. She will do portion control. Sometimes is an hour after. Pt felt overall more tired and hungry on the Lomaira.    Has tried multiple medications. Did recall did okay with 500 mg of metformin.   Some constipation so would like to try metformin.    No exercise, still eating some sugars. 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      Review of Systems   Constitutional:  Negative for activity change, appetite change, fatigue and fever.   HENT:  Negative for mouth dryness and goiter.    Eyes:  Negative for visual disturbance.    Respiratory:  Negative for apnea, cough, chest tightness and shortness of breath.    Cardiovascular:  Negative for chest pain, palpitations and leg swelling.   Gastrointestinal:  Negative for abdominal pain, constipation and diarrhea.   Endocrine: Negative for cold intolerance, heat intolerance, polydipsia, polyphagia and polyuria.   Genitourinary:  Negative for frequency and menstrual problem.   Musculoskeletal:  Negative for arthralgias and myalgias.   Integumentary:  Negative for color change and rash.   Psychiatric/Behavioral:  Negative for sleep disturbance. The patient is not nervous/anxious.        Objective:      Physical Exam  Constitutional:       General: She is not in acute distress.     Appearance: Normal appearance.   HENT:      Head: Normocephalic and atraumatic.   Eyes:      General: No scleral icterus.  Pulmonary:      Effort: Pulmonary effort is normal. No respiratory distress.   Neurological:      Mental Status: She is alert and oriented to person, place, and time.   Psychiatric:         Mood and Affect: Mood normal.         Behavior: Behavior normal.       Assessment:           ICD-10-CM ICD-9-CM   1. Insulin resistance  E88.81 277.7   2. Metabolic syndrome  E88.81 277.7   3. Class 1 obesity due to excess calories with serious comorbidity and body mass index (BMI) of 33.0 to 33.9 in adult  E66.09 278.00    Z68.33 V85.33      Plan:       Insulin resistance  -     metFORMIN (GLUCOPHAGE-XR) 500 MG ER 24hr tablet; Take 1 tablet (500 mg total) by mouth 2 (two) times daily with meals.  Dispense: 60 tablet; Refill: 2  DW pt risks/benefits/se's--has had in the past    Metabolic syndrome  -     metFORMIN (GLUCOPHAGE-XR) 500 MG ER 24hr tablet; Take 1 tablet (500 mg total) by mouth 2 (two) times daily with meals.  Dispense: 60 tablet; Refill: 2    Class 1 obesity due to excess calories with serious comorbidity and body mass index (BMI) of 33.0 to 33.9 in adult  Pt to schedule online  Continue lifestyle  interventions  Goal next visit: 1 per sanders

## 2023-02-15 ENCOUNTER — PATIENT MESSAGE (OUTPATIENT)
Dept: ORTHOPEDICS | Facility: CLINIC | Age: 53
End: 2023-02-15
Payer: COMMERCIAL

## 2023-02-15 DIAGNOSIS — M17.12 PRIMARY OSTEOARTHRITIS OF LEFT KNEE: Primary | ICD-10-CM

## 2023-03-13 RX ORDER — FLUCONAZOLE 150 MG/1
150 TABLET ORAL ONCE
Qty: 2 TABLET | Refills: 0 | Status: SHIPPED | OUTPATIENT
Start: 2023-03-13 | End: 2023-06-26 | Stop reason: SDUPTHER

## 2023-04-03 DIAGNOSIS — B37.31 VAGINAL CANDIDIASIS: Primary | ICD-10-CM

## 2023-04-03 RX ORDER — FLUCONAZOLE 150 MG/1
150 TABLET ORAL DAILY
Qty: 3 TABLET | Refills: 1 | Status: SHIPPED | OUTPATIENT
Start: 2023-04-03 | End: 2023-05-09

## 2023-04-14 ENCOUNTER — PATIENT MESSAGE (OUTPATIENT)
Dept: ORTHOPEDICS | Facility: CLINIC | Age: 53
End: 2023-04-14
Payer: COMMERCIAL

## 2023-05-01 ENCOUNTER — TELEPHONE (OUTPATIENT)
Dept: PRIMARY CARE CLINIC | Facility: CLINIC | Age: 53
End: 2023-05-01
Payer: COMMERCIAL

## 2023-05-01 DIAGNOSIS — E28.0 HYPERESTROGENISM: ICD-10-CM

## 2023-05-01 DIAGNOSIS — E03.9 HYPOTHYROIDISM, UNSPECIFIED TYPE: ICD-10-CM

## 2023-05-01 DIAGNOSIS — E78.49 OTHER HYPERLIPIDEMIA: ICD-10-CM

## 2023-05-01 DIAGNOSIS — E88.810 METABOLIC SYNDROME: ICD-10-CM

## 2023-05-01 DIAGNOSIS — I10 HYPERTENSION, UNSPECIFIED TYPE: Primary | ICD-10-CM

## 2023-05-09 ENCOUNTER — OFFICE VISIT (OUTPATIENT)
Dept: PRIMARY CARE CLINIC | Facility: CLINIC | Age: 53
End: 2023-05-09
Payer: COMMERCIAL

## 2023-05-09 DIAGNOSIS — Z82.49 FAMILY HISTORY OF PREMATURE CAD: ICD-10-CM

## 2023-05-09 DIAGNOSIS — I10 HYPERTENSION, UNSPECIFIED TYPE: ICD-10-CM

## 2023-05-09 DIAGNOSIS — E78.41 ELEVATED LIPOPROTEIN A LEVEL: ICD-10-CM

## 2023-05-09 DIAGNOSIS — R93.1 HIGH CORONARY ARTERY CALCIUM SCORE: Primary | ICD-10-CM

## 2023-05-09 DIAGNOSIS — N95.1 MENOPAUSAL SYMPTOMS: ICD-10-CM

## 2023-05-09 DIAGNOSIS — E16.1 HYPERINSULINISM: ICD-10-CM

## 2023-05-09 DIAGNOSIS — E03.9 HYPOTHYROIDISM, UNSPECIFIED TYPE: ICD-10-CM

## 2023-05-09 PROBLEM — R53.1 DECREASED STRENGTH, ENDURANCE, AND MOBILITY: Status: RESOLVED | Noted: 2021-10-01 | Resolved: 2023-05-09

## 2023-05-09 PROBLEM — M25.662 DECREASED RANGE OF MOTION OF LEFT KNEE: Status: RESOLVED | Noted: 2022-05-30 | Resolved: 2023-05-09

## 2023-05-09 PROBLEM — R68.89 DECREASED STRENGTH, ENDURANCE, AND MOBILITY: Status: RESOLVED | Noted: 2021-10-01 | Resolved: 2023-05-09

## 2023-05-09 PROBLEM — S83.242A ACUTE MEDIAL MENISCUS TEAR OF LEFT KNEE: Status: RESOLVED | Noted: 2022-05-17 | Resolved: 2023-05-09

## 2023-05-09 PROBLEM — R29.898 WEAKNESS OF LEFT LOWER EXTREMITY: Status: RESOLVED | Noted: 2022-05-30 | Resolved: 2023-05-09

## 2023-05-09 PROBLEM — Z86.16 HISTORY OF 2019 NOVEL CORONAVIRUS DISEASE (COVID-19): Status: RESOLVED | Noted: 2021-11-03 | Resolved: 2023-05-09

## 2023-05-09 PROBLEM — Z74.09 DECREASED STRENGTH, ENDURANCE, AND MOBILITY: Status: RESOLVED | Noted: 2021-10-01 | Resolved: 2023-05-09

## 2023-05-09 PROBLEM — N64.9 NIPPLE LESION: Status: RESOLVED | Noted: 2019-08-15 | Resolved: 2023-05-09

## 2023-05-09 PROBLEM — R06.02 SHORTNESS OF BREATH: Status: RESOLVED | Noted: 2019-01-30 | Resolved: 2023-05-09

## 2023-05-09 PROBLEM — M25.562 ACUTE PAIN OF LEFT KNEE: Status: RESOLVED | Noted: 2022-05-30 | Resolved: 2023-05-09

## 2023-05-09 PROCEDURE — 99213 OFFICE O/P EST LOW 20 MIN: CPT | Mod: 95,,, | Performed by: FAMILY MEDICINE

## 2023-05-09 PROCEDURE — 99213 PR OFFICE/OUTPT VISIT, EST, LEVL III, 20-29 MIN: ICD-10-PCS | Mod: 95,,, | Performed by: FAMILY MEDICINE

## 2023-05-09 RX ORDER — MINOXIDIL 2.5 MG/1
1.25 TABLET ORAL DAILY
COMMUNITY
Start: 2023-04-14 | End: 2024-01-12 | Stop reason: SDUPTHER

## 2023-05-09 RX ORDER — ROSUVASTATIN CALCIUM 40 MG/1
40 TABLET, COATED ORAL NIGHTLY
Qty: 90 TABLET | Refills: 2 | Status: SHIPPED | OUTPATIENT
Start: 2023-05-09 | End: 2023-05-29 | Stop reason: SDUPTHER

## 2023-05-09 RX ORDER — EZETIMIBE 10 MG/1
10 TABLET ORAL DAILY
Qty: 90 TABLET | Refills: 3 | Status: SHIPPED | OUTPATIENT
Start: 2023-05-09 | End: 2023-05-29 | Stop reason: SDUPTHER

## 2023-05-09 RX ORDER — LEVOTHYROXINE, LIOTHYRONINE 38; 9 UG/1; UG/1
60 TABLET ORAL
COMMUNITY
Start: 2023-04-07 | End: 2023-05-29

## 2023-05-09 RX ORDER — ROSUVASTATIN CALCIUM 20 MG/1
20 TABLET, COATED ORAL
COMMUNITY
Start: 2023-04-13 | End: 2023-05-29

## 2023-05-09 NOTE — PROGRESS NOTES
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  She has a long history of hypothyroidism in high cholesterol with evidence of elevated calcium score as seen in previous test.  She is been treated with statins and [place] thyroid.  She was previously seeing Dr. Reeder, and was started on split doses of MP thyroid with a total equaling 150 mg, but patient would often forget to take the 2nd dose, and then started taking both of them in the morning time.  She did notice increase symptoms of heart racing and sweating.  She is currently now only taking NP thyroid 60, but has not had any labs to follow-up if this is the correct dose.  She is previously taking Synthroid and Cytomel combination, but per her PCP suggestion, she was on the pig gland formulation.  She also has a long history of elevated cholesterol and cardiac disease in her family, but admits that she was not able to get the refills of Zetia.  She also saw another [facility] provider and was given metformin to treat insulin resistance, but she admits that she is not been compliant in taking it.  She mainly is interested in repeating her lipid labs and her thyroid labs and see what changes need to be made.  She does have menopausal symptoms, but does not know if this was actually thyroid symptoms and takes Effexor at bedtime to help with the night flashes.    Visit type: Telemedicine:audio and visual    Each patient to whom he or she provides medical services by telemedicine is:  (1) informed of the relationship between the physician and patient and the respective role of any other health care provider with respect to management of the patient; and (2) notified that he or she may decline to receive medical services by telemedicine and may withdraw from such care at any time.      Mable Redding is a 52 y.o. female who presents for Telemed visit.     Review of  Symptoms  General: weight gain ( unknown lbs), night sweats, and excessive sweating  Psychological ROS: negative ..  Endocrine ROS: difficulty losing weight, heat intolerance, hair loss, and hot flashes  Ophthalmic ROS: negative   ENT ROS: negative .  Cardiovascular ROS: palpitations  Respiratory ROS: negative   Gastrointestinal ROS: negative   Genito-Urinary ROS: negative   Musculoskeletal ROS: negative   Dermatological ROS: oily skin  Neurological ROS: negative      Physical Exam:  Alert and awake, Normal appearance, energetic, Fatigued appearing, and overweight/obese  Normocephalic, Atraumatic , and Normal facies  EOMI intact and Clear conjunctivae    No cyanosis and No labored breathing  Normal Abdomen, No tenderness/guarding, and Increased Abdominal Girth  Normal Musculoskeletal Exam and Normal Range of Motion  Grossly intact, No tremors observed, and No tics observed  Normal mood, Normal affect, Normal behavior, Congruent sppech, Normal memory, and Normal speech  Normal Skin and Normal Hair    ASSESSMENT AND PLAN      1. High coronary artery calcium score  Previously elevated, not optimally controlled on statin alone based on last set of labs.  We will restart Zetia, and repeat CT scoring, and maybe even consider doing Repatha or Praluent given her strong history of coronary disease in her family.  Reviewed importance of advanced lipid profiles. Advise daily exercise. Diet is recommended. Patients are encouraged to obtain healthy BMI weight. Risks associated with high cholesterol are well established and include but are not limited to heart disease, stroke and peripheral vascular disease. Patient should not smoke. Goal LDL particle number is <1000 and ApoB <70. Basic LDL is below 100 or below 70 if diabetic. Some non-FDA approved dietary supplements that may be beneficial to patient include but is not limited to high fiber diet at least 30g daily; niacin ER non flush free variety 500mg-1,000mg; Omega-3 fish  "oil DHA+EPA = 1,000mg twice daily; baby dose aspirin 81mg; co-enzyme q10 500mg to help reduce statin-induced myalgia; red rice yeast 1200mg twice daily; berberine 1000mg-2000mg daily. Patient is encouraged to exercise routinely and adhere to heart healthy diet with Mediterranean diet showing the most consistent data to help with lipid management.    -     CT Cardiac Scoring; Future; Expected date: 05/09/2023    2. Family history of premature CAD      3. Hypertension, unspecified type  Patient understands pathophysiology of high blood pressure. Patient should take meds as directed. Patient is asked to monitor blood pressure at home at random and send in BP diary if changes are made to treatment plan so adjustments can be made, if necessary, within 1-2 weeks of stopping, starting or changing medications. Patient will notify us if any symptoms occur including but not limited to dizziness, faint feeling, headache, blurred vision or chest pain or go to nearest ER if necessary for evaluation.  Currently taking losartan hydrochlorothiazide and beta-blocker,    4. Menopausal symptoms  Increased symptoms, we will look at hormone panel to see if these are symptoms of overactive thyroid or menopause   5. Elevated lipoprotein A level             I spent a total of 35 minutes face to face and non-face to face on the date of this visit.This includes time preparing to see the patient (eg, review of tests, notes), obtaining and/or reviewing additional history from an independent historian and/or outside medical records, documenting clinical information in the electronic health record, independently interpreting results and/or communicating results to the patient/family/caregiver, or care coordinator.    Disclaimer: Portions of this record may have been created with voice recognition software. Occasional wrong-word or "sound-a-like" substitutions may have occurred due to inherent limitations of voice recognition software. Read the " "chart carefully and recognize, using context, where substitutions have occurred."    Signed by:  Pretty Lewis MD        "

## 2023-05-10 ENCOUNTER — PATIENT MESSAGE (OUTPATIENT)
Dept: ADMINISTRATIVE | Facility: HOSPITAL | Age: 53
End: 2023-05-10
Payer: COMMERCIAL

## 2023-05-19 LAB
ALBUMIN SERPL-MCNC: 4.9 G/DL (ref 3.8–4.8)
ALBUMIN/GLOB SERPL: 2.7 {RATIO} (ref 1.2–2.2)
ALP SERPL-CCNC: 98 IU/L (ref 44–121)
ALT SERPL-CCNC: 33 IU/L (ref 0–32)
APO B SERPL-MCNC: 74 MG/DL
AST SERPL-CCNC: 31 IU/L (ref 0–40)
BILIRUB SERPL-MCNC: 0.4 MG/DL (ref 0–1.2)
BUN SERPL-MCNC: 17 MG/DL (ref 6–24)
BUN/CREAT SERPL: 21 (ref 9–23)
CALCIUM SERPL-MCNC: 9.8 MG/DL (ref 8.7–10.2)
CHLORIDE SERPL-SCNC: 103 MMOL/L (ref 96–106)
CO2 SERPL-SCNC: 26 MMOL/L (ref 20–29)
CREAT SERPL-MCNC: 0.81 MG/DL (ref 0.57–1)
ERYTHROCYTE [DISTWIDTH] IN BLOOD BY AUTOMATED COUNT: 13 % (ref 11.7–15.4)
EST. GFR  (NO RACE VARIABLE): 88 ML/MIN/1.73
ESTRADIOL SERPL-MCNC: 10.9 PG/ML
ESTRONE SERPL-MCNC: 14 PG/ML
FSH SERPL-ACNC: 42.1 MIU/ML
GLOBULIN SER CALC-MCNC: 1.8 G/DL (ref 1.5–4.5)
GLUCOSE SERPL-MCNC: 93 MG/DL (ref 70–99)
HBA1C MFR BLD: 5.7 % (ref 4.8–5.6)
HCT VFR BLD AUTO: 43.5 % (ref 34–46.6)
HDL SERPL-SCNC: 50.2 UMOL/L
HGB BLD-MCNC: 14.6 G/DL (ref 11.1–15.9)
INSULIN SERPL-ACNC: 19.3 UIU/ML (ref 2.6–24.9)
LDL SERPL QN: 21.4 NM
LDL SERPL-SCNC: 1150 NMOL/L
LDL SMALL SERPL-SCNC: 336 NMOL/L
LH SERPL-ACNC: 27.3 MIU/ML
LP-IR SCORE SERPL: 45
LPA SERPL-SCNC: 338.1 NMOL/L
MCH RBC QN AUTO: 29.3 PG (ref 26.6–33)
MCHC RBC AUTO-ENTMCNC: 33.6 G/DL (ref 31.5–35.7)
MCV RBC AUTO: 87 FL (ref 79–97)
PLATELET # BLD AUTO: 267 X10E3/UL (ref 150–450)
POTASSIUM SERPL-SCNC: 4.7 MMOL/L (ref 3.5–5.2)
PROGEST SERPL-MCNC: 0.3 NG/ML
PROT SERPL-MCNC: 6.7 G/DL (ref 6–8.5)
RBC # BLD AUTO: 4.98 X10E6/UL (ref 3.77–5.28)
SODIUM SERPL-SCNC: 141 MMOL/L (ref 134–144)
T3 SERPL-MCNC: 155 NG/DL (ref 71–180)
T4 FREE SERPL-MCNC: 0.63 NG/DL (ref 0.82–1.77)
TESTOST SERPL-MCNC: 7 NG/DL (ref 4–50)
THYROGLOB AB SERPL-ACNC: <1 IU/ML (ref 0–0.9)
THYROPEROXIDASE AB SERPL-ACNC: <9 IU/ML (ref 0–34)
TSH SERPL DL<=0.005 MIU/L-ACNC: 6.54 UIU/ML (ref 0.45–4.5)
WBC # BLD AUTO: 7.6 X10E3/UL (ref 3.4–10.8)

## 2023-05-29 ENCOUNTER — OFFICE VISIT (OUTPATIENT)
Dept: PRIMARY CARE CLINIC | Facility: CLINIC | Age: 53
End: 2023-05-29
Payer: COMMERCIAL

## 2023-05-29 VITALS
SYSTOLIC BLOOD PRESSURE: 138 MMHG | OXYGEN SATURATION: 97 % | DIASTOLIC BLOOD PRESSURE: 86 MMHG | BODY MASS INDEX: 33.87 KG/M2 | HEART RATE: 70 BPM | HEIGHT: 66 IN | WEIGHT: 210.75 LBS

## 2023-05-29 DIAGNOSIS — F45.8 BRUXISM: ICD-10-CM

## 2023-05-29 DIAGNOSIS — R93.1 HIGH CORONARY ARTERY CALCIUM SCORE: ICD-10-CM

## 2023-05-29 DIAGNOSIS — R73.03 PRE-DIABETES: ICD-10-CM

## 2023-05-29 DIAGNOSIS — E03.9 HYPOTHYROIDISM, UNSPECIFIED TYPE: Primary | ICD-10-CM

## 2023-05-29 DIAGNOSIS — N95.1 MENOPAUSAL SYMPTOMS: ICD-10-CM

## 2023-05-29 DIAGNOSIS — Z12.11 COLON CANCER SCREENING: ICD-10-CM

## 2023-05-29 DIAGNOSIS — I10 HYPERTENSION, UNSPECIFIED TYPE: ICD-10-CM

## 2023-05-29 PROCEDURE — 99999 PR PBB SHADOW E&M-EST. PATIENT-LVL III: CPT | Mod: PBBFAC,,, | Performed by: FAMILY MEDICINE

## 2023-05-29 PROCEDURE — 99214 OFFICE O/P EST MOD 30 MIN: CPT | Mod: S$GLB,,, | Performed by: FAMILY MEDICINE

## 2023-05-29 PROCEDURE — 99214 PR OFFICE/OUTPT VISIT, EST, LEVL IV, 30-39 MIN: ICD-10-PCS | Mod: S$GLB,,, | Performed by: FAMILY MEDICINE

## 2023-05-29 PROCEDURE — 99999 PR PBB SHADOW E&M-EST. PATIENT-LVL III: ICD-10-PCS | Mod: PBBFAC,,, | Performed by: FAMILY MEDICINE

## 2023-05-29 RX ORDER — ROSUVASTATIN CALCIUM 40 MG/1
40 TABLET, COATED ORAL NIGHTLY
Qty: 90 TABLET | Refills: 2 | Status: SHIPPED | OUTPATIENT
Start: 2023-05-29 | End: 2023-06-19 | Stop reason: SDUPTHER

## 2023-05-29 RX ORDER — LIOTHYRONINE SODIUM 5 UG/1
5 TABLET ORAL 2 TIMES DAILY
Qty: 180 TABLET | Refills: 1 | Status: SHIPPED | OUTPATIENT
Start: 2023-05-29 | End: 2023-06-05 | Stop reason: SDUPTHER

## 2023-05-29 RX ORDER — EZETIMIBE 10 MG/1
10 TABLET ORAL DAILY
Qty: 90 TABLET | Refills: 3 | Status: SHIPPED | OUTPATIENT
Start: 2023-05-29 | End: 2023-06-05 | Stop reason: SDUPTHER

## 2023-05-29 RX ORDER — CLONAZEPAM 1 MG/1
1 TABLET ORAL NIGHTLY
Qty: 90 TABLET | Refills: 1 | Status: SHIPPED | OUTPATIENT
Start: 2023-05-29 | End: 2023-06-05 | Stop reason: SDUPTHER

## 2023-05-29 RX ORDER — LEVOTHYROXINE SODIUM 100 UG/1
100 TABLET ORAL
Qty: 90 TABLET | Refills: 1 | Status: SHIPPED | OUTPATIENT
Start: 2023-05-29 | End: 2023-06-05 | Stop reason: SDUPTHER

## 2023-05-29 NOTE — PROGRESS NOTES
"    [facility]       [address] [place] [address]      [phone]     Pretty Lewis MD         .      Office Visit  05/29/2023  [id number]      SUBJECTIVE     HPI:  Mable Redding is a 52 y.o. female presents today in clinic for "No chief complaint on file.  ."   Patient is here for new patient lab follow-up.  Wants to discuss labs, strongly feels like her thyroid is not optimal.  She would also like to get back on Klonopin for asleep, as this has been greatly effective in helping improve her sleep.  The patient location is: [place]  The chief complaint leading to consultation is:  To discuss recent new patient lab    Visit type: Telemedicine:audio and visual    Each patient to whom he or she provides medical services by telemedicine is:  (1) informed of the relationship between the physician and patient and the respective role of any other health care provider with respect to management of the patient; and (2) notified that he or she may decline to receive medical services by telemedicine and may withdraw from such care at any time.      Mable Redding is a 52 y.o. female who presents for Telemed visit.     Review of Symptoms  General: weight gain ( 15 lbs)  Psychological ROS: difficulty falling/staying asleep  Endocrine ROS: fatigue, difficulty losing weight, heat intolerance, and hot flashes  Ophthalmic ROS: negative   ENT ROS: negative .  Cardiovascular ROS: palpitations  Respiratory ROS: negative   Gastrointestinal ROS: increased appetite and cravings  Genito-Urinary ROS: negative   Musculoskeletal ROS: negative   Dermatological ROS: negative  Neurological ROS: negative      Physical Exam:  Alert and awake, Normal appearance, Fatigued appearing, and overweight/obese  Normocephalic, Atraumatic , and Normal facies  EOMI intact and Clear conjunctivae    No cyanosis and No labored breathing  Normal Abdomen, No " tenderness/guarding, and Increased Abdominal Girth  Normal Musculoskeletal Exam and Normal Range of Motion  Grossly intact, No tremors observed, and No tics observed  Normal mood, Normal affect, Normal behavior, Congruent sppech, Normal memory, and Normal speech  Normal Skin and Normal Hair    ASSESSMENT AND PLAN      1. Hypothyroidism, unspecified type  Patient advised to comply with thyroid medications as directed. Patient should take thyroid meds on empty stomach and avoid taking with iron, calcium, zinc and fiber.  Potential risks associated with suppressing TSH (increased arrhythmia and bone loss) can occur as a result of thyroid replacement therapy. Emphasis is  on improving patient symptoms. Patient should notify us if any symptoms occur suggestive of overactive thyroid (fast heart rate, anxiety, sleeplessness, and tremors). Routine follow up recommended patient was taking 150 NP thyroid and felt horrible, has been taking 60 for the last month.  Is having less heart palpitations and sweating, but feels very fatigued.  We will switch back to combination Synthroid and Cytomel as patient thinks she felt much better when she was on this regimen.  We will plan to repeat labs in 4-6 weeks.  -     levothyroxine (UNITHROID) 100 MCG tablet; Take 1 tablet (100 mcg total) by mouth before breakfast.  Dispense: 90 tablet; Refill: 1  -     liothyronine (CYTOMEL) 5 MCG Tab; Take 1 tablet (5 mcg total) by mouth 2 (two) times a day.  Dispense: 180 tablet; Refill: 1  -     TSH; Future; Expected date: 05/29/2023  -     T4, Free; Future; Expected date: 05/29/2023  -     T3, Free; Future; Expected date: 05/29/2023  -     Misc Sendout Test, Blood Reverse T3 ; Future; Expected date: 05/29/2023    2. High coronary artery calcium score  Elevated LPa, we will increase to the maximum dose of Crestor as she was taking and restart Zetia.  Insert lipids  -     rosuvastatin (CRESTOR) 40 MG Tab; Take 1 tablet (40 mg total) by mouth  every evening.  Dispense: 90 tablet; Refill: 2  -     ezetimibe (ZETIA) 10 mg tablet; Take 1 tablet (10 mg total) by mouth once daily.  Dispense: 90 tablet; Refill: 3    3. Bruxism  Likely stress related, does help improve her sleep, we will restart Klonopin as she has done well on this previously  -     clonazePAM (KLONOPIN) 1 MG tablet; Take 1 tablet (1 mg total) by mouth every evening.  Dispense: 90 tablet; Refill: 1    4. Colon cancer screening  Requesting Cologuard  -     Cologuard Screening (Multitarget Stool DNA); Future; Expected date: 05/29/2023  5. Prediabetes  Pathophysiology of insulin resistance discussed with patient and therapeutic options were explained.  Dietary and lifestyle changes are recommended for the treatment of insulin resistance.   Low carbohydrate diet and/or possible intermittent fasting is recommended for insulin resistance and/or prediabetes.  Non FDA approved treatment options include but is not limited to GLP's, SGLT-2's, biguanides, and other glucose support supplements. Patient will notify us with any concerns or complaints regarding treatment plan.Weight loss is strongly encouraged and is emphasized to help reduce risk of diabetes.  Previously on metformin, we will restart as this helps with both her constipation and insulin resistance    6. Menopause  Labs consistent with menopause, we will see how straightening out the thyroid would help with her hot flash symptoms.  Not on any hormone replacement therapy         I spent a total of 35 minutes face to face and non-face to face on the date of this visit.This includes time preparing to see the patient (eg, review of tests, notes), obtaining and/or reviewing additional history from an independent historian and/or outside medical records, documenting clinical information in the electronic health record, independently interpreting results and/or communicating results to the patient/family/caregiver, or care coordinator.    Disclaimer:  "Portions of this record may have been created with voice recognition software. Occasional wrong-word or "sound-a-like" substitutions may have occurred due to inherent limitations of voice recognition software. Read the chart carefully and recognize, using context, where substitutions have occurred."    Signed by:  Pretty Lewis MD               "

## 2023-06-05 DIAGNOSIS — E03.9 HYPOTHYROIDISM, UNSPECIFIED TYPE: ICD-10-CM

## 2023-06-05 DIAGNOSIS — R93.1 HIGH CORONARY ARTERY CALCIUM SCORE: ICD-10-CM

## 2023-06-05 DIAGNOSIS — F45.8 BRUXISM: ICD-10-CM

## 2023-06-05 RX ORDER — EZETIMIBE 10 MG/1
10 TABLET ORAL DAILY
Qty: 90 TABLET | Refills: 3 | Status: SHIPPED | OUTPATIENT
Start: 2023-06-05 | End: 2023-06-19 | Stop reason: SDUPTHER

## 2023-06-05 RX ORDER — LIOTHYRONINE SODIUM 5 UG/1
5 TABLET ORAL 2 TIMES DAILY
Qty: 180 TABLET | Refills: 1 | Status: SHIPPED | OUTPATIENT
Start: 2023-06-05 | End: 2023-06-19 | Stop reason: SDUPTHER

## 2023-06-05 RX ORDER — CLONAZEPAM 1 MG/1
1 TABLET ORAL NIGHTLY
Qty: 90 TABLET | Refills: 1 | Status: SHIPPED | OUTPATIENT
Start: 2023-06-05 | End: 2023-06-19 | Stop reason: SDUPTHER

## 2023-06-05 RX ORDER — LEVOTHYROXINE SODIUM 100 UG/1
100 TABLET ORAL
Qty: 90 TABLET | Refills: 1 | Status: SHIPPED | OUTPATIENT
Start: 2023-06-05 | End: 2023-06-19 | Stop reason: SDUPTHER

## 2023-06-19 DIAGNOSIS — E03.9 HYPOTHYROIDISM, UNSPECIFIED TYPE: ICD-10-CM

## 2023-06-19 DIAGNOSIS — R30.0 DYSURIA: Primary | ICD-10-CM

## 2023-06-19 DIAGNOSIS — R00.2 PALPITATION: ICD-10-CM

## 2023-06-19 DIAGNOSIS — R93.1 HIGH CORONARY ARTERY CALCIUM SCORE: ICD-10-CM

## 2023-06-19 DIAGNOSIS — F45.8 BRUXISM: ICD-10-CM

## 2023-06-19 RX ORDER — METOPROLOL SUCCINATE 25 MG/1
25 TABLET, EXTENDED RELEASE ORAL DAILY
Qty: 90 TABLET | Refills: 1 | Status: SHIPPED | OUTPATIENT
Start: 2023-06-19 | End: 2023-12-14 | Stop reason: SDUPTHER

## 2023-06-19 RX ORDER — LEVOTHYROXINE SODIUM 100 UG/1
100 TABLET ORAL
Qty: 90 TABLET | Refills: 1 | Status: SHIPPED | OUTPATIENT
Start: 2023-06-19 | End: 2023-12-14 | Stop reason: SDUPTHER

## 2023-06-19 RX ORDER — ROSUVASTATIN CALCIUM 40 MG/1
40 TABLET, COATED ORAL NIGHTLY
Qty: 90 TABLET | Refills: 2 | Status: SHIPPED | OUTPATIENT
Start: 2023-06-19

## 2023-06-19 RX ORDER — PANTOPRAZOLE SODIUM 40 MG/1
40 TABLET, DELAYED RELEASE ORAL NIGHTLY
Qty: 90 TABLET | Refills: 2 | Status: SHIPPED | OUTPATIENT
Start: 2023-06-19 | End: 2024-03-15

## 2023-06-19 RX ORDER — CIPROFLOXACIN 250 MG/1
250 TABLET, FILM COATED ORAL EVERY 12 HOURS
Qty: 6 TABLET | Refills: 0 | Status: SHIPPED | OUTPATIENT
Start: 2023-06-19 | End: 2023-06-22

## 2023-06-19 RX ORDER — LOSARTAN POTASSIUM 100 MG/1
100 TABLET ORAL NIGHTLY
Qty: 90 TABLET | Refills: 2 | Status: SHIPPED | OUTPATIENT
Start: 2023-06-19

## 2023-06-19 RX ORDER — EZETIMIBE 10 MG/1
10 TABLET ORAL DAILY
Qty: 90 TABLET | Refills: 3 | Status: SHIPPED | OUTPATIENT
Start: 2023-06-19 | End: 2024-06-18

## 2023-06-19 RX ORDER — LIOTHYRONINE SODIUM 5 UG/1
5 TABLET ORAL 2 TIMES DAILY
Qty: 180 TABLET | Refills: 1 | Status: SHIPPED | OUTPATIENT
Start: 2023-06-19 | End: 2023-12-14 | Stop reason: SDUPTHER

## 2023-06-19 RX ORDER — LORATADINE 10 MG/1
10 TABLET ORAL DAILY PRN
Qty: 90 TABLET | Refills: 2 | Status: SHIPPED | OUTPATIENT
Start: 2023-06-19 | End: 2024-03-15

## 2023-06-19 RX ORDER — CLONAZEPAM 1 MG/1
1 TABLET ORAL NIGHTLY
Qty: 90 TABLET | Refills: 1 | Status: SHIPPED | OUTPATIENT
Start: 2023-06-19 | End: 2023-12-19

## 2023-06-22 ENCOUNTER — LAB VISIT (OUTPATIENT)
Dept: LAB | Facility: HOSPITAL | Age: 53
End: 2023-06-22
Payer: COMMERCIAL

## 2023-06-22 DIAGNOSIS — E61.1 IRON DEFICIENCY: ICD-10-CM

## 2023-06-22 DIAGNOSIS — R53.83 FATIGUE, UNSPECIFIED TYPE: Primary | ICD-10-CM

## 2023-06-22 DIAGNOSIS — E63.9 NUTRITIONAL DEFICIENCY: ICD-10-CM

## 2023-06-22 DIAGNOSIS — R53.83 FATIGUE, UNSPECIFIED TYPE: ICD-10-CM

## 2023-06-22 LAB
25(OH)D3+25(OH)D2 SERPL-MCNC: 50 NG/ML (ref 30–96)
ALBUMIN SERPL BCP-MCNC: 4.2 G/DL (ref 3.5–5.2)
ALP SERPL-CCNC: 94 U/L (ref 55–135)
ALT SERPL W/O P-5'-P-CCNC: 31 U/L (ref 10–44)
ANION GAP SERPL CALC-SCNC: 10 MMOL/L (ref 8–16)
AST SERPL-CCNC: 23 U/L (ref 10–40)
BILIRUB SERPL-MCNC: 0.4 MG/DL (ref 0.1–1)
BUN SERPL-MCNC: 17 MG/DL (ref 6–20)
CALCIUM SERPL-MCNC: 9.8 MG/DL (ref 8.7–10.5)
CHLORIDE SERPL-SCNC: 103 MMOL/L (ref 95–110)
CO2 SERPL-SCNC: 27 MMOL/L (ref 23–29)
CREAT SERPL-MCNC: 0.8 MG/DL (ref 0.5–1.4)
EST. GFR  (NO RACE VARIABLE): >60 ML/MIN/1.73 M^2
FERRITIN SERPL-MCNC: 219 NG/ML (ref 20–300)
FOLATE SERPL-MCNC: 6.9 NG/ML (ref 4–24)
GLUCOSE SERPL-MCNC: 87 MG/DL (ref 70–110)
IRON SERPL-MCNC: 76 UG/DL (ref 30–160)
POTASSIUM SERPL-SCNC: 3.8 MMOL/L (ref 3.5–5.1)
PROT SERPL-MCNC: 6.9 G/DL (ref 6–8.4)
SATURATED IRON: 20 % (ref 20–50)
SODIUM SERPL-SCNC: 140 MMOL/L (ref 136–145)
T4 FREE SERPL-MCNC: 0.96 NG/DL (ref 0.71–1.51)
TOTAL IRON BINDING CAPACITY: 388 UG/DL (ref 250–450)
TRANSFERRIN SERPL-MCNC: 262 MG/DL (ref 200–375)
TSH SERPL DL<=0.005 MIU/L-ACNC: 0.12 UIU/ML (ref 0.4–4)
VIT B12 SERPL-MCNC: 866 PG/ML (ref 210–950)

## 2023-06-22 PROCEDURE — 84630 ASSAY OF ZINC: CPT

## 2023-06-22 PROCEDURE — 82525 ASSAY OF COPPER: CPT

## 2023-06-22 PROCEDURE — 80053 COMPREHEN METABOLIC PANEL: CPT

## 2023-06-22 PROCEDURE — 82306 VITAMIN D 25 HYDROXY: CPT

## 2023-06-22 PROCEDURE — 82607 VITAMIN B-12: CPT

## 2023-06-22 PROCEDURE — 84466 ASSAY OF TRANSFERRIN: CPT

## 2023-06-22 PROCEDURE — 82746 ASSAY OF FOLIC ACID SERUM: CPT

## 2023-06-22 PROCEDURE — 84443 ASSAY THYROID STIM HORMONE: CPT

## 2023-06-22 PROCEDURE — 82728 ASSAY OF FERRITIN: CPT

## 2023-06-22 PROCEDURE — 84207 ASSAY OF VITAMIN B-6: CPT

## 2023-06-22 PROCEDURE — 84425 ASSAY OF VITAMIN B-1: CPT

## 2023-06-22 PROCEDURE — 84439 ASSAY OF FREE THYROXINE: CPT

## 2023-06-26 DIAGNOSIS — B37.31 VAGINAL CANDIDIASIS: Primary | ICD-10-CM

## 2023-06-26 LAB — ZINC SERPL-MCNC: 69 UG/DL (ref 60–130)

## 2023-06-26 RX ORDER — FLUCONAZOLE 150 MG/1
150 TABLET ORAL DAILY
Qty: 3 TABLET | Refills: 1 | Status: SHIPPED | OUTPATIENT
Start: 2023-06-26 | End: 2023-08-25 | Stop reason: SDUPTHER

## 2023-06-27 LAB
COPPER SERPL-MCNC: 907 UG/L (ref 810–1990)
PYRIDOXAL SERPL-MCNC: 77 UG/L (ref 5–50)
VIT B1 BLD-MCNC: 120 UG/L (ref 38–122)

## 2023-07-17 DIAGNOSIS — B37.31 VAGINAL CANDIDIASIS: Primary | ICD-10-CM

## 2023-07-17 RX ORDER — FLUCONAZOLE 150 MG/1
150 TABLET ORAL ONCE
Qty: 1 TABLET | Refills: 0 | Status: SHIPPED | OUTPATIENT
Start: 2023-07-17 | End: 2023-07-17

## 2023-07-17 RX ORDER — METHYLPREDNISOLONE 4 MG/1
TABLET ORAL
Qty: 21 EACH | Refills: 0 | Status: SHIPPED | OUTPATIENT
Start: 2023-07-17 | End: 2023-08-07

## 2023-08-24 DIAGNOSIS — N95.1 HOT FLASHES DUE TO MENOPAUSE: ICD-10-CM

## 2023-08-24 DIAGNOSIS — G47.9 DISTURBANCE OF SLEEP: ICD-10-CM

## 2023-08-25 DIAGNOSIS — L73.9 FOLLICULITIS: Primary | ICD-10-CM

## 2023-08-25 DIAGNOSIS — B37.31 VAGINAL CANDIDIASIS: ICD-10-CM

## 2023-08-25 RX ORDER — FLUCONAZOLE 150 MG/1
150 TABLET ORAL DAILY
Qty: 3 TABLET | Refills: 3 | Status: SHIPPED | OUTPATIENT
Start: 2023-08-25 | End: 2023-12-01 | Stop reason: SDUPTHER

## 2023-08-25 RX ORDER — DOXYCYCLINE 100 MG/1
100 CAPSULE ORAL 2 TIMES DAILY
Qty: 14 CAPSULE | Refills: 1 | Status: SHIPPED | OUTPATIENT
Start: 2023-08-25 | End: 2024-02-02

## 2023-08-26 RX ORDER — VENLAFAXINE HYDROCHLORIDE 150 MG/1
150 CAPSULE, EXTENDED RELEASE ORAL DAILY
Qty: 90 CAPSULE | Refills: 3 | Status: SHIPPED | OUTPATIENT
Start: 2023-08-26 | End: 2023-12-01 | Stop reason: SDUPTHER

## 2023-11-03 DIAGNOSIS — J06.9 UPPER RESPIRATORY TRACT INFECTION, UNSPECIFIED TYPE: Primary | ICD-10-CM

## 2023-11-10 DIAGNOSIS — E61.1 IRON DEFICIENCY: Primary | ICD-10-CM

## 2023-11-20 ENCOUNTER — LAB VISIT (OUTPATIENT)
Dept: LAB | Facility: HOSPITAL | Age: 53
End: 2023-11-20
Payer: COMMERCIAL

## 2023-11-20 DIAGNOSIS — E61.1 IRON DEFICIENCY: ICD-10-CM

## 2023-11-20 LAB
BASOPHILS # BLD AUTO: 0.02 K/UL (ref 0–0.2)
BASOPHILS NFR BLD: 0.3 % (ref 0–1.9)
DIFFERENTIAL METHOD: NORMAL
EOSINOPHIL # BLD AUTO: 0.2 K/UL (ref 0–0.5)
EOSINOPHIL NFR BLD: 3 % (ref 0–8)
ERYTHROCYTE [DISTWIDTH] IN BLOOD BY AUTOMATED COUNT: 13.1 % (ref 11.5–14.5)
FERRITIN SERPL-MCNC: 301 NG/ML (ref 20–300)
HCT VFR BLD AUTO: 42.7 % (ref 37–48.5)
HGB BLD-MCNC: 14 G/DL (ref 12–16)
IMM GRANULOCYTES # BLD AUTO: 0.02 K/UL (ref 0–0.04)
IMM GRANULOCYTES NFR BLD AUTO: 0.3 % (ref 0–0.5)
IRON SERPL-MCNC: 84 UG/DL (ref 30–160)
LYMPHOCYTES # BLD AUTO: 2.3 K/UL (ref 1–4.8)
LYMPHOCYTES NFR BLD: 34.2 % (ref 18–48)
MCH RBC QN AUTO: 28.1 PG (ref 27–31)
MCHC RBC AUTO-ENTMCNC: 32.8 G/DL (ref 32–36)
MCV RBC AUTO: 86 FL (ref 82–98)
MONOCYTES # BLD AUTO: 0.5 K/UL (ref 0.3–1)
MONOCYTES NFR BLD: 6.9 % (ref 4–15)
NEUTROPHILS # BLD AUTO: 3.7 K/UL (ref 1.8–7.7)
NEUTROPHILS NFR BLD: 55.3 % (ref 38–73)
NRBC BLD-RTO: 0 /100 WBC
PLATELET # BLD AUTO: 286 K/UL (ref 150–450)
PMV BLD AUTO: 9.6 FL (ref 9.2–12.9)
RBC # BLD AUTO: 4.99 M/UL (ref 4–5.4)
SATURATED IRON: 21 % (ref 20–50)
TOTAL IRON BINDING CAPACITY: 400 UG/DL (ref 250–450)
TRANSFERRIN SERPL-MCNC: 270 MG/DL (ref 200–375)
WBC # BLD AUTO: 6.7 K/UL (ref 3.9–12.7)

## 2023-11-20 PROCEDURE — 84466 ASSAY OF TRANSFERRIN: CPT

## 2023-11-20 PROCEDURE — 82728 ASSAY OF FERRITIN: CPT

## 2023-11-20 PROCEDURE — 36415 COLL VENOUS BLD VENIPUNCTURE: CPT | Mod: PN

## 2023-11-20 PROCEDURE — 85025 COMPLETE CBC W/AUTO DIFF WBC: CPT

## 2023-11-20 PROCEDURE — 83540 ASSAY OF IRON: CPT

## 2023-11-27 ENCOUNTER — CLINICAL SUPPORT (OUTPATIENT)
Dept: PRIMARY CARE CLINIC | Facility: CLINIC | Age: 53
End: 2023-11-27
Payer: COMMERCIAL

## 2023-11-27 DIAGNOSIS — Z23 IMMUNIZATION DUE: Primary | ICD-10-CM

## 2023-11-27 PROCEDURE — 90715 TDAP VACCINE 7 YRS/> IM: CPT | Mod: S$GLB,,, | Performed by: FAMILY MEDICINE

## 2023-11-27 PROCEDURE — 99999 PR PBB SHADOW E&M-EST. PATIENT-LVL III: CPT | Mod: PBBFAC,,,

## 2023-11-27 PROCEDURE — 99999 PR PBB SHADOW E&M-EST. PATIENT-LVL III: ICD-10-PCS | Mod: PBBFAC,,,

## 2023-11-27 PROCEDURE — 90715 TDAP VACCINE GREATER THAN OR EQUAL TO 7YO IM: ICD-10-PCS | Mod: S$GLB,,, | Performed by: FAMILY MEDICINE

## 2023-11-27 PROCEDURE — 90471 IMMUNIZATION ADMIN: CPT | Mod: S$GLB,,, | Performed by: FAMILY MEDICINE

## 2023-11-27 PROCEDURE — 90471 TDAP VACCINE GREATER THAN OR EQUAL TO 7YO IM: ICD-10-PCS | Mod: S$GLB,,, | Performed by: FAMILY MEDICINE

## 2023-12-01 DIAGNOSIS — B37.31 VAGINAL CANDIDIASIS: ICD-10-CM

## 2023-12-01 DIAGNOSIS — G47.9 DISTURBANCE OF SLEEP: ICD-10-CM

## 2023-12-01 DIAGNOSIS — N95.1 HOT FLASHES DUE TO MENOPAUSE: ICD-10-CM

## 2023-12-01 RX ORDER — FLUCONAZOLE 150 MG/1
150 TABLET ORAL DAILY
Qty: 3 TABLET | Refills: 3 | Status: SHIPPED | OUTPATIENT
Start: 2023-12-01 | End: 2024-01-12 | Stop reason: SDUPTHER

## 2023-12-01 RX ORDER — VENLAFAXINE HYDROCHLORIDE 150 MG/1
150 CAPSULE, EXTENDED RELEASE ORAL DAILY
Qty: 90 CAPSULE | Refills: 3 | Status: SHIPPED | OUTPATIENT
Start: 2023-12-01 | End: 2023-12-14

## 2023-12-11 ENCOUNTER — LAB VISIT (OUTPATIENT)
Dept: LAB | Facility: HOSPITAL | Age: 53
End: 2023-12-11
Attending: FAMILY MEDICINE
Payer: COMMERCIAL

## 2023-12-11 DIAGNOSIS — E03.9 HYPOTHYROIDISM, UNSPECIFIED TYPE: ICD-10-CM

## 2023-12-11 DIAGNOSIS — E03.9 HYPOTHYROIDISM, UNSPECIFIED TYPE: Primary | ICD-10-CM

## 2023-12-11 DIAGNOSIS — R63.2 BINGE EATING: ICD-10-CM

## 2023-12-11 DIAGNOSIS — G47.19 EXCESSIVE DAYTIME SLEEPINESS: ICD-10-CM

## 2023-12-11 PROCEDURE — 84481 FREE ASSAY (FT-3): CPT | Performed by: FAMILY MEDICINE

## 2023-12-11 PROCEDURE — 36415 COLL VENOUS BLD VENIPUNCTURE: CPT | Mod: PN | Performed by: FAMILY MEDICINE

## 2023-12-11 PROCEDURE — 86376 MICROSOMAL ANTIBODY EACH: CPT | Performed by: FAMILY MEDICINE

## 2023-12-11 PROCEDURE — 84443 ASSAY THYROID STIM HORMONE: CPT | Performed by: FAMILY MEDICINE

## 2023-12-11 PROCEDURE — 84439 ASSAY OF FREE THYROXINE: CPT | Performed by: FAMILY MEDICINE

## 2023-12-11 RX ORDER — LISDEXAMFETAMINE DIMESYLATE CAPSULES 20 MG/1
20 CAPSULE ORAL EVERY MORNING
Qty: 30 CAPSULE | Refills: 0 | Status: SHIPPED | OUTPATIENT
Start: 2023-12-11 | End: 2023-12-11 | Stop reason: SDUPTHER

## 2023-12-11 RX ORDER — LISDEXAMFETAMINE DIMESYLATE CAPSULES 20 MG/1
20 CAPSULE ORAL EVERY MORNING
Qty: 30 CAPSULE | Refills: 0 | Status: SHIPPED | OUTPATIENT
Start: 2023-12-11 | End: 2024-02-08

## 2023-12-12 ENCOUNTER — TELEPHONE (OUTPATIENT)
Dept: SLEEP MEDICINE | Facility: CLINIC | Age: 53
End: 2023-12-12
Payer: COMMERCIAL

## 2023-12-12 LAB
T3FREE SERPL-MCNC: 3 PG/ML (ref 2.3–4.2)
T4 FREE SERPL-MCNC: 0.87 NG/DL (ref 0.71–1.51)
THYROPEROXIDASE IGG SERPL-ACNC: <6 IU/ML
TSH SERPL DL<=0.005 MIU/L-ACNC: 0.12 UIU/ML (ref 0.4–4)

## 2023-12-12 NOTE — TELEPHONE ENCOUNTER
----- Message from Anthony Fernandes sent at 12/12/2023  8:10 AM CST -----  [facility], [facility]

## 2023-12-13 DIAGNOSIS — F45.8 BRUXISM: ICD-10-CM

## 2023-12-13 DIAGNOSIS — R00.2 PALPITATION: ICD-10-CM

## 2023-12-13 DIAGNOSIS — G47.9 DISTURBANCE OF SLEEP: ICD-10-CM

## 2023-12-13 DIAGNOSIS — N95.1 HOT FLASHES DUE TO MENOPAUSE: ICD-10-CM

## 2023-12-13 DIAGNOSIS — E03.9 HYPOTHYROIDISM, UNSPECIFIED TYPE: ICD-10-CM

## 2023-12-14 RX ORDER — VENLAFAXINE HYDROCHLORIDE 150 MG/1
150 CAPSULE, EXTENDED RELEASE ORAL DAILY
Qty: 90 CAPSULE | Refills: 3 | Status: SHIPPED | OUTPATIENT
Start: 2023-12-14 | End: 2024-12-13

## 2023-12-14 RX ORDER — METOPROLOL SUCCINATE 25 MG/1
25 TABLET, EXTENDED RELEASE ORAL
Qty: 90 TABLET | Refills: 1 | Status: SHIPPED | OUTPATIENT
Start: 2023-12-14

## 2023-12-14 RX ORDER — LIOTHYRONINE SODIUM 5 UG/1
10 TABLET ORAL DAILY
Qty: 180 TABLET | Refills: 1 | Status: SHIPPED | OUTPATIENT
Start: 2023-12-14

## 2023-12-14 RX ORDER — LEVOTHYROXINE SODIUM 100 UG/1
100 TABLET ORAL
Qty: 90 TABLET | Refills: 1 | Status: SHIPPED | OUTPATIENT
Start: 2023-12-14

## 2023-12-19 RX ORDER — CLONAZEPAM 1 MG/1
1 TABLET ORAL NIGHTLY
Qty: 90 TABLET | Refills: 1 | Status: SHIPPED | OUTPATIENT
Start: 2023-12-19 | End: 2024-03-22 | Stop reason: SDUPTHER

## 2024-01-12 DIAGNOSIS — L65.9 HAIR LOSS: ICD-10-CM

## 2024-01-12 DIAGNOSIS — B37.31 VAGINAL CANDIDIASIS: ICD-10-CM

## 2024-01-12 DIAGNOSIS — N39.0 URINARY TRACT INFECTION WITHOUT HEMATURIA, SITE UNSPECIFIED: Primary | ICD-10-CM

## 2024-01-12 RX ORDER — NITROFURANTOIN 25; 75 MG/1; MG/1
100 CAPSULE ORAL 2 TIMES DAILY
Qty: 14 CAPSULE | Refills: 1 | Status: SHIPPED | OUTPATIENT
Start: 2024-01-12

## 2024-01-12 RX ORDER — FLUCONAZOLE 150 MG/1
150 TABLET ORAL DAILY
Qty: 3 TABLET | Refills: 3 | Status: SHIPPED | OUTPATIENT
Start: 2024-01-12

## 2024-01-12 RX ORDER — MINOXIDIL 2.5 MG/1
1.25 TABLET ORAL DAILY
Qty: 30 TABLET | Refills: 1 | Status: SHIPPED | OUTPATIENT
Start: 2024-01-12

## 2024-02-02 DIAGNOSIS — L73.9 FOLLICULITIS: Primary | ICD-10-CM

## 2024-02-02 RX ORDER — DOXYCYCLINE 40 MG/1
40 CAPSULE ORAL DAILY
Qty: 90 CAPSULE | Refills: 0 | Status: SHIPPED | OUTPATIENT
Start: 2024-02-02 | End: 2024-03-08 | Stop reason: SDUPTHER

## 2024-02-05 ENCOUNTER — HOSPITAL ENCOUNTER (OUTPATIENT)
Dept: RADIOLOGY | Facility: HOSPITAL | Age: 54
Discharge: HOME | End: 2024-02-05
Attending: STUDENT IN AN ORGANIZED HEALTH CARE EDUCATION/TRAINING PROGRAM
Payer: COMMERCIAL

## 2024-02-05 ENCOUNTER — OFFICE VISIT (OUTPATIENT)
Dept: SPORTS MEDICINE | Facility: CLINIC | Age: 54
End: 2024-02-05
Payer: COMMERCIAL

## 2024-02-05 DIAGNOSIS — M77.11 RIGHT LATERAL EPICONDYLITIS: ICD-10-CM

## 2024-02-05 DIAGNOSIS — M25.562 CHRONIC PAIN OF LEFT KNEE: ICD-10-CM

## 2024-02-05 DIAGNOSIS — M17.12 PRIMARY OSTEOARTHRITIS OF LEFT KNEE: Primary | ICD-10-CM

## 2024-02-05 DIAGNOSIS — M25.552 CHRONIC HIP PAIN, BILATERAL: ICD-10-CM

## 2024-02-05 DIAGNOSIS — M25.521 RIGHT ELBOW PAIN: ICD-10-CM

## 2024-02-05 DIAGNOSIS — M25.551 GREATER TROCHANTERIC PAIN SYNDROME OF BOTH LOWER EXTREMITIES: ICD-10-CM

## 2024-02-05 DIAGNOSIS — G89.29 CHRONIC PAIN OF LEFT KNEE: ICD-10-CM

## 2024-02-05 DIAGNOSIS — G89.29 CHRONIC HIP PAIN, BILATERAL: ICD-10-CM

## 2024-02-05 DIAGNOSIS — M25.551 CHRONIC HIP PAIN, BILATERAL: ICD-10-CM

## 2024-02-05 DIAGNOSIS — M25.551 RIGHT HIP PAIN: Primary | ICD-10-CM

## 2024-02-05 DIAGNOSIS — M25.562 LEFT KNEE PAIN, UNSPECIFIED CHRONICITY: Primary | ICD-10-CM

## 2024-02-05 DIAGNOSIS — M25.562 LEFT KNEE PAIN, UNSPECIFIED CHRONICITY: ICD-10-CM

## 2024-02-05 DIAGNOSIS — M25.552 GREATER TROCHANTERIC PAIN SYNDROME OF BOTH LOWER EXTREMITIES: ICD-10-CM

## 2024-02-05 DIAGNOSIS — M25.551 RIGHT HIP PAIN: ICD-10-CM

## 2024-02-05 DIAGNOSIS — M67.959 TENDINOPATHY OF GLUTEAL REGION: ICD-10-CM

## 2024-02-05 PROCEDURE — 73503 X-RAY EXAM HIP UNI 4/> VIEWS: CPT | Mod: TC,PN,RT

## 2024-02-05 PROCEDURE — 73562 X-RAY EXAM OF KNEE 3: CPT | Mod: TC,PN,LT

## 2024-02-05 PROCEDURE — 20610 DRAIN/INJ JOINT/BURSA W/O US: CPT | Mod: LT,S$GLB,, | Performed by: STUDENT IN AN ORGANIZED HEALTH CARE EDUCATION/TRAINING PROGRAM

## 2024-02-05 PROCEDURE — 99214 OFFICE O/P EST MOD 30 MIN: CPT | Mod: 25,S$GLB,, | Performed by: STUDENT IN AN ORGANIZED HEALTH CARE EDUCATION/TRAINING PROGRAM

## 2024-02-05 PROCEDURE — 73080 X-RAY EXAM OF ELBOW: CPT | Mod: 26,RT,, | Performed by: RADIOLOGY

## 2024-02-05 PROCEDURE — 73562 X-RAY EXAM OF KNEE 3: CPT | Mod: 26,LT,, | Performed by: RADIOLOGY

## 2024-02-05 PROCEDURE — 73560 X-RAY EXAM OF KNEE 1 OR 2: CPT | Mod: TC,PN,RT

## 2024-02-05 PROCEDURE — 73080 X-RAY EXAM OF ELBOW: CPT | Mod: TC,PN,RT

## 2024-02-05 PROCEDURE — 99999 PR PBB SHADOW E&M-EST. PATIENT-LVL IV: CPT | Mod: PBBFAC,,, | Performed by: STUDENT IN AN ORGANIZED HEALTH CARE EDUCATION/TRAINING PROGRAM

## 2024-02-05 PROCEDURE — 73503 X-RAY EXAM HIP UNI 4/> VIEWS: CPT | Mod: 26,RT,, | Performed by: RADIOLOGY

## 2024-02-05 PROCEDURE — 73560 X-RAY EXAM OF KNEE 1 OR 2: CPT | Mod: 26,59,RT, | Performed by: RADIOLOGY

## 2024-02-05 RX ORDER — TRIAMCINOLONE ACETONIDE 40 MG/ML
40 INJECTION, SUSPENSION INTRA-ARTICULAR; INTRAMUSCULAR
Status: DISCONTINUED | OUTPATIENT
Start: 2024-02-05 | End: 2024-02-05 | Stop reason: HOSPADM

## 2024-02-05 RX ADMIN — TRIAMCINOLONE ACETONIDE 40 MG: 40 INJECTION, SUSPENSION INTRA-ARTICULAR; INTRAMUSCULAR at 01:02

## 2024-02-05 NOTE — PROCEDURES
Large Joint Aspiration/Injection: L knee    Date/Time: 2/5/2024 1:40 PM    Performed by: Daniel Srinivasan MD  Authorized by: Daniel Srinivasan MD    Consent Done?:  Yes (Verbal)  Indications:  Arthritis and pain  Site marked: the procedure site was marked    Timeout: prior to procedure the correct patient, procedure, and site was verified      Local anesthesia used?: Yes    Anesthesia:  Local infiltration  Local anesthetic:  Lidocaine 1% without epinephrine, bupivacaine 0.5% without epinephrine and topical anesthetic  Anesthetic total (ml):  4      Details:  Needle Size:  22 G  Ultrasonic Guidance for needle placement?: No    Approach:  Anterolateral  Location:  Knee  Site:  L knee  Medications:  40 mg triamcinolone acetonide 40 mg/mL  Patient tolerance:  Patient tolerated the procedure well with no immediate complications     We discussed the proper protocols after the injection such as no submerging pools, baths tubs, or hot tubs for 24 hr.  Showering is okay today.  We also discussed that blood sugars can be elevated after an injection and asked patient to properly check their sugars over the next few days and contact their PCP if there are any concerns.  We discussed red flags such as fevers, chills, red, warm, tender joint at the area of injection to please seek medical care immediately.

## 2024-02-05 NOTE — PATIENT INSTRUCTIONS
Assessment:  Mable Redding is a 53 y.o. female with a chief complaint of Pain of the Left Knee, Pain of the Right Elbow, Pain of the Right Hip, and Pain of the Left Hip    Encounter Diagnoses   Name Primary?    Primary osteoarthritis of left knee Yes    Chronic pain of left knee     Right lateral epicondylitis     Right elbow pain     Chronic hip pain, bilateral     Greater trochanteric pain syndrome of both lower extremities     Tendinopathy of gluteal region, bilateral       Plan:  XR reviewed  Left knee - mild degenerative changes  Right elbow - enthesopathy at the lateral epicondyle  Bilateral hips - calcific tendinopathy at greater trochanteric bursa  Labs reviewed - Cr 0.8, GFR > 60, LFTs WNL  History and clinical exam consistent with:  Left knee pain due to acute inflammatory flare, likely due to underlying degenerative disease, osteoarthritis, degenerative meniscus.    Right elbow pain due to acute lateral epicondylitis, likely in the setting of chronic lateral epicondylosis.    Bilateral hip pain due to greater trochanteric pain syndrome, gluteal tendinopathy.    Treatment plan:  Left knee - corticosteroid injection today, continue knee brace as needed  Proper protocols after the injection included: no submerging pools, baths tubs, or hot tubs for 24 hr.  Showering is okay today.  Side effects of the corticosteroid injection can include elevated blood glucose levels and blood pressures, so if you are taking medications for these, please monitor closely, and contact your PCP if any issues.  Red flag symptoms include fever, chills, nausea, vomiting, red, warm, tender joint at the area of injection.  If you are noticing these symptoms, they may be indicative of an infection, and please seek medical care immediately, either by calling our clinic or going to the emergency room.  Right elbow - recommend use of wrist brace at night, and a counterforce strap during the day.  If not improving, can consider  for corticosteroid injection, vs PRP injection, vs minimally invasive tenotomy with TenJet.    Bilateral hips - discussed treatment options, including therapy, vs corticosteroid injection, vs minimally invasive tenotomy with TenJet.  Patient would like to proceed with TenJet, we will 1st obtain MRIs better evaluate, then we will proceed with TenJet, if appropriate    Follow-up: Will obtain bilateral hip MRI, then likely plan for right hip TenJet or sooner if there are any problems between now and then.    Thank you for choosing [facility] and Dr. Daniel Srinivasan for your orthopedic & sports medicine care. It is our goal to provide you with exceptional care that will help keep you healthy, active, and get you back in the game.    Please do not hesitate to reach out to us via email, phone, or MyChart with any questions, concerns, or feedback.    If you are experiencing pain/discomfort ,or have questions after 5pm and would like to be connected to the [facility] on-call team, please call this number and specify which Sports Medicine provider is treating you: [phone]           Here is some general information on TenJet procedure. You may also watch the video on this link to see an overview of how the procedure goes as well.   TenJet Video  [email or web address]

## 2024-02-05 NOTE — PROGRESS NOTES
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 female who presents today with Pain of the Left Knee, Pain of the Right Elbow, Pain of the Right Hip, and Pain of the Left Hip    Left Knee:  She complains of chronic left knee pain that has been under the care of Dr. Rudy Dempsey.  She underwent surgery for medial meniscectomy in 2022 and did well initially.  She planned viscosupplementation injections in the past but never completed them.  She had been doing well until her left knee flared up during a pickleball match on 2/1/24.  She pivoted and has felt deep pain in the anterior, medial and lateral knee that has not responded to conservative management.  It worsens with weight bearing.  She is wearing a knee brace.    Right Elbow:  She began to experience right elbow pain near the end of January when she used a chainsaw for a period of time which caused pain in the lateral right elbow.  She aggravated the issue while handling a bull on 1/30/24.  No parasthesias or radicular symptoms.  No mechanical symptoms.    Bilateral Hips:  She complains of bilateral hip pain R>L that has been present for many years, worsening since summer 2023 without any new injuries.  She has had an MRI in the past of the right hip with showed a cyst in the bone but no other significant structural pathology per her recollection.  She has just been dealing with the pain for many years.  Now she has difficulty sleeping at night as the pain causes her to have to switch positions regularly.    Past Medical History:   Past Medical History:   Diagnosis Date    Anxiety     Bursitis     Right hip    COVID-19 11/3/2021    Depression     Digestive disorder      Herpes genitalia     History of blood clots     HPV (human papilloma virus) infection     HTN (hypertension) 2022    Lupus anticoagulant disorder     Lupus anticoagulant disorder 2019    Pulmonary embolus     Thyroid disease      Past Surgical History:   Procedure Laterality Date    ADENOIDECTOMY      ARTHROSCOPIC CHONDROPLASTY OF KNEE JOINT Left 2022    Procedure: ARTHROSCOPY, KNEE, WITH CHONDROPLASTY;  Surgeon: Rudy Dempsey MD;  Location: [facility] OR;  Service: Orthopedics;  Laterality: Left;  Left knee chondroplasty of the left knee medial femoral condyle and medial tibial plateau    ARTHROSCOPY OF KNEE Left 2022    Procedure: ARTHROSCOPY, KNEE;  Surgeon: Rudy Dempsey MD;  Location: [facility] OR;  Service: Orthopedics;  Laterality: Left;     SECTION      DILATION AND CURETTAGE OF UTERUS      HYSTERECTOMY  2017    HYSTEROSCOPY      KNEE ARTHROSCOPY W/ MENISCECTOMY Left 2022    Procedure: ARTHROSCOPY, KNEE, WITH MENISCECTOMY;  Surgeon: Rudy Dempsey MD;  Location: [facility] OR;  Service: Orthopedics;  Laterality: Left;  partial medial meniscectomy (approximately 60-70% left of the posterior horn) and lateral meniscectomy (small flap on anterior horn    LIPOMA RESECTION      OTHER SURGICAL HISTORY      Patient states she feel SOB in recovery and BP drops. Requests to be placed on side or sitting up.    ROBOTIC HYSTERECTOMY, WITH SALPINGECTOMY Bilateral     TONSILLECTOMY       Family History   Problem Relation Age of Onset    Hyperlipidemia Mother     Coronary artery disease Father     Hyperlipidemia Father     Hypertension Father     No Known Problems Sister     No Known Problems Maternal Aunt     No Known Problems Maternal Uncle     No Known Problems Paternal Aunt     No Known Problems Paternal Uncle     No Known Problems Maternal Grandmother     Diabetes type II Maternal Grandfather     Hypertension Paternal Grandmother     Hyperlipidemia Paternal Grandmother     Heart disease  Paternal Grandmother     Lung cancer Paternal Grandmother     Lung cancer Paternal Grandfather     Amblyopia Neg Hx     Blindness Neg Hx     Cancer Neg Hx     Cataracts Neg Hx     Diabetes Neg Hx     Glaucoma Neg Hx     Macular degeneration Neg Hx     Retinal detachment Neg Hx     Strabismus Neg Hx     Stroke Neg Hx     Thyroid disease Neg Hx      Social History     Socioeconomic History    Marital status:    Tobacco Use    Smoking status: Former     Current packs/day: 0.00     Average packs/day: 0.3 packs/day for 10.0 years (2.5 ttl pk-yrs)     Types: Cigarettes     Start date:      Quit date:      Years since quittin.1    Smokeless tobacco: Never   Substance and Sexual Activity    Alcohol use: Yes     Comment: Socially    Drug use: No    Sexual activity: Yes     Partners: Male     Birth control/protection: See Surgical Hx     Medication List with Changes/Refills   Current Medications    ALPRAZOLAM (XANAX) 0.25 MG TABLET    Take 0.25 mg by mouth nightly as needed.    CLONAZEPAM (KLONOPIN) 1 MG TABLET    TAKE 1 TABLET BY MOUTH EVERY EVENING.    DOXYCYCLINE (ORACEA) 40 MG CAPSULE    Take 1 capsule (40 mg total) by mouth once daily.    EZETIMIBE (ZETIA) 10 MG TABLET    Take 1 tablet (10 mg total) by mouth once daily.    FLUCONAZOLE (DIFLUCAN) 150 MG TAB    Take 1 tablet (150 mg total) by mouth once daily.    IVERMECTIN (SOOLANTRA) 1 % CREA        LEVOTHYROXINE (SYNTHROID) 100 MCG TABLET    TAKE 1 TABLET BY MOUTH BEFORE BREAKFAST.    LIOTHYRONINE (CYTOMEL) 5 MCG TAB    Take 2 tablets (10 mcg total) by mouth once daily.    LISDEXAMFETAMINE (VYVANSE) 20 MG CAPSULE    Take 1 capsule (20 mg total) by mouth every morning.    LORATADINE (CLARITIN) 10 MG TABLET    Take 1 tablet (10 mg total) by mouth daily as needed for Allergies.    LOSARTAN (COZAAR) 100 MG TABLET    Take 1 tablet (100 mg total) by mouth nightly.    METOPROLOL SUCCINATE (TOPROL-XL) 25 MG 24 HR TABLET    TAKE 1 TABLET BY MOUTH EVERY DAY     MINOXIDIL (LONITEN) 2.5 MG TABLET    Take 0.5 tablets (1.25 mg total) by mouth once daily.    MUPIROCIN (BACTROBAN) 2 % OINTMENT    Apply to nostrils 2 (two) times daily.    NITROFURANTOIN, MACROCRYSTAL-MONOHYDRATE, (MACROBID) 100 MG CAPSULE    Take 1 capsule (100 mg total) by mouth 2 (two) times daily    PANTOPRAZOLE (PROTONIX) 40 MG TABLET    Take 1 tablet (40 mg total) by mouth every evening.    PHENTERMINE (LOMAIRA) 8 MG TAB    Take 1 tablet by mouth 2 (two) times daily as needed (appetite).    ROSUVASTATIN (CRESTOR) 40 MG TAB    Take 1 tablet (40 mg total) by mouth every evening.    VENLAFAXINE (EFFEXOR-XR) 150 MG CP24    TAKE 1 CAPSULE (150 MG TOTAL) BY MOUTH ONCE DAILY.     Review of patient's allergies indicates:   Allergen Reactions    Aldactone [spironolactone] Swelling    Remdesivir Hives    Sulfa (sulfonamide antibiotics) Edema    Trimethoprim      Other Reaction(s): SWELLING OF MUCUS MEMBRANES       Physical Exam:   There is no height or weight on file to calculate BMI.    Physical Exam  Detailed MSK exam:   Ortho/SPM Exam     Imaging:  X-ray Knee Ortho Left  Narrative: EXAMINATION:  XR KNEE ORTHO LEFT    CLINICAL HISTORY:  Pain in left knee    COMPARISON:  08/02/2021    FINDINGS:  There is no fracture. There is no dislocation.  Impression: Normal study.    Electronically signed by: Jimmy Cosby MD  Date:    02/05/2024  Time:    09:47  X-Ray Elbow Complete 3 views Right  Narrative: EXAMINATION:  XR ELBOW COMPLETE 3 VIEW RIGHT    CLINICAL HISTORY:  Pain in right elbow    COMPARISON:  None    FINDINGS:  There is no fracture. There is no dislocation.  There is a 6 mm spur off of the lateral aspect of the distal portion of the humerus.  Impression: There is a 6 mm spur off of the lateral aspect of the distal portion of the humerus.    Electronically signed by: Jimmy Cosby MD  Date:    02/05/2024  Time:    09:45  X-Ray Hip 4 or more views Right (with Pelvis when performed)  Narrative: EXAMINATION:  XR  HIP WITH PELVIS WHEN PERFORMED, 4 OR MORE VIEWS RIGHT    CLINICAL HISTORY:  Pain in right hip    COMPARISON:  None    FINDINGS:  There is no fracture. There is no dislocation.  The joint space of the right hip is normal in appearance.  Impression: Normal study.    Electronically signed by: Jimmy Cosby MD  Date:    02/05/2024  Time:    09:42      Relevant imaging results were reviewed and interpreted by me and per my read:   Left knee - mild medial and patellofemoral compartment narrowing, with minimal osteophytosis.  Right elbow - calcification of the lateral epicondyle, minimal degenerative changes about the elbow joint itself.    Bilateral hips - mild osteoarthritic changes, calcifications present at the greater trochanter/gluteal tendon insertions.    This was discussed with the patient and / or family today.     Patient Instructions   Assessment:  Mable Redding is a 53 y.o. female with a chief complaint of Pain of the Left Knee, Pain of the Right Elbow, Pain of the Right Hip, and Pain of the Left Hip    Encounter Diagnoses   Name Primary?    Primary osteoarthritis of left knee Yes    Chronic pain of left knee     Right lateral epicondylitis     Right elbow pain     Chronic hip pain, bilateral     Greater trochanteric pain syndrome of both lower extremities     Tendinopathy of gluteal region, bilateral       Plan:  XR reviewed  Left knee - mild degenerative changes  Right elbow - enthesopathy at the lateral epicondyle  Bilateral hips - calcific tendinopathy at greater trochanteric bursa  Labs reviewed - Cr 0.8, GFR > 60, LFTs WNL  History and clinical exam consistent with:  Left knee pain due to acute inflammatory flare, likely due to underlying degenerative disease, osteoarthritis, degenerative meniscus.    Right elbow pain due to acute lateral epicondylitis, likely in the setting of chronic lateral epicondylosis.    Bilateral hip pain due to greater trochanteric pain syndrome, gluteal tendinopathy.     Treatment plan:  Left knee - corticosteroid injection today, continue knee brace as needed  Proper protocols after the injection included: no submerging pools, baths tubs, or hot tubs for 24 hr.  Showering is okay today.  Side effects of the corticosteroid injection can include elevated blood glucose levels and blood pressures, so if you are taking medications for these, please monitor closely, and contact your PCP if any issues.  Red flag symptoms include fever, chills, nausea, vomiting, red, warm, tender joint at the area of injection.  If you are noticing these symptoms, they may be indicative of an infection, and please seek medical care immediately, either by calling our clinic or going to the emergency room.  Right elbow - recommend use of wrist brace at night, and a counterforce strap during the day.  If not improving, can consider for corticosteroid injection, vs PRP injection, vs minimally invasive tenotomy with TenJet.    Bilateral hips - discussed treatment options, including therapy, vs corticosteroid injection, vs minimally invasive tenotomy with TenJet.  Patient would like to proceed with TenJet, we will 1st obtain MRIs better evaluate, then we will proceed with TenJet, if appropriate    Follow-up: Will obtain bilateral hip MRI, then likely plan for right hip TenJet or sooner if there are any problems between now and then.    Thank you for choosing [facility] and Dr. Daniel Srinivasan for your orthopedic & sports medicine care. It is our goal to provide you with exceptional care that will help keep you healthy, active, and get you back in the game.    Please do not hesitate to reach out to us via email, phone, or MyChart with any questions, concerns, or feedback.    If you are experiencing pain/discomfort ,or have questions after 5pm and would like to be connected to the [facility] on-call team, please call this number and specify which Sports  Medicine provider is treating you: [phone]           A copy of today's visit note has been sent to the referring provider.           Daniel Srinivasan MD  Primary Care Sports Medicine    Disclaimer: This note was prepared using a voice recognition system and is likely to have sound alike errors within the text.   Delete the

## 2024-02-08 ENCOUNTER — PATIENT MESSAGE (OUTPATIENT)
Dept: PRIMARY CARE CLINIC | Facility: CLINIC | Age: 54
End: 2024-02-08
Payer: COMMERCIAL

## 2024-02-08 RX ORDER — LISDEXAMFETAMINE DIMESYLATE 30 MG/1
30 CAPSULE ORAL EVERY MORNING
Qty: 30 CAPSULE | Refills: 0 | Status: SHIPPED | OUTPATIENT
Start: 2024-02-08 | End: 2024-02-09 | Stop reason: SDUPTHER

## 2024-02-09 ENCOUNTER — PATIENT MESSAGE (OUTPATIENT)
Dept: PRIMARY CARE CLINIC | Facility: CLINIC | Age: 54
End: 2024-02-09
Payer: COMMERCIAL

## 2024-02-09 RX ORDER — LISDEXAMFETAMINE DIMESYLATE 30 MG/1
30 CAPSULE ORAL EVERY MORNING
Qty: 30 CAPSULE | Refills: 0 | Status: SHIPPED | OUTPATIENT
Start: 2024-02-09 | End: 2024-04-29

## 2024-02-19 DIAGNOSIS — M77.11 RIGHT LATERAL EPICONDYLITIS: Primary | ICD-10-CM

## 2024-02-19 DIAGNOSIS — M25.521 RIGHT ELBOW PAIN: ICD-10-CM

## 2024-02-26 DIAGNOSIS — M25.551 CHRONIC HIP PAIN, BILATERAL: ICD-10-CM

## 2024-02-26 DIAGNOSIS — G89.29 CHRONIC HIP PAIN, BILATERAL: ICD-10-CM

## 2024-02-26 DIAGNOSIS — M25.552 CHRONIC HIP PAIN, BILATERAL: ICD-10-CM

## 2024-02-26 DIAGNOSIS — M77.11 RIGHT LATERAL EPICONDYLITIS: ICD-10-CM

## 2024-02-26 DIAGNOSIS — M25.521 RIGHT ELBOW PAIN: Primary | ICD-10-CM

## 2024-03-08 DIAGNOSIS — L65.9 HAIR LOSS: ICD-10-CM

## 2024-03-08 DIAGNOSIS — L73.9 FOLLICULITIS: ICD-10-CM

## 2024-03-08 RX ORDER — DOXYCYCLINE 40 MG/1
40 CAPSULE ORAL DAILY
Qty: 90 CAPSULE | Refills: 2 | Status: SHIPPED | OUTPATIENT
Start: 2024-03-08

## 2024-03-15 RX ORDER — LORATADINE 10 MG/1
TABLET ORAL
Qty: 90 TABLET | Refills: 2 | Status: SHIPPED | OUTPATIENT
Start: 2024-03-15

## 2024-03-15 RX ORDER — PANTOPRAZOLE SODIUM 40 MG/1
40 TABLET, DELAYED RELEASE ORAL NIGHTLY
Qty: 90 TABLET | Refills: 2 | Status: SHIPPED | OUTPATIENT
Start: 2024-03-15

## 2024-03-18 ENCOUNTER — PROCEDURE VISIT (OUTPATIENT)
Dept: SPORTS MEDICINE | Facility: CLINIC | Age: 54
End: 2024-03-18
Payer: COMMERCIAL

## 2024-03-18 DIAGNOSIS — M25.551 RIGHT HIP PAIN: Primary | ICD-10-CM

## 2024-03-18 DIAGNOSIS — M67.951 TENDINOPATHY OF RIGHT GLUTEAL REGION: ICD-10-CM

## 2024-03-18 DIAGNOSIS — M25.521 RIGHT ELBOW PAIN: ICD-10-CM

## 2024-03-18 DIAGNOSIS — M25.562 CHRONIC PAIN OF LEFT KNEE: ICD-10-CM

## 2024-03-18 DIAGNOSIS — M25.551 GREATER TROCHANTERIC PAIN SYNDROME OF RIGHT LOWER EXTREMITY: ICD-10-CM

## 2024-03-18 DIAGNOSIS — S56.511A PARTIAL TEAR OF COMMON EXTENSOR TENDON OF RIGHT ELBOW: ICD-10-CM

## 2024-03-18 DIAGNOSIS — Z98.890 HISTORY OF MENISCECTOMY OF LEFT KNEE: ICD-10-CM

## 2024-03-18 DIAGNOSIS — G89.29 CHRONIC PAIN OF LEFT KNEE: ICD-10-CM

## 2024-03-18 DIAGNOSIS — M17.12 PRIMARY OSTEOARTHRITIS OF LEFT KNEE: Primary | ICD-10-CM

## 2024-03-18 PROCEDURE — 20611 DRAIN/INJ JOINT/BURSA W/US: CPT | Mod: RT,S$GLB,, | Performed by: STUDENT IN AN ORGANIZED HEALTH CARE EDUCATION/TRAINING PROGRAM

## 2024-03-18 PROCEDURE — 99499 UNLISTED E&M SERVICE: CPT | Mod: S$GLB,,, | Performed by: STUDENT IN AN ORGANIZED HEALTH CARE EDUCATION/TRAINING PROGRAM

## 2024-03-18 RX ORDER — TRIAMCINOLONE ACETONIDE 40 MG/ML
40 INJECTION, SUSPENSION INTRA-ARTICULAR; INTRAMUSCULAR
Status: DISCONTINUED | OUTPATIENT
Start: 2024-03-18 | End: 2024-03-18 | Stop reason: HOSPADM

## 2024-03-18 RX ADMIN — TRIAMCINOLONE ACETONIDE 40 MG: 40 INJECTION, SUSPENSION INTRA-ARTICULAR; INTRAMUSCULAR at 03:03

## 2024-03-18 NOTE — PROCEDURES
Large Joint Aspiration/Injection: R greater trochanteric bursa    Date/Time: 3/18/2024 3:40 PM    Performed by: Daniel Srinivasan MD  Authorized by: Daniel Srinivasan MD    Consent Done?:  Yes (Verbal)  Indications:  Pain and diagnostic evaluation  Site marked: the procedure site was marked    Timeout: prior to procedure the correct patient, procedure, and site was verified      Local anesthesia used?: Yes    Local anesthetic:  Topical anesthetic, lidocaine 1% without epinephrine and bupivacaine 0.5% without epinephrine  Anesthetic total (ml):  4      Details:  Needle Size:  22 G  Ultrasonic Guidance for needle placement?: Yes    Images are saved and documented.  Approach:  Lateral  Location:  Hip  Site:  R greater trochanteric bursa  Medications:  40 mg triamcinolone acetonide 40 mg/mL  Patient tolerance:  Patient tolerated the procedure well with no immediate complications     Ultrasound guidance was used for needle localization. Images were saved and stored for documentation. The appropriate structures were visualized. Dynamic visualization of the needle was continuous throughout the procedures and maintained good position.     We discussed the proper protocols after the injection such as no submerging pools, baths tubs, or hot tubs for 24 hr.  Showering is okay today.  We also discussed that blood sugars can be elevated after an injection and asked patient to properly check their sugars over the next few days and contact their PCP if there are any concerns.  We discussed red flags such as fevers, chills, red, warm, tender joint at the area of injection to please seek medical care immediately.

## 2024-03-18 NOTE — PATIENT INSTRUCTIONS
Assessment:  Mable Redding is a 53 y.o. female with a chief complaint of No chief complaint on file.    Encounter Diagnoses   Name Primary?    Right hip pain Yes    Greater trochanteric pain syndrome of right lower extremity     Tendinopathy of right gluteal region     Right elbow pain     Partial tear of common extensor tendon of right elbow       Plan:  Right hip greater trochanteric bursa corticosteroid injection today.    Proper protocols after the injection included: no submerging pools, baths tubs, or hot tubs for 24 hr.  Showering is okay today.  Side effects of the corticosteroid injection can include elevated blood glucose levels and blood pressures, so if you are taking medications for these, please monitor closely, and contact your PCP if any issues.  Red flag symptoms include fever, chills, nausea, vomiting, red, warm, tender joint at the area of injection.  If you are noticing these symptoms, they may be indicative of an infection, and please seek medical care immediately, either by calling our clinic or going to the emergency room.  Referral for surgical evaluation for right elbow, with MRI showing severe, high-grade partial tearing of the common extensor tendon at the lateral epicondyle.  We will place her Durolane injection for left knee.    MEDICAL NECESSITY FOR VISCOSUPPLEMENTATION: After thorough evaluation of the patient, I have determined that visco-supplementation is medically necessary. The patient has painful DJD of the knee with failure of conservative therapy including lifestyle modifications and rehabilitation exercises. Oral analgesis/NSAIDs have not adequately controlled symptoms and there is radiographic evidence of joint space narrowing, subchondral sclerosis, and some early osteophytic changes, or in lack of radiographic evidence, there is arthroscopic or other evidence of chondrosis.  Kellgren- David grade 2 or greater.    Follow-up:  4 weeks for left knee Durolane  injection or sooner if there are any problems between now and then.    Thank you for choosing [facility] and Dr. Daniel Srinivasan for your orthopedic & sports medicine care. It is our goal to provide you with exceptional care that will help keep you healthy, active, and get you back in the game.    Please do not hesitate to reach out to us via email, phone, or MyChart with any questions, concerns, or feedback.    If you are experiencing pain/discomfort ,or have questions after 5pm and would like to be connected to the [facility] on-call team, please call this number and specify which Sports Medicine provider is treating you: [phone]

## 2024-03-22 DIAGNOSIS — F45.8 BRUXISM: ICD-10-CM

## 2024-03-22 RX ORDER — CLONAZEPAM 1 MG/1
1 TABLET ORAL NIGHTLY
Qty: 90 TABLET | Refills: 1 | Status: SHIPPED | OUTPATIENT
Start: 2024-03-22

## 2024-04-02 ENCOUNTER — OFFICE VISIT (OUTPATIENT)
Dept: ORTHOPEDICS | Facility: CLINIC | Age: 54
End: 2024-04-02
Payer: COMMERCIAL

## 2024-04-02 VITALS — BODY MASS INDEX: 34.4 KG/M2 | WEIGHT: 214.06 LBS | HEIGHT: 66 IN

## 2024-04-02 DIAGNOSIS — S53.439A SPRAIN OF LATERAL COLLATERAL LIGAMENT OF ELBOW: Primary | ICD-10-CM

## 2024-04-02 DIAGNOSIS — S56.519A PARTIAL TEAR OF COMMON EXTENSOR TENDON OF ELBOW: ICD-10-CM

## 2024-04-02 PROCEDURE — 99999 PR PBB SHADOW E&M-EST. PATIENT-LVL V: CPT | Mod: PBBFAC,,, | Performed by: STUDENT IN AN ORGANIZED HEALTH CARE EDUCATION/TRAINING PROGRAM

## 2024-04-02 PROCEDURE — 99204 OFFICE O/P NEW MOD 45 MIN: CPT | Mod: S$GLB,,, | Performed by: STUDENT IN AN ORGANIZED HEALTH CARE EDUCATION/TRAINING PROGRAM

## 2024-04-02 NOTE — PROGRESS NOTES
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  Patient sustained a fall while handling cattle a proximally 1 month ago.  Initially, she did not notice much pain.  Starting a couple of days later, she began to notice pain in her elbow which has been worsening over time.  Pain level is a 5/10.  No numbness or tingling.  Patient experiences pain with lifting and twisting activities.  Pain is localized to the lateral elbow.  Patient previously saw Dr. Srinivasan with sports medicine for this issue.  An MRI was obtained of the right elbow.  Patient was referred to me for further evaluation and treatment.  Patient was not experience clicking and catching/mechanical symptoms at the lateral elbow.  Additionally, she does not experience pain with pushing off and rising from a chair.    Review of Systems  Review of systems negative for chest pain, shortness of breath, fevers, chills, nausea/vomiting.    Past Medical History  Past Medical History:   Diagnosis Date    Anxiety     Bursitis     Right hip    COVID-19 11/3/2021    Depression     Digestive disorder     Herpes genitalia     History of blood clots     HPV (human papilloma virus) infection     HTN (hypertension) 5/17/2022    Lupus anticoagulant disorder     Lupus anticoagulant disorder 1/30/2019    Pulmonary embolus     Thyroid disease        Past Surgical History  Past Surgical History:   Procedure Laterality Date    ADENOIDECTOMY      ARTHROSCOPIC CHONDROPLASTY OF KNEE JOINT Left 5/27/2022    Procedure: ARTHROSCOPY, KNEE, WITH CHONDROPLASTY;  Surgeon: Rudy Dempsey MD;  Location: [facility];  Service: Orthopedics;  Laterality: Left;  Left knee chondroplasty of the left knee medial femoral condyle and medial tibial plateau    ARTHROSCOPY OF KNEE  Left 2022    Procedure: ARTHROSCOPY, KNEE;  Surgeon: Rudy Dempsey MD;  Location: [facility] OR;  Service: Orthopedics;  Laterality: Left;     SECTION      DILATION AND CURETTAGE OF UTERUS      HYSTERECTOMY  2017    HYSTEROSCOPY      KNEE ARTHROSCOPY W/ MENISCECTOMY Left 2022    Procedure: ARTHROSCOPY, KNEE, WITH MENISCECTOMY;  Surgeon: Rudy Dempsey MD;  Location: [facility] OR;  Service: Orthopedics;  Laterality: Left;  partial medial meniscectomy (approximately 60-70% left of the posterior horn) and lateral meniscectomy (small flap on anterior horn    LIPOMA RESECTION      OTHER SURGICAL HISTORY      Patient states she feel SOB in recovery and BP drops. Requests to be placed on side or sitting up.    ROBOTIC HYSTERECTOMY, WITH SALPINGECTOMY Bilateral     TONSILLECTOMY         Allergies  Review of patient's allergies indicates:   Allergen Reactions    Aldactone [spironolactone] Swelling    Remdesivir Hives    Sulfa (sulfonamide antibiotics) Edema    Trimethoprim      Other Reaction(s): SWELLING OF MUCUS MEMBRANES       Family History  Family History   Problem Relation Age of Onset    Hyperlipidemia Mother     Coronary artery disease Father     Hyperlipidemia Father     Hypertension Father     No Known Problems Sister     No Known Problems Maternal Aunt     No Known Problems Maternal Uncle     No Known Problems Paternal Aunt     No Known Problems Paternal Uncle     No Known Problems Maternal Grandmother     Diabetes type II Maternal Grandfather     Hypertension Paternal Grandmother     Hyperlipidemia Paternal Grandmother     Heart disease Paternal Grandmother     Lung cancer Paternal Grandmother     Lung cancer Paternal Grandfather     Amblyopia Neg Hx     Blindness Neg Hx     Cancer Neg Hx     Cataracts Neg Hx     Diabetes Neg Hx     Glaucoma Neg Hx     Macular degeneration Neg Hx     Retinal detachment Neg Hx     Strabismus Neg Hx     Stroke Neg Hx     Thyroid disease Neg Hx        Social  History  Social History     Socioeconomic History    Marital status:    Tobacco Use    Smoking status: Former     Current packs/day: 0.00     Average packs/day: 0.3 packs/day for 10.0 years (2.5 ttl pk-yrs)     Types: Cigarettes     Start date:      Quit date:      Years since quittin.2     Passive exposure: Never    Smokeless tobacco: Never   Substance and Sexual Activity    Alcohol use: Yes     Comment: Socially    Drug use: No    Sexual activity: Yes     Partners: Male     Birth control/protection: See Surgical Hx       Vital Signs  There were no vitals filed for this visit.    Physical Exam  Constitutional: Appears well-developed and well-nourished. No distress.   HENT:   Head: Normocephalic.   Eyes: EOM are normal.   Pulmonary/Chest: Effort normal.   Neurological: Oriented to person, place, and time.   Psychiatric: Normal mood and affect.     Right Upper Extremity:  No abrasions, lacerations, wounds.  No swelling.  No erythema.  No drainage.  Patient was able to make a fist and extend all her fingers.  Patient has full active elbow flexion and extension.  Full pronation and supination.  No pain or mechanical symptoms with chair rise test.  No subluxation/instability with lateral pivot shift test or posterior drawer test.  Negative apprehension test.  Patient has tenderness over the lateral epicondyle as well as over the radiocapitellar articulation.  Patient has pain with resisted wrist extension.  No tenderness over the medial epicondyle.  No pain with resisted wrist flexion.  Sensation is intact in the median, radial, ulnar nerve distributions.  Palpable radial pulse.    Imaging  Right elbow MRI with contrast was obtained on 2024 and independently reviewed by myself.  Patient was noted to have an effusion at the elbow.  There was noted to be of the lateral collateral ligament as well as the annular ligament.  The medial collateral ligament is intact.  There is a high-grade partial  tear of the common extensor tendon with surrounding soft tissue edema.  The biceps, triceps, brachioradialis are all intact.    Assessment and Plan  53-year-old right-hand dominant female presents for evaluation of a right elbow injury 1 month ago in which she sustained a partial tear to the common extensor tendon as well as a partial tear to the lateral collateral ligament.  I discussed that this will likely improve with time but it can take several months.  I recommended a course of physical therapy to help with the recovery process.  Patient agreed to proceed.  Physical therapy referral was placed.  Follow up in 2 months for re-evaluation.  No x-rays needed at that visit.    Jeannie Cárdenas MD  Orthopaedic Hand Surgery

## 2024-04-04 ENCOUNTER — TELEPHONE (OUTPATIENT)
Dept: SPORTS MEDICINE | Facility: CLINIC | Age: 54
End: 2024-04-04
Payer: COMMERCIAL

## 2024-04-08 ENCOUNTER — PROCEDURE VISIT (OUTPATIENT)
Dept: SPORTS MEDICINE | Facility: CLINIC | Age: 54
End: 2024-04-08
Payer: COMMERCIAL

## 2024-04-08 DIAGNOSIS — G89.29 CHRONIC PAIN OF LEFT KNEE: ICD-10-CM

## 2024-04-08 DIAGNOSIS — M25.562 CHRONIC PAIN OF LEFT KNEE: ICD-10-CM

## 2024-04-08 DIAGNOSIS — M17.12 PRIMARY OSTEOARTHRITIS OF LEFT KNEE: Primary | ICD-10-CM

## 2024-04-08 PROCEDURE — 20611 DRAIN/INJ JOINT/BURSA W/US: CPT | Mod: LT,S$GLB,, | Performed by: STUDENT IN AN ORGANIZED HEALTH CARE EDUCATION/TRAINING PROGRAM

## 2024-04-08 PROCEDURE — 99499 UNLISTED E&M SERVICE: CPT | Mod: S$GLB,,, | Performed by: STUDENT IN AN ORGANIZED HEALTH CARE EDUCATION/TRAINING PROGRAM

## 2024-04-08 NOTE — PROCEDURES
Large Joint Aspiration/Injection: L knee    Date/Time: 4/8/2024 12:00 PM    Performed by: Daniel Srinivasan MD  Authorized by: Daniel Srinivasan MD    Consent Done?:  Yes (Verbal)  Indications:  Arthritis and pain  Site marked: the procedure site was marked    Timeout: prior to procedure the correct patient, procedure, and site was verified      Local anesthesia used?: Yes    Local anesthetic:  Topical anesthetic    Details:  Needle Size:  22 G  Ultrasonic Guidance for needle placement?: Yes    Images are saved and documented.  Approach: superolateral.  Location:  Knee  Site:  L knee  Medications:  60 mg hyaluronate sodium, stabilized (DUROLANE) 60 mg/3 mL  Patient tolerance:  Patient tolerated the procedure well with no immediate complications     Ultrasound guidance was used for needle localization. Images were saved and stored for documentation. The appropriate structures were visualized. Dynamic visualization of the needle was continuous throughout the procedures and maintained good position.     We discussed the proper protocols after the injection such as no submerging pools, baths tubs, or hot tubs for 24 hr.  Showering is okay today.  We also discussed that blood sugars can be elevated after an injection and asked patient to properly check their sugars over the next few days and contact their PCP if there are any concerns.  We discussed red flags such as fevers, chills, red, warm, tender joint at the area of injection to please seek medical care immediately.

## 2024-04-08 NOTE — PATIENT INSTRUCTIONS
Assessment:  Mable Redding is a 53 y.o. female with a chief complaint of No chief complaint on file.    Encounter Diagnoses   Name Primary?    Primary osteoarthritis of left knee Yes    Chronic pain of left knee       Plan:  Left knee Durolane injection today.    Proper protocols after the aspiration included: no submerging pools, baths tubs, or hot tubs for 24 hr.  Showering is okay today.  Red flag symptoms include fever, chills, nausea, vomiting, red, warm, tender joint at the area of injection.  If you are noticing these symptoms, they may be indicative of an infection, and please seek medical care immediately, either by calling our clinic or going to the emergency room.    Follow-up:  2 months or sooner if there are any problems between now and then.    Thank you for choosing [facility] and Dr. Daniel Srinivasan for your orthopedic & sports medicine care. It is our goal to provide you with exceptional care that will help keep you healthy, active, and get you back in the game.    Please do not hesitate to reach out to us via email, phone, or MyChart with any questions, concerns, or feedback.    If you are experiencing pain/discomfort ,or have questions after 5pm and would like to be connected to the [facility] on-call team, please call this number and specify which Sports Medicine provider is treating you: [phone]

## 2024-04-12 ENCOUNTER — CLINICAL SUPPORT (OUTPATIENT)
Dept: REHABILITATION | Facility: HOSPITAL | Age: 54
End: 2024-04-12
Attending: STUDENT IN AN ORGANIZED HEALTH CARE EDUCATION/TRAINING PROGRAM
Payer: COMMERCIAL

## 2024-04-12 DIAGNOSIS — M62.81 DECREASED MUSCLE STRENGTH: ICD-10-CM

## 2024-04-12 DIAGNOSIS — S53.439A SPRAIN OF LATERAL COLLATERAL LIGAMENT OF ELBOW: ICD-10-CM

## 2024-04-12 DIAGNOSIS — M25.60 DECREASED RANGE OF MOTION: ICD-10-CM

## 2024-04-12 DIAGNOSIS — S56.519A PARTIAL TEAR OF COMMON EXTENSOR TENDON OF ELBOW: ICD-10-CM

## 2024-04-12 DIAGNOSIS — Z78.9 DECREASED ACTIVITIES OF DAILY LIVING (ADL): ICD-10-CM

## 2024-04-12 DIAGNOSIS — R52 PAIN: Primary | ICD-10-CM

## 2024-04-12 PROCEDURE — 97035 APP MDLTY 1+ULTRASOUND EA 15: CPT

## 2024-04-12 PROCEDURE — 97166 OT EVAL MOD COMPLEX 45 MIN: CPT

## 2024-04-12 NOTE — PLAN OF CARE
[facility]   Occupational Therapy Initial Evaluation     Date: 4/12/2024  [id number]    Therapy Diagnosis:   Encounter Diagnoses   Name Primary?    Sprain of lateral collateral ligament of elbow     Partial tear of common extensor tendon of elbow     Pain Yes    Decreased range of motion     Decreased muscle strength     Decreased activities of daily living (ADL)      Physician: Jeannie Cárdenas MD    Physician Orders: Occupational Therapy to Evaluate and Treat  Medical Diagnosis:   S53.439A (ICD-10-CM) - Sprain of lateral collateral ligament of elbow   S56.519A (ICD-10-CM) - Partial tear of common extensor tendon of elbow     Surgical Procedure and Date: N/A    Evaluation Date: 4/12/2024  Insurance Authorization Period Expiration: 4/5/2024 - 4/5/2025  Plan of Care Certification Period: 4/12/2024 - 7/12/2024  Progress Note Due: 5/12/2024     Date of Return to MD: N/A  Visit # / Visits authorized: 1/1  FOTO: 1/3    Precautions:  Standard    Time In: 12:45  Time Out: 1:40  Total Appointment Time (timed & untimed codes): 50 minutes    Subjective     Date of Onset: About 6 weeks ago  Mechanism of Injury/ History of Current Condition: Patient reports that 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 She had an MRI performed which showed a partial common extensor tendon tear and sprain of lateral collateral ligament of her elbow. She reports her pain has been consistent at about a 4/10. 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     Involved Side: right  Dominant Side: Right    Imaging: MRI a partial common extensor tendon tear and sprain of lateral collateral ligament of her elbow.    Previous Therapy: None    Patient's Goals for Therapy: Patient reported goals are to decrease overall pain levels in order to return to prior functional level.     Falls:     Pain:  Functional Pain Scale Rating 0-10:   2/10 on average  1/10 at best  5/10 at  worst  Location: right elbow  Description: Aching  Aggravating Factors: Lifting  Easing Factors: rest    Occupation: NP  Working Presently: Employed    Functional Limitations/Social History:    Prior Level of Function: Independent and pain free with all ADL, IADL, community mobility and functional activities.   Current Level of Function: Modified Independent with all ADL, IADL, community mobility and functional activities with reports of increased pain and need for increased time and frequent breaks.    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    Home/Living Environment : lives with their spouse  Home Access:   DME: none      Past Medical History/Physical Systems Review:   Medical History:   Past Medical History:   Diagnosis Date    Anxiety     Bursitis     Right hip    COVID-19 11/3/2021    Depression     Digestive disorder     Herpes genitalia     History of blood clots     HPV (human papilloma virus) infection     HTN (hypertension) 2022    Lupus anticoagulant disorder     Lupus anticoagulant disorder 2019    Pulmonary embolus     Thyroid disease        Surgical History:    has a past surgical history that includes Hysterectomy (2017); Tonsillectomy;  section; Dilation and curettage of uterus; Hysteroscopy; Lipoma resection; Adenoidectomy; robotic hysterectomy, with salpingectomy (Bilateral); Other surgical history; Arthroscopy of knee (Left, 2022); Knee arthroscopy w/ meniscectomy (Left, 2022); and Arthroscopic chondroplasty of knee joint (Left, 2022).    Medications:   has a current medication list which includes the following prescription(s): alprazolam, clonazepam, doxycycline, ezetimibe, finasteride, fluconazole, ivermectin, levothyroxine, liothyronine, lisdexamfetamine,  loratadine, losartan, metoprolol succinate, minoxidil, minoxidil, mupirocin, nitrofurantoin (macrocrystal-monohydrate), pantoprazole, phentermine, rosuvastatin, venlafaxine, and [DISCONTINUED] clobetasol, and the following Facility-Administered Medications: acetaminophen, albuterol, epinephrine, methylprednisolone sodium succinate, and sodium chloride 0.9%.    Allergies:   Review of patient's allergies indicates:   Allergen Reactions    Aldactone [spironolactone] Swelling    Remdesivir Hives    Sulfa (sulfonamide antibiotics) Edema    Trimethoprim      Other Reaction(s): SWELLING OF MUCUS MEMBRANES          Objective     Observation/Inspection:  Patient has some soft tissue swelling of her right elbow with pain with most movements    Sensation: Intact to light touch.    Scar: None       Active Range of Motion:    Joint Evaluation  AROM  4/12/2024 AROM  4/12/2024 Pain/Dysfunction with movement  4/12/2024 Goal    Left Right  Right   Shoulder Flexion 0-180 Within normal limits  Within normal limits      Shoulder Abduction 0-180       Shoulder External Rotation 0-90       Shoulder Internal Rotation 0-90       Shoulder Extension 0-60       Shoulder Horizontal Adduction 0-90       Elbow Flexion 0-150  130 4/10 Within normal limits   1/10   Elbow Extension 0  -30     Wrist Flexion 0-80  60     Wrist Extension 0-70  50     Wrist Supination 0-80  Within normal limits      Wrist Pronation 0-80                        Manual Muscles Test:  Strength Left Right Goal    4/12/2024 4/12/2024 Right   Shoulder Flexion 5/5 5/5    Shoulder Abduction      Shoulder Internal Rotation      Shoulder External Rotation      Elbow Flexion  4-/5 5/5   Elbow Extension      Pronation      Supination      Wrist Flexion      Wrist Extension                                               and Pinch Strength (in pounds, psi's):   Left Right Goal    4/12/2024 4/12/2024 Right    II 80 60 80          Intake Outcome Measure for [facility]  Survey    Therapist reviewed FOTO scores for Mable Redding on 4/12/2024.   FOTO documents entered into [employer] - see Media section.    Evaluation Intake Score: 45%         Treatment     Total Treatment time (time-based codes) separate from Evaluation: 20 minutes      Direct Contact Modalities: Modalities such as attended electrical stimulation, iontophoresis, ultrasound to reduce pain and increase soft tissue extensibility. Mable received (10) minutes of modalities listed below after being cleared for contraindications. x = modalities performed     Direct Contact Modalities 4/12/2024    0.8 MHz, 1.0 W/cm2, 50% to right elbow, to decrease pain & edema, increase circulation and tissue extensibility.  x 10 minutes                     Manual Therapy Techniques: Joint mobilizations, Soft tissue Mobilization, and mobilization with movement applied to the area listed below. Mable received (10) minutes of interventions. x = intervention performed today    Manual Intervention 4/12/2024    Extensive Soft Tissue Mobilization, Myofacial Release, Manual Lymphatic Drainage, IASTM, Cupping.  x Right elbow  to increase blood flow/circulation, improve soft tissue pliability and decrease pain   Joint Mobilizations     Mobilization with movement     Scar Management  Provided silicone scar gel sheet for scar management due to raised, hypertrophic scarring on .   Patient verbalized understanding of instructions provided on wear schedule, care and precautions to monitor.   Patient was also provided with a sleeve of Tubi  size  to wear at night      Home Exercise Program/Education:    Education provided:   Patient educated on the impairments noted above and the effects of Occupational therapy intervention to improve overall condition and Quality of Life.   Patient was educated on all the above exercise prior/during/after for proper posture, positioning, and execution for safe performance with home exercise  program.  Patient/Family Education: role of Occupational Therapy, goals for Occupational Therapy, scheduling/cancellations, and insurance limitations - patient verbalized understanding  Home Exercise Program - See Below    Written Home Exercises Provided: yes.  Exercises were reviewed and Mable was able to demonstrate them prior to the end of the session. Patient was advised to perform these exercises free of pain, and to stop performing them if pain occurs.  Mable demonstrated good  understanding of the education provided.     See EMR under Patient Instructions for exercises provided 4/12/2024.    Assessment     Mable Redding is a 53 y.o. female referred to outpatient occupational therapy and presents with a medical diagnosis of Sprain of lateral collateral ligament of elbow, Partial tear of common extensor tendon of elbow.    Following medical record review it is determined that pt will benefit from occupational therapy services in order to maximize pain free and/or functional use of right upper extremity. The following goals were discussed with the patient and patient is in agreement with them as to be addressed in the treatment plan. The patient's rehab potential is Good.     Anticipated barriers to occupational therapy: Pain, Overuse    Plan of care discussed with patient: Yes  Patient's spiritual, cultural and educational needs considered and patient is agreeable to the plan of care and goals as stated below:     Medical Necessity is demonstrated by the following  Occupational Profile/History  Co-morbidities and personal factors that may impact the plan of care [] LOW: Brief chart review  [x] MODERATE: Expanded chart review   [] HIGH: Extensive chart review    Moderate / High Support Documentation:   Past Medical History:   Diagnosis Date    Anxiety     Bursitis     Right hip    COVID-19 11/3/2021    Depression     Digestive disorder     Herpes genitalia     History of blood clots     HPV (human  papilloma virus) infection     HTN (hypertension) 5/17/2022    Lupus anticoagulant disorder     Lupus anticoagulant disorder 1/30/2019    Pulmonary embolus     Thyroid disease         Examination  Performance deficits relating to physical, cognitive or psychosocial skills that result in activity limitations and/or participation restrictions  [] LOW: addressing 1-3 Performance deficits  [x] MODERATE: 3-5 Performance deficits  [] HIGH: 5+ Performance deficits (please support below)    Moderate / High Support Documentation:    Physical:  Joint Mobility  Muscle Power/Strength  Muscle Endurance  Edema   Strength  Muscle Tone  Postural Control  Pain    Cognitive:  No Deficits    Psychosocial:    No Deficits     Treatment Options [] LOW: Limited options  [x] MODERATE: Several options  [] HIGH: Multiple options      Decision Making/ Complexity Score: moderate       The following goals were discussed with the patient and patient is in agreement with them as to be addressed in the treatment plan.     Goals:    Short Term Goals:  6 weeks  Progress      Pain: Patient will demonstrate improved pain by reports of less than or equal to 3/10 worst pain on the verbal rating scale in order to progress toward maximal functional ability and improve quality of life. PC   Function: Patient will demonstrate improved function as indicated by a functional intake score of more than or equal to 45 out of 100 on FOTO. PC   Mobility: Patient will improve AROM to 50% of stated goals, listed in objective measures above, in order to progress towards independence with functional activities.  PC   Strength: Patient will improve strength to 50% of stated goals, listed in objective measures above, in order to progress towards independence with functional activities.  PC   HEP: Patient will demonstrate independence with HEP in order to progress toward functional independence. PC     Long Term Goals:  12 weeks  Progress     Pain: Patient will  demonstrate improved pain by reports of less than or equal to 2/10 worst pain on the verbal rating scale in order to progress toward maximal functional ability and improve quality of life.   PC   Function: Patient will demonstrate improved function as indicated by a functional intake score of more than or equal to 69 out of 100 on FOTO. PC   Mobility: Patient will improve AROM to stated goals, listed in objective measures above, in order to return to maximal functional potential and improve quality of life. PC   Strength: Patient will improve strength to stated goals, listed in objective measures above, in order to improve functional independence and quality of life. PC   Patient will return to normal ADL's, IADL's, community involvement, recreational activities, and work-related activities with less than or equal to 4/10 pain and maximal function.  PC     Goals Key:  PC= Progressing/Continue; PM= Partially Met;  Goal Met= Goal Met      DC= Discontinue    Plan   Plan of Care Certification: 4/12/2024 to 7/12/2024.     Outpatient Occupational Therapy 1-2 times weekly for 12 weeks to include the following interventions:  Therapeutic Exercise, Functional Activities, Patient Education, Home Exercise Program, ADL Training, Transfer/Mobility Training, Manual Therapy, Neuromuscular Reeducation/Sensory, Electrical Stimulation/TENS/Interferential, Moist Heat/Ice/Paraffin, Orthotic Fabrication/Fit/Management, Ultrasound/Phonophoresis, and Edema Control/Fluidotherapy.    Kera Adorno, OT ,OTD, OTR/L, CLT      I CERTIFY THE NEED FOR THESE SERVICES FURNISHED UNDER THIS PLAN OF TREATMENT AND WHILE UNDER MY CARE  Physician's comments:      Physician's Signature: ___________________________________________________

## 2024-04-15 PROBLEM — M25.60 DECREASED RANGE OF MOTION: Status: ACTIVE | Noted: 2024-04-15

## 2024-04-15 PROBLEM — Z78.9 DECREASED ACTIVITIES OF DAILY LIVING (ADL): Status: ACTIVE | Noted: 2024-04-15

## 2024-04-15 PROBLEM — M62.81 DECREASED MUSCLE STRENGTH: Status: ACTIVE | Noted: 2024-04-15

## 2024-04-15 PROBLEM — R52 PAIN: Status: ACTIVE | Noted: 2024-04-15

## 2024-04-19 ENCOUNTER — CLINICAL SUPPORT (OUTPATIENT)
Dept: REHABILITATION | Facility: HOSPITAL | Age: 54
End: 2024-04-19
Payer: COMMERCIAL

## 2024-04-19 DIAGNOSIS — Z78.9 DECREASED ACTIVITIES OF DAILY LIVING (ADL): ICD-10-CM

## 2024-04-19 DIAGNOSIS — M62.81 DECREASED MUSCLE STRENGTH: ICD-10-CM

## 2024-04-19 DIAGNOSIS — J06.9 UPPER RESPIRATORY TRACT INFECTION, UNSPECIFIED TYPE: Primary | ICD-10-CM

## 2024-04-19 DIAGNOSIS — R52 PAIN: Primary | ICD-10-CM

## 2024-04-19 DIAGNOSIS — J01.10 ACUTE FRONTAL SINUSITIS, RECURRENCE NOT SPECIFIED: ICD-10-CM

## 2024-04-19 DIAGNOSIS — M25.60 DECREASED RANGE OF MOTION: ICD-10-CM

## 2024-04-19 PROCEDURE — 97035 APP MDLTY 1+ULTRASOUND EA 15: CPT

## 2024-04-19 PROCEDURE — 97110 THERAPEUTIC EXERCISES: CPT

## 2024-04-19 PROCEDURE — 97140 MANUAL THERAPY 1/> REGIONS: CPT

## 2024-04-19 RX ORDER — AMOXICILLIN AND CLAVULANATE POTASSIUM 875; 125 MG/1; MG/1
1 TABLET, FILM COATED ORAL EVERY 12 HOURS
Qty: 14 TABLET | Refills: 0 | Status: SHIPPED | OUTPATIENT
Start: 2024-04-19

## 2024-04-20 DIAGNOSIS — R93.1 HIGH CORONARY ARTERY CALCIUM SCORE: ICD-10-CM

## 2024-04-22 RX ORDER — LOSARTAN POTASSIUM 100 MG/1
100 TABLET ORAL NIGHTLY
Qty: 90 TABLET | Refills: 2 | Status: SHIPPED | OUTPATIENT
Start: 2024-04-22 | End: 2024-04-26 | Stop reason: SDUPTHER

## 2024-04-22 RX ORDER — ROSUVASTATIN CALCIUM 40 MG/1
40 TABLET, COATED ORAL NIGHTLY
Qty: 90 TABLET | Refills: 2 | Status: SHIPPED | OUTPATIENT
Start: 2024-04-22 | End: 2024-04-26 | Stop reason: SDUPTHER

## 2024-04-22 NOTE — PROGRESS NOTES
[facility] Therapy [facility] Wellness  Occupational Therapy Treatment Note     Date: 4/19/2024  [id number]    Therapy Diagnosis:   Encounter Diagnoses   Name Primary?    Pain Yes    Decreased range of motion     Decreased muscle strength     Decreased activities of daily living (ADL)      Physician: Jeannie Cárdenas MD    Physician Orders: Occupational Therapy to Evaluate and Treat  Medical Diagnosis:   S53.439A (ICD-10-CM) - Sprain of lateral collateral ligament of elbow   S56.519A (ICD-10-CM) - Partial tear of common extensor tendon of elbow      Surgical Procedure and Date: N/A     Evaluation Date: 4/12/2024  Insurance Authorization Period Expiration: 4/5/2024 - 4/5/2025  Plan of Care Certification Period: 4/12/2024 - 7/12/2024  Progress Note Due: 5/12/2024      Date of Return to MD: N/A  Visit # / Visits authorized: 1/1  FOTO: 1/3     Precautions:  Standard     Time In: 11:40  Time Out: 12:25  Total Treatment Time: 45 minutes  Total Billable Time: 45 minutes    Subjective     Patient reports: She is doing well. She continues to have pain in her elbow    she was compliant with home exercise program given last session.   Response to previous treatment: Tolerated well  Functional change: Improved swelling    Pain: 5/10  Location: right elbow    Objective     Objective measures updated at progress report unless specified.    Treatment     Supervised Modalities: Modalities such as hot/cold packs, traction, unattended electrical stimulation, paraffin bath, fluidotherapy to reduce pain and increase soft tissue extensibility. Mable received (0) minutes of modalities listed below after being cleared for contraindications.  x = modalities performed     Supervised Modalities 4/19/2024                          Direct Contact Modalities: Modalities such as attended electrical stimulation, iontophoresis, ultrasound to reduce pain and increase soft tissue extensibility. Mable received  (10) minutes of modalities listed below after being cleared for contraindications. x = modalities performed     Direct Contact Modalities 4/19/2024    0.8 MHz, 1.0 W/cm2, 50% to right elbow, to decrease pain & edema, increase circulation and tissue extensibility.  x 10 minutes                       Manual Therapy Techniques: Joint mobilizations, Soft tissue Mobilization, and mobilization with movement applied to the area listed below. Mable received (25) minutes of interventions. x = intervention performed today    Manual Intervention 4/19/2024    Extensive Soft Tissue Mobilization, Myofacial Release, Manual Lymphatic Drainage, IASTM, Cupping.  x Right elbow  to increase blood flow/circulation, improve soft tissue pliability and decrease pain   Joint Mobilizations     Mobilization with movement     Scar Management  Provided silicone scar gel sheet for scar management due to raised, hypertrophic scarring on .   Patient verbalized understanding of instructions provided on wear schedule, care and precautions to monitor.   Patient was also provided with a sleeve of Tubi  size  to wear at night       Therapeutic Exercises: to develop strength, endurance, ROM, flexibility, posture and core stabilization. Mable received (10) minutes of exercises listed below. x = performed today * = level of progression of activity     Therapeutic Exercise 4/19/2024    Forearm stretch 3 ways elbow bent x 3x - 15 second holds   Forearm stretch 3 ways elbow extended x 3x - 15 second holds   Bicep curls 3 ways un weighted x 1 set of 10             Therapeutic Activities: Everyday tasks to address the combined need of improved strength, range of motion, and endurance to achieve increased independence. While simulating these activities, the person is applying the gained skills of strength and improved range of motion in a practical way.  Mable received (0) minutes of activities listed below. x = activities performed today * = level of  progression of activity     Therapeutic Activities 4/19/2024                          Neuromuscular Re-education: the use of functional strengthening, stretching, balancing and coordination activities that train the nerves and muscles to react and communicate to restore normal body movement patterns to increase self care independence. Mable received (0) minutes of interventions listed below.  x = interventions performed today * = level of progression of activity     Neuromuscular Re-education 4/19/2024                          Self Care: Fundamental skills required to independently care for oneself. Activities of daily living such as eating, bathing, dressing, toileting, grooming, sleeping, transfers and mobility. Instrumental activities of daily living such as driving, medication management, pet care, home management/cleaning, financial management, using the phone, meal preparation and shopping. Mable received (0) minutes of interventions listed below.  x = interventions performed * = level of progression of activity     Self Care 4/19/2024                            Home Exercises and Education Provided     Education provided:   - Ergonomics  - progress towards goals     Written Home Exercises Provided: Patient instructed to cont prior HEP.  Exercises were reviewed and Mable was able to demonstrate them prior to the end of the session. Mable demonstrated good understanding of the home exercise program provided.     See EMR under Patient Instructions for exercises provided prior visit.        Assessment     Patient tolerated manual therapy well. She demonstrates decreased swelling in elbow and decreased pain      Mable is progressing towards her goals and there are no updates to goals at this time. Patient prognosis is Good.     Patient will continue to benefit from skilled outpatient occupational therapy to address the deficits listed in the problem list on initial evaluation provide patient/family education  and to maximize patient's level of independence in the home and community environment.     Patient's spiritual, cultural and educational needs considered and patient agreeable to plan of care and goals.    Anticipated barriers to occupational therapy: Pain, Overuse     Goals:     Short Term Goals:  6 weeks  Progress       Pain: Patient will demonstrate improved pain by reports of less than or equal to 3/10 worst pain on the verbal rating scale in order to progress toward maximal functional ability and improve quality of life. PC   Function: Patient will demonstrate improved function as indicated by a functional intake score of more than or equal to 45 out of 100 on FOTO. PC   Mobility: Patient will improve AROM to 50% of stated goals, listed in objective measures above, in order to progress towards independence with functional activities.  PC   Strength: Patient will improve strength to 50% of stated goals, listed in objective measures above, in order to progress towards independence with functional activities.  PC   HEP: Patient will demonstrate independence with HEP in order to progress toward functional independence. PC      Long Term Goals:  12 weeks  Progress      Pain: Patient will demonstrate improved pain by reports of less than or equal to 2/10 worst pain on the verbal rating scale in order to progress toward maximal functional ability and improve quality of life.   PC   Function: Patient will demonstrate improved function as indicated by a functional intake score of more than or equal to 69 out of 100 on FOTO. PC   Mobility: Patient will improve AROM to stated goals, listed in objective measures above, in order to return to maximal functional potential and improve quality of life. PC   Strength: Patient will improve strength to stated goals, listed in objective measures above, in order to improve functional independence and quality of life. PC   Patient will return to normal ADL's, IADL's, community  involvement, recreational activities, and work-related activities with less than or equal to 4/10 pain and maximal function.  PC      Goals Key:  PC= Progressing/Continue;       PM= Partially Met;       Goal Met= Goal Met      DC= Discontinue    Plan     Continue Plan of Care (POC) and progress per patient tolerance.      Kera Adorno, OT  ,OTD, OTR/L

## 2024-04-26 ENCOUNTER — CLINICAL SUPPORT (OUTPATIENT)
Dept: REHABILITATION | Facility: HOSPITAL | Age: 54
End: 2024-04-26
Payer: COMMERCIAL

## 2024-04-26 DIAGNOSIS — G47.9 DISTURBANCE OF SLEEP: ICD-10-CM

## 2024-04-26 DIAGNOSIS — R00.2 PALPITATION: ICD-10-CM

## 2024-04-26 DIAGNOSIS — E03.9 HYPOTHYROIDISM, UNSPECIFIED TYPE: ICD-10-CM

## 2024-04-26 DIAGNOSIS — Z78.9 DECREASED ACTIVITIES OF DAILY LIVING (ADL): ICD-10-CM

## 2024-04-26 DIAGNOSIS — J34.89 NASAL LESION: ICD-10-CM

## 2024-04-26 DIAGNOSIS — N95.1 HOT FLASHES DUE TO MENOPAUSE: ICD-10-CM

## 2024-04-26 DIAGNOSIS — M62.81 DECREASED MUSCLE STRENGTH: ICD-10-CM

## 2024-04-26 DIAGNOSIS — R93.1 HIGH CORONARY ARTERY CALCIUM SCORE: ICD-10-CM

## 2024-04-26 DIAGNOSIS — M25.60 DECREASED RANGE OF MOTION: ICD-10-CM

## 2024-04-26 DIAGNOSIS — R52 PAIN: Primary | ICD-10-CM

## 2024-04-26 PROCEDURE — 97110 THERAPEUTIC EXERCISES: CPT

## 2024-04-26 PROCEDURE — 97140 MANUAL THERAPY 1/> REGIONS: CPT

## 2024-04-26 PROCEDURE — 97035 APP MDLTY 1+ULTRASOUND EA 15: CPT

## 2024-04-28 RX ORDER — ROSUVASTATIN CALCIUM 40 MG/1
40 TABLET, COATED ORAL NIGHTLY
Qty: 90 TABLET | Refills: 2 | Status: SHIPPED | OUTPATIENT
Start: 2024-04-28

## 2024-04-28 RX ORDER — LISDEXAMFETAMINE DIMESYLATE 30 MG/1
30 CAPSULE ORAL EVERY MORNING
Qty: 30 CAPSULE | Refills: 0 | Status: CANCELLED | OUTPATIENT
Start: 2024-04-28

## 2024-04-28 RX ORDER — LIOTHYRONINE SODIUM 5 UG/1
10 TABLET ORAL DAILY
Qty: 180 TABLET | Refills: 1 | Status: SHIPPED | OUTPATIENT
Start: 2024-04-28

## 2024-04-28 RX ORDER — EZETIMIBE 10 MG/1
10 TABLET ORAL DAILY
Qty: 90 TABLET | Refills: 3 | Status: SHIPPED | OUTPATIENT
Start: 2024-04-28 | End: 2025-04-28

## 2024-04-28 RX ORDER — LEVOTHYROXINE SODIUM 100 UG/1
100 TABLET ORAL
Qty: 90 TABLET | Refills: 1 | Status: SHIPPED | OUTPATIENT
Start: 2024-04-28

## 2024-04-28 RX ORDER — METOPROLOL SUCCINATE 25 MG/1
25 TABLET, EXTENDED RELEASE ORAL DAILY
Qty: 90 TABLET | Refills: 2 | Status: SHIPPED | OUTPATIENT
Start: 2024-04-28

## 2024-04-28 RX ORDER — LOSARTAN POTASSIUM 100 MG/1
100 TABLET ORAL NIGHTLY
Qty: 90 TABLET | Refills: 2 | Status: SHIPPED | OUTPATIENT
Start: 2024-04-28

## 2024-04-29 RX ORDER — LISDEXAMFETAMINE DIMESYLATE 30 MG/1
30 CAPSULE ORAL EVERY MORNING
Qty: 30 CAPSULE | Refills: 0 | Status: SHIPPED | OUTPATIENT
Start: 2024-06-28 | End: 2024-07-28

## 2024-04-29 RX ORDER — LISDEXAMFETAMINE DIMESYLATE 30 MG/1
30 CAPSULE ORAL EVERY MORNING
Qty: 30 CAPSULE | Refills: 0 | Status: SHIPPED | OUTPATIENT
Start: 2024-05-29 | End: 2024-06-28

## 2024-04-29 RX ORDER — MUPIROCIN 20 MG/G
OINTMENT TOPICAL 2 TIMES DAILY
Qty: 22 G | Refills: 2 | Status: SHIPPED | OUTPATIENT
Start: 2024-04-29

## 2024-04-29 RX ORDER — LISDEXAMFETAMINE DIMESYLATE 30 MG/1
30 CAPSULE ORAL EVERY MORNING
Qty: 30 CAPSULE | Refills: 0 | Status: SHIPPED | OUTPATIENT
Start: 2024-04-29 | End: 2024-05-29

## 2024-04-29 RX ORDER — VENLAFAXINE HYDROCHLORIDE 150 MG/1
150 CAPSULE, EXTENDED RELEASE ORAL DAILY
Qty: 90 CAPSULE | Refills: 3 | Status: SHIPPED | OUTPATIENT
Start: 2024-04-29 | End: 2025-04-29

## 2024-04-29 NOTE — PROGRESS NOTES
[facility]  Occupational Therapy Treatment Note     Date: 4/26/2024  [id number]    Therapy Diagnosis:   Encounter Diagnoses   Name Primary?    Pain Yes    Decreased range of motion     Decreased muscle strength     Decreased activities of daily living (ADL)      Physician: Jeannie Cárdenas MD    Physician Orders: Occupational Therapy to Evaluate and Treat  Medical Diagnosis:   S53.439A (ICD-10-CM) - Sprain of lateral collateral ligament of elbow   S56.519A (ICD-10-CM) - Partial tear of common extensor tendon of elbow      Surgical Procedure and Date: N/A     Evaluation Date: 4/12/2024  Insurance Authorization Period Expiration: 4/5/2024 - 4/5/2025  Plan of Care Certification Period: 4/12/2024 - 7/12/2024  Progress Note Due: 5/12/2024      Date of Return to MD: N/A  Visit # / Visits authorized: 2/25  FOTO: 1/3     Precautions:  Standard     Time In: 11:40  Time Out: 12:25  Total Treatment Time: 45 minutes  Total Billable Time: 45 minutes    Subjective     Patient reports: She is doing well. She continues to have pain in her elbow    she was compliant with home exercise program given last session.   Response to previous treatment: Tolerated well  Functional change: Improved swelling    Pain: 5/10  Location: right elbow    Objective     Objective measures updated at progress report unless specified.    Treatment     Supervised Modalities: Modalities such as hot/cold packs, traction, unattended electrical stimulation, paraffin bath, fluidotherapy to reduce pain and increase soft tissue extensibility. Mable received (0) minutes of modalities listed below after being cleared for contraindications.  x = modalities performed     Supervised Modalities 4/26/2024                          Direct Contact Modalities: Modalities such as attended electrical stimulation, iontophoresis, ultrasound to reduce pain and increase soft tissue extensibility. Mable received  (10) minutes of modalities listed below after being cleared for contraindications. x = modalities performed     Direct Contact Modalities 4/26/2024    0.8 MHz, 1.0 W/cm2, 50% to right elbow, to decrease pain & edema, increase circulation and tissue extensibility.  x 10 minutes                       Manual Therapy Techniques: Joint mobilizations, Soft tissue Mobilization, and mobilization with movement applied to the area listed below. Mable received (25) minutes of interventions. x = intervention performed today    Manual Intervention 4/26/2024    Extensive Soft Tissue Mobilization, Myofacial Release, Manual Lymphatic Drainage, IASTM, Cupping.  x Right elbow  to increase blood flow/circulation, improve soft tissue pliability and decrease pain   Joint Mobilizations     Mobilization with movement     Scar Management  Provided silicone scar gel sheet for scar management due to raised, hypertrophic scarring on .   Patient verbalized understanding of instructions provided on wear schedule, care and precautions to monitor.   Patient was also provided with a sleeve of Tubi  size  to wear at night       Therapeutic Exercises: to develop strength, endurance, ROM, flexibility, posture and core stabilization. Mable received (10) minutes of exercises listed below. x = performed today * = level of progression of activity     Therapeutic Exercise 4/26/2024    Forearm stretch 3 ways elbow bent x 3x - 15 second holds   Forearm stretch 3 ways elbow extended x 3x - 15 second holds   Bicep curls 3 ways un weighted x 1 set of 10             Therapeutic Activities: Everyday tasks to address the combined need of improved strength, range of motion, and endurance to achieve increased independence. While simulating these activities, the person is applying the gained skills of strength and improved range of motion in a practical way.  Mable received (0) minutes of activities listed below. x = activities performed today * = level of  progression of activity     Therapeutic Activities 4/26/2024                          Neuromuscular Re-education: the use of functional strengthening, stretching, balancing and coordination activities that train the nerves and muscles to react and communicate to restore normal body movement patterns to increase self care independence. Mable received (0) minutes of interventions listed below.  x = interventions performed today * = level of progression of activity     Neuromuscular Re-education 4/26/2024                          Self Care: Fundamental skills required to independently care for oneself. Activities of daily living such as eating, bathing, dressing, toileting, grooming, sleeping, transfers and mobility. Instrumental activities of daily living such as driving, medication management, pet care, home management/cleaning, financial management, using the phone, meal preparation and shopping. Mable received (0) minutes of interventions listed below.  x = interventions performed * = level of progression of activity     Self Care 4/26/2024                            Home Exercises and Education Provided     Education provided:   - Ergonomics  - progress towards goals     Written Home Exercises Provided: Patient instructed to cont prior HEP.  Exercises were reviewed and Mable was able to demonstrate them prior to the end of the session. Mable demonstrated good understanding of the home exercise program provided.     See EMR under Patient Instructions for exercises provided prior visit.        Assessment     Patient tolerated manual therapy well. She demonstrates decreased swelling in elbow and decreased pain      Mable is progressing towards her goals and there are no updates to goals at this time. Patient prognosis is Good.     Patient will continue to benefit from skilled outpatient occupational therapy to address the deficits listed in the problem list on initial evaluation provide patient/family education  and to maximize patient's level of independence in the home and community environment.     Patient's spiritual, cultural and educational needs considered and patient agreeable to plan of care and goals.    Anticipated barriers to occupational therapy: Pain, Overuse     Goals:     Short Term Goals:  6 weeks  Progress       Pain: Patient will demonstrate improved pain by reports of less than or equal to 3/10 worst pain on the verbal rating scale in order to progress toward maximal functional ability and improve quality of life. PC   Function: Patient will demonstrate improved function as indicated by a functional intake score of more than or equal to 45 out of 100 on FOTO. PC   Mobility: Patient will improve AROM to 50% of stated goals, listed in objective measures above, in order to progress towards independence with functional activities.  PC   Strength: Patient will improve strength to 50% of stated goals, listed in objective measures above, in order to progress towards independence with functional activities.  PC   HEP: Patient will demonstrate independence with HEP in order to progress toward functional independence. PC      Long Term Goals:  12 weeks  Progress      Pain: Patient will demonstrate improved pain by reports of less than or equal to 2/10 worst pain on the verbal rating scale in order to progress toward maximal functional ability and improve quality of life.   PC   Function: Patient will demonstrate improved function as indicated by a functional intake score of more than or equal to 69 out of 100 on FOTO. PC   Mobility: Patient will improve AROM to stated goals, listed in objective measures above, in order to return to maximal functional potential and improve quality of life. PC   Strength: Patient will improve strength to stated goals, listed in objective measures above, in order to improve functional independence and quality of life. PC   Patient will return to normal ADL's, IADL's, community  involvement, recreational activities, and work-related activities with less than or equal to 4/10 pain and maximal function.  PC      Goals Key:  PC= Progressing/Continue;       PM= Partially Met;       Goal Met= Goal Met      DC= Discontinue    Plan     Continue Plan of Care (POC) and progress per patient tolerance.      Kera Adorno, OT  ,OTD, OTR/L

## 2024-05-03 ENCOUNTER — CLINICAL SUPPORT (OUTPATIENT)
Dept: REHABILITATION | Facility: HOSPITAL | Age: 54
End: 2024-05-03
Payer: COMMERCIAL

## 2024-05-03 DIAGNOSIS — M62.81 DECREASED MUSCLE STRENGTH: ICD-10-CM

## 2024-05-03 DIAGNOSIS — R52 PAIN: Primary | ICD-10-CM

## 2024-05-03 DIAGNOSIS — Z78.9 DECREASED ACTIVITIES OF DAILY LIVING (ADL): ICD-10-CM

## 2024-05-03 DIAGNOSIS — M25.60 DECREASED RANGE OF MOTION: ICD-10-CM

## 2024-05-03 PROCEDURE — 97110 THERAPEUTIC EXERCISES: CPT

## 2024-05-03 PROCEDURE — 97035 APP MDLTY 1+ULTRASOUND EA 15: CPT

## 2024-05-03 PROCEDURE — 97140 MANUAL THERAPY 1/> REGIONS: CPT

## 2024-05-06 ENCOUNTER — OFFICE VISIT (OUTPATIENT)
Dept: SPORTS MEDICINE | Facility: CLINIC | Age: 54
End: 2024-05-06
Payer: COMMERCIAL

## 2024-05-06 DIAGNOSIS — M25.562 CHRONIC PAIN OF LEFT KNEE: ICD-10-CM

## 2024-05-06 DIAGNOSIS — M67.951 TENDINOPATHY OF RIGHT GLUTEAL REGION: ICD-10-CM

## 2024-05-06 DIAGNOSIS — M25.551 RIGHT HIP PAIN: ICD-10-CM

## 2024-05-06 DIAGNOSIS — M25.551 GREATER TROCHANTERIC PAIN SYNDROME OF RIGHT LOWER EXTREMITY: ICD-10-CM

## 2024-05-06 DIAGNOSIS — G89.29 CHRONIC PAIN OF LEFT KNEE: ICD-10-CM

## 2024-05-06 DIAGNOSIS — M17.12 PRIMARY OSTEOARTHRITIS OF LEFT KNEE: Primary | ICD-10-CM

## 2024-05-06 PROCEDURE — 99214 OFFICE O/P EST MOD 30 MIN: CPT | Mod: S$GLB,,, | Performed by: STUDENT IN AN ORGANIZED HEALTH CARE EDUCATION/TRAINING PROGRAM

## 2024-05-06 PROCEDURE — 99999 PR PBB SHADOW E&M-EST. PATIENT-LVL IV: CPT | Mod: PBBFAC,,, | Performed by: STUDENT IN AN ORGANIZED HEALTH CARE EDUCATION/TRAINING PROGRAM

## 2024-05-06 RX ORDER — DICLOFENAC SODIUM 75 MG/1
75 TABLET, DELAYED RELEASE ORAL 2 TIMES DAILY PRN
Qty: 60 TABLET | Refills: 2 | Status: SHIPPED | OUTPATIENT
Start: 2024-05-06

## 2024-05-06 NOTE — PATIENT INSTRUCTIONS
Assessment:  Mable Redding is a 53 y.o. female with a chief complaint of Pain of the Right Knee and Pain of the Right Hip    Encounter Diagnoses   Name Primary?    Primary osteoarthritis of left knee Yes    Chronic pain of left knee     Right hip pain     Greater trochanteric pain syndrome of right lower extremity     Tendinopathy of right gluteal region       Plan:  Left knee pain, still with some pain, as well as some clicking, mostly around the anterolateral joint line.  Suspect underlying degenerative anterior meniscal tear.  Now about 4 weeks out from Durolane injection for the left knee.  We will continue to monitor response with Visco.    Can consider for repeat corticosteroid injection.  No current indication for arthroscopy.    We will refer for physical therapy at [facility].    If not improving, may consider for MRI left knee  Persistent right lateral hip pain, greater trochanteric pain.  Only about 1 week of relief with greater trochanteric bursa corticosteroid injection.    Would not recommend for repeat corticosteroid injection.    Recommend aggressive physical therapy for the gluteus medius/minimus, as MRI did show some tearing and some tendinosis at that area.    We will refer for physical therapy at [facility].    If not improving despite aggressive therapy, may be a candidate tenotomy with TenJet.    Follow-up:  3 months or sooner if there are any problems between now and then.    Thank you for choosing [facility] and Dr. Daniel Srinivasan for your orthopedic & sports medicine care. It is our goal to provide you with exceptional care that will help keep you healthy, active, and get you back in the game.    Please do not hesitate to reach out to us via email, phone, or MyChart with any questions, concerns, or feedback.    If you are experiencing pain/discomfort ,or have questions after 5pm and would like to be connected to the [facility] on-call team, please call this number and specify which Sports Medicine provider is treating you: [phone]

## 2024-05-06 NOTE — PROGRESS NOTES
[facility]  Occupational Therapy Treatment Note     Date: 5/3/2024  [id number]    Therapy Diagnosis:   Encounter Diagnoses   Name Primary?    Pain Yes    Decreased range of motion     Decreased muscle strength     Decreased activities of daily living (ADL)      Physician: Jeannie Cárdenas MD    Physician Orders: Occupational Therapy to Evaluate and Treat  Medical Diagnosis:   S53.439A (ICD-10-CM) - Sprain of lateral collateral ligament of elbow   S56.519A (ICD-10-CM) - Partial tear of common extensor tendon of elbow      Surgical Procedure and Date: N/A     Evaluation Date: 4/12/2024  Insurance Authorization Period Expiration: 4/5/2024 - 4/5/2025  Plan of Care Certification Period: 4/12/2024 - 7/12/2024  Progress Note Due: 5/12/2024      Date of Return to MD: N/A  Visit # / Visits authorized: 3/25  FOTO: 1/3     Precautions:  Standard     Time In: 10:50  Time Out: 11:30  Total Treatment Time: 40 minutes  Total Billable Time: 40 minutes    Subjective     Patient reports: She is doing well. She continues to have pain in her elbow    she was compliant with home exercise program given last session.   Response to previous treatment: Tolerated well  Functional change: Improved swelling    Pain: 5/10  Location: right elbow    Objective     Objective measures updated at progress report unless specified.    Treatment     Supervised Modalities: Modalities such as hot/cold packs, traction, unattended electrical stimulation, paraffin bath, fluidotherapy to reduce pain and increase soft tissue extensibility. Mable received (0) minutes of modalities listed below after being cleared for contraindications.  x = modalities performed     Supervised Modalities 5/3/2024                          Direct Contact Modalities: Modalities such as attended electrical stimulation, iontophoresis, ultrasound to reduce pain and increase soft tissue extensibility. Mable received  (10) minutes of modalities listed below after being cleared for contraindications. x = modalities performed     Direct Contact Modalities 5/3/2024    0.8 MHz, 1.0 W/cm2, 100% to right elbow, to decrease pain & edema, increase circulation and tissue extensibility.  x 10 minutes                       Manual Therapy Techniques: Joint mobilizations, Soft tissue Mobilization, and mobilization with movement applied to the area listed below. Mable received (20) minutes of interventions. x = intervention performed today    Manual Intervention 5/3/2024    Extensive Soft Tissue Mobilization, Myofacial Release, Manual Lymphatic Drainage, IASTM, Cupping.  x Right elbow  to increase blood flow/circulation, improve soft tissue pliability and decrease pain   Joint Mobilizations     Mobilization with movement     Scar Management  Provided silicone scar gel sheet for scar management due to raised, hypertrophic scarring on .   Patient verbalized understanding of instructions provided on wear schedule, care and precautions to monitor.   Patient was also provided with a sleeve of Tubi  size  to wear at night       Therapeutic Exercises: to develop strength, endurance, ROM, flexibility, posture and core stabilization. Mable received (10) minutes of exercises listed below. x = performed today * = level of progression of activity     Therapeutic Exercise 5/3/2024    Forearm stretch 3 ways elbow bent x 3x - 15 second holds   Forearm stretch 3 ways elbow extended x 3x - 15 second holds   Bicep curls 3 ways un weighted x 1 set of 10             Therapeutic Activities: Everyday tasks to address the combined need of improved strength, range of motion, and endurance to achieve increased independence. While simulating these activities, the person is applying the gained skills of strength and improved range of motion in a practical way.  Mable received (0) minutes of activities listed below. x = activities performed today * = level of  progression of activity     Therapeutic Activities 5/3/2024                          Neuromuscular Re-education: the use of functional strengthening, stretching, balancing and coordination activities that train the nerves and muscles to react and communicate to restore normal body movement patterns to increase self care independence. Mable received (0) minutes of interventions listed below.  x = interventions performed today * = level of progression of activity     Neuromuscular Re-education 5/3/2024                          Self Care: Fundamental skills required to independently care for oneself. Activities of daily living such as eating, bathing, dressing, toileting, grooming, sleeping, transfers and mobility. Instrumental activities of daily living such as driving, medication management, pet care, home management/cleaning, financial management, using the phone, meal preparation and shopping. Mable received (0) minutes of interventions listed below.  x = interventions performed * = level of progression of activity     Self Care 5/3/2024                            Home Exercises and Education Provided     Education provided:   - Ergonomics  - progress towards goals     Written Home Exercises Provided: Patient instructed to cont prior HEP.  Exercises were reviewed and Mable was able to demonstrate them prior to the end of the session. Mable demonstrated good understanding of the home exercise program provided.     See EMR under Patient Instructions for exercises provided prior visit.        Assessment     Patient tolerated manual therapy well. She demonstrates decreased swelling in elbow and decreased pain      Mable is progressing towards her goals and there are no updates to goals at this time. Patient prognosis is Good.     Patient will continue to benefit from skilled outpatient occupational therapy to address the deficits listed in the problem list on initial evaluation provide patient/family education and  to maximize patient's level of independence in the home and community environment.     Patient's spiritual, cultural and educational needs considered and patient agreeable to plan of care and goals.    Anticipated barriers to occupational therapy: Pain, Overuse     Goals:     Short Term Goals:  6 weeks  Progress       Pain: Patient will demonstrate improved pain by reports of less than or equal to 3/10 worst pain on the verbal rating scale in order to progress toward maximal functional ability and improve quality of life. PC   Function: Patient will demonstrate improved function as indicated by a functional intake score of more than or equal to 45 out of 100 on FOTO. PC   Mobility: Patient will improve AROM to 50% of stated goals, listed in objective measures above, in order to progress towards independence with functional activities.  PC   Strength: Patient will improve strength to 50% of stated goals, listed in objective measures above, in order to progress towards independence with functional activities.  PC   HEP: Patient will demonstrate independence with HEP in order to progress toward functional independence. PC      Long Term Goals:  12 weeks  Progress      Pain: Patient will demonstrate improved pain by reports of less than or equal to 2/10 worst pain on the verbal rating scale in order to progress toward maximal functional ability and improve quality of life.   PC   Function: Patient will demonstrate improved function as indicated by a functional intake score of more than or equal to 69 out of 100 on FOTO. PC   Mobility: Patient will improve AROM to stated goals, listed in objective measures above, in order to return to maximal functional potential and improve quality of life. PC   Strength: Patient will improve strength to stated goals, listed in objective measures above, in order to improve functional independence and quality of life. PC   Patient will return to normal ADL's, IADL's, community  involvement, recreational activities, and work-related activities with less than or equal to 4/10 pain and maximal function.  PC      Goals Key:  PC= Progressing/Continue;       PM= Partially Met;       Goal Met= Goal Met      DC= Discontinue    Plan     Continue Plan of Care (POC) and progress per patient tolerance.      Kera Adorno, OT  ,OTD, OTR/L

## 2024-05-06 NOTE — PROGRESS NOTES
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 female who presents today with Pain of the Right Knee and Pain of the Right Hip    Left Knee:  She complains of chronic left knee pain that has been under the care of Dr. Rudy Depmsey.  She underwent surgery for medial meniscectomy in 2022 and did well initially.  She planned viscosupplementation injections in the past but never completed them.  She had been doing well until her left knee flared up during a pickleball match on 2/1/24.  She pivoted and has felt deep pain in the anterior, medial and lateral knee that has not responded to conservative management.  Her last treatments included a cortisone injection in Feb 2024 and subsequent Durolane injection in April 2024.  She reports approximately 1 month of relief following the Durolane.  Her knee has started to develop worsening pain and crepitus.  No new injuries.    Right Hip:  She complains of bilateral hip pain R>L that has been present for many years, worsening since summer 2023 without any new injuries.    She has just been dealing with the pain for many years.  A recent MRI demonstrated tendinopathy and partial thickness tearing of the lateral gluteal tendons.  She received a GT cortisone injection in March 2024 which helped tremendously but only lasted for 6 weeks.    Past Medical History:   Past Medical History:   Diagnosis Date    Anxiety     Bursitis     Right hip    COVID-19 11/3/2021    Depression     Digestive disorder     Herpes genitalia     History of blood clots     HPV (human papilloma virus) infection     HTN (hypertension) 5/17/2022    Lupus anticoagulant disorder     Lupus anticoagulant disorder 1/30/2019    Pulmonary embolus     Thyroid disease       Past Surgical History:   Procedure Laterality Date    ADENOIDECTOMY      ARTHROSCOPIC CHONDROPLASTY OF KNEE JOINT Left 2022    Procedure: ARTHROSCOPY, KNEE, WITH CHONDROPLASTY;  Surgeon: Rudy Dempsey MD;  Location: [facility];  Service: Orthopedics;  Laterality: Left;  Left knee chondroplasty of the left knee medial femoral condyle and medial tibial plateau    ARTHROSCOPY OF KNEE Left 2022    Procedure: ARTHROSCOPY, KNEE;  Surgeon: Rudy Dempsey MD;  Location: [facility];  Service: Orthopedics;  Laterality: Left;     SECTION      DILATION AND CURETTAGE OF UTERUS      HYSTERECTOMY  2017    HYSTEROSCOPY      KNEE ARTHROSCOPY W/ MENISCECTOMY Left 2022    Procedure: ARTHROSCOPY, KNEE, WITH MENISCECTOMY;  Surgeon: Rudy Dempsey MD;  Location: [facility];  Service: Orthopedics;  Laterality: Left;  partial medial meniscectomy (approximately 60-70% left of the posterior horn) and lateral meniscectomy (small flap on anterior horn    LIPOMA RESECTION      OTHER SURGICAL HISTORY      Patient states she feel SOB in recovery and BP drops. Requests to be placed on side or sitting up.    ROBOTIC HYSTERECTOMY, WITH SALPINGECTOMY Bilateral     TONSILLECTOMY       Family History   Problem Relation Name Age of Onset    Hyperlipidemia Mother      Coronary artery disease Father      Hyperlipidemia Father      Hypertension Father      No Known Problems Sister      No Known Problems Maternal Aunt      No Known Problems Maternal Uncle      No Known Problems Paternal Aunt      No Known Problems Paternal Uncle      No Known Problems Maternal Grandmother      Diabetes type II Maternal Grandfather      Hypertension Paternal Grandmother      Hyperlipidemia Paternal Grandmother      Heart disease Paternal Grandmother      Lung cancer Paternal Grandmother      Lung cancer Paternal Grandfather      Amblyopia Neg Hx      Blindness Neg Hx      Cancer Neg Hx      Cataracts Neg Hx      Diabetes Neg Hx      Glaucoma  Neg Hx      Macular degeneration Neg Hx      Retinal detachment Neg Hx      Strabismus Neg Hx      Stroke Neg Hx      Thyroid disease Neg Hx       Social History     Socioeconomic History    Marital status:    Tobacco Use    Smoking status: Former     Current packs/day: 0.00     Average packs/day: 0.3 packs/day for 10.0 years (2.5 ttl pk-yrs)     Types: Cigarettes     Start date:      Quit date: 2000     Years since quittin.3     Passive exposure: Never    Smokeless tobacco: Never   Substance and Sexual Activity    Alcohol use: Yes     Comment: Socially    Drug use: No    Sexual activity: Yes     Partners: Male     Birth control/protection: See Surgical Hx     Medication List with Changes/Refills   New Medications    DICLOFENAC (VOLTAREN) 75 MG EC TABLET    Take 1 tablet (75 mg total) by mouth 2 (two) times daily as needed (joint pain).   Current Medications    ALPRAZOLAM (XANAX) 0.25 MG TABLET    Take 0.25 mg by mouth nightly as needed.    AMOXICILLIN-CLAVULANATE 875-125MG (AUGMENTIN) 875-125 MG PER TABLET    Take 1 tablet by mouth every 12 (twelve) hours.    CLONAZEPAM (KLONOPIN) 1 MG TABLET    Take 1 tablet (1 mg total) by mouth every evening.    DOXYCYCLINE (ORACEA) 40 MG CAPSULE    Take 1 capsule (40 mg total) by mouth once daily.    EZETIMIBE (ZETIA) 10 MG TABLET    Take 1 tablet (10 mg total) by mouth once daily.    FINASTERIDE (PROSCAR) 5 MG TABLET    Take 1 tablet by mouth daily    FLUCONAZOLE (DIFLUCAN) 150 MG TAB    Take 1 tablet (150 mg total) by mouth once daily.    IVERMECTIN (SOOLANTRA) 1 % CREA        LEVOTHYROXINE (SYNTHROID) 100 MCG TABLET    Take 1 tablet (100 mcg total) by mouth before breakfast.    LIOTHYRONINE (CYTOMEL) 5 MCG TAB    Take 2 tablets (10 mcg total) by mouth once daily.    LISDEXAMFETAMINE (VYVANSE) 30 MG CAPSULE    Take 1 capsule (30 mg total) by mouth every morning.    LISDEXAMFETAMINE (VYVANSE) 30 MG CAPSULE    Take 1 capsule (30 mg total) by mouth every morning.     LISDEXAMFETAMINE (VYVANSE) 30 MG CAPSULE    Take 1 capsule (30 mg total) by mouth every morning.    LORATADINE (CLARITIN) 10 MG TABLET    TAKE 1 TABLET BY MOUTH EVERY DAY AS NEEDED FOR ALLERGY    LOSARTAN (COZAAR) 100 MG TABLET    Take 1 tablet (100 mg total) by mouth every evening.    METOPROLOL SUCCINATE (TOPROL-XL) 25 MG 24 HR TABLET    Take 1 tablet (25 mg total) by mouth once daily.    MINOXIDIL (LONITEN) 2.5 MG TABLET    Take 0.5 tablets (1.25 mg total) by mouth once daily.    MINOXIDIL (LONITEN) 2.5 MG TABLET    Take 0.5/half tablets by mouth daily    MUPIROCIN (BACTROBAN) 2 % OINTMENT    Apply to nostrils 2 (two) times daily.    NITROFURANTOIN, MACROCRYSTAL-MONOHYDRATE, (MACROBID) 100 MG CAPSULE    Take 1 capsule (100 mg total) by mouth 2 (two) times daily    PANTOPRAZOLE (PROTONIX) 40 MG TABLET    TAKE 1 TABLET BY MOUTH EVERY DAY IN THE EVENING    PHENTERMINE (LOMAIRA) 8 MG TAB    Take 1 tablet by mouth 2 (two) times daily as needed (appetite).    ROSUVASTATIN (CRESTOR) 40 MG TAB    Take 1 tablet (40 mg total) by mouth every evening.    VENLAFAXINE (EFFEXOR-XR) 150 MG CP24    Take 1 capsule (150 mg total) by mouth once daily.     Review of patient's allergies indicates:   Allergen Reactions    Aldactone [spironolactone] Swelling    Remdesivir Hives    Sulfa (sulfonamide antibiotics) Edema    Trimethoprim      Other Reaction(s): SWELLING OF MUCUS MEMBRANES       Physical Exam:   There is no height or weight on file to calculate BMI.    Physical Exam  Detailed MSK exam:   Ortho/SPM Exam     Imaging:  No new imaging to review       Patient Instructions   Assessment:  Mable Redding is a 53 y.o. female with a chief complaint of Pain of the Right Knee and Pain of the Right Hip    Encounter Diagnoses   Name Primary?    Primary osteoarthritis of left knee Yes    Chronic pain of left knee     Right hip pain     Greater trochanteric pain syndrome of right lower extremity     Tendinopathy of right gluteal  region       Plan:  Left knee pain, still with some pain, as well as some clicking, mostly around the anterolateral joint line.  Suspect underlying degenerative anterior meniscal tear.  Now about 4 weeks out from Durolane injection for the left knee.  We will continue to monitor response with Visco.    Can consider for repeat corticosteroid injection.  No current indication for arthroscopy.    We will refer for physical therapy at [facility].    If not improving, may consider for MRI left knee  Persistent right lateral hip pain, greater trochanteric pain.  Only about 1 week of relief with greater trochanteric bursa corticosteroid injection.    Would not recommend for repeat corticosteroid injection.    Recommend aggressive physical therapy for the gluteus medius/minimus, as MRI did show some tearing and some tendinosis at that area.    We will refer for physical therapy at [facility].    If not improving despite aggressive therapy, may be a candidate tenotomy with TenJet.    Follow-up:  3 months or sooner if there are any problems between now and then.    Thank you for choosing [facility] and Dr. Daniel Srinivasan for your orthopedic & sports medicine care. It is our goal to provide you with exceptional care that will help keep you healthy, active, and get you back in the game.    Please do not hesitate to reach out to us via email, phone, or MyChart with any questions, concerns, or feedback.    If you are experiencing pain/discomfort ,or have questions after 5pm and would like to be connected to the [facility] on-call team, please call this number and specify which Sports Medicine provider is treating you: [phone]         A copy of today's visit note has been sent to the referring provider.           Daniel Srinivasan MD  Primary Care Sports Medicine    Disclaimer: This note was prepared using a voice recognition system and is likely to have sound  alike errors within the text.   Delete the

## 2024-05-10 ENCOUNTER — CLINICAL SUPPORT (OUTPATIENT)
Dept: REHABILITATION | Facility: HOSPITAL | Age: 54
End: 2024-05-10
Payer: COMMERCIAL

## 2024-05-10 DIAGNOSIS — Z78.9 DECREASED ACTIVITIES OF DAILY LIVING (ADL): ICD-10-CM

## 2024-05-10 DIAGNOSIS — R52 PAIN: Primary | ICD-10-CM

## 2024-05-10 DIAGNOSIS — M25.60 DECREASED RANGE OF MOTION: ICD-10-CM

## 2024-05-10 DIAGNOSIS — M62.81 DECREASED MUSCLE STRENGTH: ICD-10-CM

## 2024-05-10 PROCEDURE — 97140 MANUAL THERAPY 1/> REGIONS: CPT

## 2024-05-10 PROCEDURE — 97035 APP MDLTY 1+ULTRASOUND EA 15: CPT

## 2024-05-10 PROCEDURE — 97110 THERAPEUTIC EXERCISES: CPT

## 2024-05-10 NOTE — PATIENT INSTRUCTIONS
[facility] - OCCUPATIONAL THERAPY  HOME EXERCISE PROGRAM     Perform the following stretches 3-4 times per day. Do 3-5 repetitions each stretch and hold each stretch for 10-15 seconds.

## 2024-05-10 NOTE — PROGRESS NOTES
[facility]  Occupational Therapy Treatment Note     Date: 5/10/2024  [id number]    Therapy Diagnosis:   Encounter Diagnoses   Name Primary?    Pain Yes    Decreased range of motion     Decreased muscle strength     Decreased activities of daily living (ADL)      Physician: Jeannie Cárdenas MD    Physician Orders: Occupational Therapy to Evaluate and Treat  Medical Diagnosis:   S53.439A (ICD-10-CM) - Sprain of lateral collateral ligament of elbow   S56.519A (ICD-10-CM) - Partial tear of common extensor tendon of elbow      Surgical Procedure and Date: N/A     Evaluation Date: 4/12/2024  Insurance Authorization Period Expiration: 4/5/2024 - 4/5/2025  Plan of Care Certification Period: 4/12/2024 - 7/12/2024  Progress Note Due: 5/12/2024      Date of Return to MD: N/A  Visit # / Visits authorized: 3 / 25  FOTO: 1/3     Precautions:  Standard     Time In: 10:45  Time Out: 11:45  Total Treatment Time: 60 minutes  Total Billable Time: 60 minutes    Subjective     Patient reports: her pain continues to decrease, but she continues to have numbness in her hand in the radial nerve distribution..      she was compliant with home exercise program given last session.   Response to previous treatment: Tolerated well  Functional change: Improved swelling    Pain: 3/10  Location: right elbow    Objective     Objective measures updated at progress report unless specified.    Treatment     Supervised Modalities: Modalities such as hot/cold packs, traction, unattended electrical stimulation, paraffin bath, fluidotherapy to reduce pain and increase soft tissue extensibility. Mable received (0) minutes of modalities listed below after being cleared for contraindications.  x = modalities performed     Supervised Modalities 5/10/2024                          Direct Contact Modalities: Modalities such as attended electrical stimulation, iontophoresis, ultrasound to reduce  "pain and increase soft tissue extensibility. Mable received (10) minutes of modalities listed below after being cleared for contraindications. x = modalities performed     Direct Contact Modalities 5/10/2024    0.8 MHz, 1.0 W/cm2, 100% to right elbow, to decrease pain & edema, increase circulation and tissue extensibility.  x 10 minutes        Add Next Visit:     Ultrasound to R upper arm muscle spasm  1.0 MHz, .4 W/CM2, 50% pulsed, 5 min        Manual Therapy Techniques: Joint mobilizations, Soft tissue Mobilization, and mobilization with movement applied to the area listed below. Mable received (25) minutes of interventions. x = intervention performed today    Manual Intervention 5/10/2024    Extensive Soft Tissue Mobilization, Myofacial Release, Manual Lymphatic Drainage, IASTM, Cupping.  x Right elbow  to increase blood flow/circulation, improve soft tissue pliability and decrease pain   Joint Mobilizations     Kinesiotaping x "Tennis elbow" taping applied to R elbow and "jellyfish" taping pattern applied to R upper arm for pain and soft tissue management. Patient instructed on purpose, wear, care, precautions to monitor and removal of KT. Patient verbalized understanding of all instructions provided.     Mobilization with movement     Scar Management  Provided silicone scar gel sheet for scar management due to raised, hypertrophic scarring on .   Patient verbalized understanding of instructions provided on wear schedule, care and precautions to monitor.   Patient was also provided with a sleeve of Tubi  size  to wear at night       Therapeutic Exercises: to develop strength, endurance, ROM, flexibility, posture and core stabilization. Mable received (25) minutes of exercises listed below. x = performed today * = level of progression of activity     Therapeutic Exercise 5/10/2024    Forearm stretch 3 ways elbow bent x 3x - 15 second hold   Forearm stretch 3 ways elbow extended x 3x - 15 second hold "   Forearm stretch, elbow extended, thumb down x 3 reps, 15 second hold   Bicep curls 3 ways un weighted x 1 set of 10   UE stretches:     -Posterior capsule stretch x 3 reps, 15 sec hold each   -Triceps stretch x 3 reps, 15 sec hold each   -biceps stretch hand 2 ways x 3 reps, 15 sec hold each   's stretch w/ therapist assist x 3 reps, 15 sec hold each        Therapeutic Activities: Everyday tasks to address the combined need of improved strength, range of motion, and endurance to achieve increased independence. While simulating these activities, the person is applying the gained skills of strength and improved range of motion in a practical way.  Mable received (0) minutes of activities listed below. x = activities performed today * = level of progression of activity     Therapeutic Activities 5/10/2024                          Neuromuscular Re-education: the use of functional strengthening, stretching, balancing and coordination activities that train the nerves and muscles to react and communicate to restore normal body movement patterns to increase self care independence. Mable received (0) minutes of interventions listed below.  x = interventions performed today * = level of progression of activity     Neuromuscular Re-education 5/10/2024                          Self Care: Fundamental skills required to independently care for oneself. Activities of daily living such as eating, bathing, dressing, toileting, grooming, sleeping, transfers and mobility. Instrumental activities of daily living such as driving, medication management, pet care, home management/cleaning, financial management, using the phone, meal preparation and shopping. Mable received (0) minutes of interventions listed below.  x = interventions performed * = level of progression of activity     Self Care 5/10/2024                            Home Exercises and Education Provided     Education provided:   - Added UE stretches, see Patient  Instructions for details  - Ergonomics  - progress towards goals     Written Home Exercises Provided: Patient instructed to cont prior HEP.  Exercises were reviewed and Mable was able to demonstrate them prior to the end of the session. Mable demonstrated good understanding of the home exercise program provided.     See EMR under Patient Instructions for exercises provided prior visit.        Assessment     Patient was noted to have a large muscle spasm in her R upper arm, which is contributing to her overall pain in her R elbow.  Soft tissue improvement was noted after manual therapy today, however, spasm did not completely resolve.  Further intervention is needed for upper arm and radial nerve entrapment symptoms at next visit.      Mable is progressing towards her goals and there are no updates to goals at this time. Patient prognosis is Good.     Patient will continue to benefit from skilled outpatient occupational therapy to address the deficits listed in the problem list on initial evaluation provide patient/family education and to maximize patient's level of independence in the home and community environment.     Patient's spiritual, cultural and educational needs considered and patient agreeable to plan of care and goals.    Anticipated barriers to occupational therapy: Pain, Overuse     Goals:     Short Term Goals:  6 weeks  Progress       Pain: Patient will demonstrate improved pain by reports of less than or equal to 3/10 worst pain on the verbal rating scale in order to progress toward maximal functional ability and improve quality of life. PC   Function: Patient will demonstrate improved function as indicated by a functional intake score of more than or equal to 45 out of 100 on FOTO. PC   Mobility: Patient will improve AROM to 50% of stated goals, listed in objective measures above, in order to progress towards independence with functional activities.  PC   Strength: Patient will improve strength to  50% of stated goals, listed in objective measures above, in order to progress towards independence with functional activities.  PC   HEP: Patient will demonstrate independence with HEP in order to progress toward functional independence. PC      Long Term Goals:  12 weeks  Progress      Pain: Patient will demonstrate improved pain by reports of less than or equal to 2/10 worst pain on the verbal rating scale in order to progress toward maximal functional ability and improve quality of life.   PC   Function: Patient will demonstrate improved function as indicated by a functional intake score of more than or equal to 69 out of 100 on FOTO. PC   Mobility: Patient will improve AROM to stated goals, listed in objective measures above, in order to return to maximal functional potential and improve quality of life. PC   Strength: Patient will improve strength to stated goals, listed in objective measures above, in order to improve functional independence and quality of life. PC   Patient will return to normal ADL's, IADL's, community involvement, recreational activities, and work-related activities with less than or equal to 4/10 pain and maximal function.  PC      Goals Key:  PC= Progressing/Continue;       PM= Partially Met;       Goal Met= Goal Met      DC= Discontinue    Plan     Continue Plan of Care (POC) and progress per patient tolerance.      Bereket Sterling, OT  ,OTD, OTR/L

## 2024-05-17 ENCOUNTER — CLINICAL SUPPORT (OUTPATIENT)
Dept: REHABILITATION | Facility: HOSPITAL | Age: 54
End: 2024-05-17
Payer: COMMERCIAL

## 2024-05-17 ENCOUNTER — CLINICAL SUPPORT (OUTPATIENT)
Dept: REHABILITATION | Facility: HOSPITAL | Age: 54
End: 2024-05-17
Attending: STUDENT IN AN ORGANIZED HEALTH CARE EDUCATION/TRAINING PROGRAM
Payer: COMMERCIAL

## 2024-05-17 DIAGNOSIS — M67.951 TENDINOPATHY OF RIGHT GLUTEAL REGION: ICD-10-CM

## 2024-05-17 DIAGNOSIS — M25.551 RIGHT HIP PAIN: ICD-10-CM

## 2024-05-17 DIAGNOSIS — M25.651 DECREASED RANGE OF MOTION OF BOTH HIPS: Primary | ICD-10-CM

## 2024-05-17 DIAGNOSIS — R29.898 DECREASED STRENGTH OF LOWER EXTREMITY: ICD-10-CM

## 2024-05-17 DIAGNOSIS — R52 PAIN: Primary | ICD-10-CM

## 2024-05-17 DIAGNOSIS — M62.81 DECREASED MUSCLE STRENGTH: ICD-10-CM

## 2024-05-17 DIAGNOSIS — Z78.9 DECREASED ACTIVITIES OF DAILY LIVING (ADL): ICD-10-CM

## 2024-05-17 DIAGNOSIS — G89.29 CHRONIC PAIN OF LEFT KNEE: ICD-10-CM

## 2024-05-17 DIAGNOSIS — Z74.09 DECREASED FUNCTIONAL MOBILITY AND ENDURANCE: ICD-10-CM

## 2024-05-17 DIAGNOSIS — M25.652 DECREASED RANGE OF MOTION OF BOTH HIPS: Primary | ICD-10-CM

## 2024-05-17 DIAGNOSIS — M25.60 DECREASED RANGE OF MOTION: ICD-10-CM

## 2024-05-17 DIAGNOSIS — M25.562 CHRONIC PAIN OF LEFT KNEE: ICD-10-CM

## 2024-05-17 PROBLEM — M25.659 DECREASED RANGE OF HIP MOVEMENT: Status: ACTIVE | Noted: 2024-05-17

## 2024-05-17 PROCEDURE — 97110 THERAPEUTIC EXERCISES: CPT

## 2024-05-17 PROCEDURE — 97163 PT EVAL HIGH COMPLEX 45 MIN: CPT

## 2024-05-17 PROCEDURE — 97140 MANUAL THERAPY 1/> REGIONS: CPT

## 2024-05-17 PROCEDURE — 97035 APP MDLTY 1+ULTRASOUND EA 15: CPT

## 2024-05-17 NOTE — PROGRESS NOTES
[facility]  Occupational Therapy Treatment Note     Date: 5/17/2024  [id number]    Therapy Diagnosis:   Encounter Diagnoses   Name Primary?    Pain Yes    Decreased range of motion     Decreased muscle strength     Decreased activities of daily living (ADL)      Physician: Jeannie Cárdenas MD    Physician Orders: Occupational Therapy to Evaluate and Treat  Medical Diagnosis:   S53.439A (ICD-10-CM) - Sprain of lateral collateral ligament of elbow   S56.519A (ICD-10-CM) - Partial tear of common extensor tendon of elbow      Surgical Procedure and Date: N/A     Evaluation Date: 4/12/2024  Insurance Authorization Period Expiration: 4/5/2024 - 4/5/2025  Plan of Care Certification Period: 4/12/2024 - 7/12/2024  Progress Note Due: 5/12/2024      Date of Return to MD: N/A  Visit # / Visits authorized: 5 / 25  FOTO: 1/3     Precautions:  Standard     Time In: 10:35  Time Out: 11:35  Total Treatment Time: 60 minutes  Total Billable Time: 60 minutes    Subjective     Patient reports: pain is about the same in her R upper arm/elbow/forearm, but muscle spasm in upper arm is less tender today      she was compliant with home exercise program given last session.   Response to previous treatment: Tolerated well  Functional change: Improved swelling    Pain: 3/10  Location: right elbow    Objective     Objective measures updated at progress report unless specified.    Treatment     Supervised Modalities: Modalities such as hot/cold packs, traction, unattended electrical stimulation, paraffin bath, fluidotherapy to reduce pain and increase soft tissue extensibility. Mable received (0) minutes of modalities listed below after being cleared for contraindications.  x = modalities performed     Supervised Modalities 5/17/2024                          Direct Contact Modalities: Modalities such as attended electrical stimulation, iontophoresis, ultrasound to reduce pain and  "increase soft tissue extensibility. Mable received (10) minutes of modalities listed below after being cleared for contraindications. x = modalities performed     Direct Contact Modalities 5/17/2024    1.0 MHz, .8 W/cm2, 50% to right upper arm spasm & lat. elbow, to decrease pain & edema, increase circulation and tissue extensibility.  x 12 minutes, time equally split between areas             Manual Therapy Techniques: Joint mobilizations, Soft tissue Mobilization, and mobilization with movement applied to the area listed below. Mable received (28) minutes of interventions. x = intervention performed today    Manual Intervention 5/17/2024    Extensive Soft Tissue Mobilization, Myofacial Release, Manual Lymphatic Drainage, IASTM, Cupping.  x Right elbow  to increase blood flow/circulation, improve soft tissue pliability and decrease pain   Joint Mobilizations     Kinesiotaping x "Tennis elbow" taping applied to R elbow and "jellyfish" taping pattern applied to R upper arm for pain and soft tissue management. Patient instructed on purpose, wear, care, precautions to monitor and removal of KT. Patient verbalized understanding of all instructions provided.     Mobilization with movement     Scar Management  Provided silicone scar gel sheet for scar management due to raised, hypertrophic scarring on .   Patient verbalized understanding of instructions provided on wear schedule, care and precautions to monitor.   Patient was also provided with a sleeve of Tubi  size  to wear at night       Therapeutic Exercises: to develop strength, endurance, ROM, flexibility, posture and core stabilization. Mable received (20) minutes of exercises listed below. x = performed today * = level of progression of activity     Therapeutic Exercise 5/17/2024    Forearm stretch 3 ways elbow bent x 3x - 15 second hold   Forearm stretch 3 ways elbow extended x 3x - 15 second hold   Forearm stretch, elbow extended, thumb down x 3 reps, 15 " second hold   Bicep curls 3 ways un weighted x 1 set of 10   UE stretches:     -Posterior capsule stretch x 3 reps, 15 sec hold each   -Triceps stretch x 3 reps, 15 sec hold each   -biceps stretch hand 2 ways x 3 reps, 15 sec hold each   's stretch w/ therapist assist x 3 reps, 15 sec hold each        Therapeutic Activities: Everyday tasks to address the combined need of improved strength, range of motion, and endurance to achieve increased independence. While simulating these activities, the person is applying the gained skills of strength and improved range of motion in a practical way.  Mable received (0) minutes of activities listed below. x = activities performed today * = level of progression of activity     Therapeutic Activities 5/17/2024                          Neuromuscular Re-education: the use of functional strengthening, stretching, balancing and coordination activities that train the nerves and muscles to react and communicate to restore normal body movement patterns to increase self care independence. Mable received (0) minutes of interventions listed below.  x = interventions performed today * = level of progression of activity     Neuromuscular Re-education 5/17/2024                          Self Care: Fundamental skills required to independently care for oneself. Activities of daily living such as eating, bathing, dressing, toileting, grooming, sleeping, transfers and mobility. Instrumental activities of daily living such as driving, medication management, pet care, home management/cleaning, financial management, using the phone, meal preparation and shopping. Mable received (0) minutes of interventions listed below.  x = interventions performed * = level of progression of activity     Self Care 5/17/2024                            Home Exercises and Education Provided     Education provided:   - Added UE stretches, see Patient Instructions for details  - Ergonomics  - progress towards  goals     Written Home Exercises Provided: Patient instructed to cont prior HEP.  Exercises were reviewed and Mable was able to demonstrate them prior to the end of the session. Mable demonstrated good understanding of the home exercise program provided.     See EMR under Patient Instructions for exercises provided prior visit.        Assessment     Upper arm muscle spasm is less tender and smaller in size compared to last OT treatment. Myofascial tightness has improved as well in R elbow/forearm, but continues to be tight.  Recommend to continue with current OT Plan of Care    Mable is progressing towards her goals and there are no updates to goals at this time. Patient prognosis is Good.     Patient will continue to benefit from skilled outpatient occupational therapy to address the deficits listed in the problem list on initial evaluation provide patient/family education and to maximize patient's level of independence in the home and community environment.     Patient's spiritual, cultural and educational needs considered and patient agreeable to plan of care and goals.    Anticipated barriers to occupational therapy: Pain, Overuse     Goals:     Short Term Goals:  6 weeks  Progress       Pain: Patient will demonstrate improved pain by reports of less than or equal to 3/10 worst pain on the verbal rating scale in order to progress toward maximal functional ability and improve quality of life. PC   Function: Patient will demonstrate improved function as indicated by a functional intake score of more than or equal to 45 out of 100 on FOTO. PC   Mobility: Patient will improve AROM to 50% of stated goals, listed in objective measures above, in order to progress towards independence with functional activities.  PC   Strength: Patient will improve strength to 50% of stated goals, listed in objective measures above, in order to progress towards independence with functional activities.  PC   HEP: Patient will  demonstrate independence with HEP in order to progress toward functional independence. PC      Long Term Goals:  12 weeks  Progress      Pain: Patient will demonstrate improved pain by reports of less than or equal to 2/10 worst pain on the verbal rating scale in order to progress toward maximal functional ability and improve quality of life.   PC   Function: Patient will demonstrate improved function as indicated by a functional intake score of more than or equal to 69 out of 100 on FOTO. PC   Mobility: Patient will improve AROM to stated goals, listed in objective measures above, in order to return to maximal functional potential and improve quality of life. PC   Strength: Patient will improve strength to stated goals, listed in objective measures above, in order to improve functional independence and quality of life. PC   Patient will return to normal ADL's, IADL's, community involvement, recreational activities, and work-related activities with less than or equal to 4/10 pain and maximal function.  PC      Goals Key:  PC= Progressing/Continue;       PM= Partially Met;       Goal Met= Goal Met      DC= Discontinue    Plan     Continue Plan of Care (POC) and progress per patient tolerance.      Bereket Sterling, OT  ,OTD, OTR/L

## 2024-05-17 NOTE — PLAN OF CARE
[facility]   Physical Therapy Initial Evaluation      Date: 5/17/2024   Name: Mable Krishnamurthy [facility] Number: [id number]    Therapy Diagnosis:    Encounter Diagnoses   Name Primary?    Chronic pain of left knee     Right hip pain     Tendinopathy of right gluteal region     Decreased range of motion of both hips Yes    Decreased strength of lower extremity     Decreased functional mobility and endurance       Physician: Daniel Srinivasan MD     Physician Orders: Physical Therapy Evaluation and Treat  Medical Diagnosis from Referral:   M25.562,G89.29 (ICD-10-CM) - Chronic pain of left knee   M25.551 (ICD-10-CM) - Right hip pain   M67.951 (ICD-10-CM) - Tendinopathy of right gluteal region   Evaluation Date: 5/17/2024  Authorization Period Expiration: 12/31/2024  Plan of Care Expiration: 08/09/2024  Progress Note Due: 06/16/2024  Visit # / Visits authorized: 1/1   FOTO: 1/3     Precautions: Standard; Hypertension    Time In: 9:15 AM (late arrival)  Time Out: 9:50 AM  Total Billable Time (timed & untimed codes): 35 minutes    SUBJECTIVE   Date of onset: chronic    History of current condition - Mable reports: having bilateral chronic hip pain for about 20 years with seemingly no MECHANISM OF INJURY. 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 Patient states ever since then both of her lateral hips have been bothering her. Patient states after years of this she began to compensate and started to have left knee meniscal issues, eventually having a meniscectomy. Patient still with left knee pain to this day. Patient states she knows her hips are weak, but is unsure why she is still having pain. Patient also reports stress incontinence and history of scoliosis, both of which may play a role in her hip pain.    Falls:  none    Exercise Regimen: farming activities     Imaging: [] Xray [x] MRI [] CT: Performed on: 2024    Pain:  Current 3/10, worst 6/10, best 3/10   Location: Bilateral Hips and left knee  Description: Aching, Throbbing, Grabbing, Tight, and Sharp  Aggravating Factors: sitting in a car driving, doing farm work and gets extremely painful, sex, sleep  Easing Factors: activity avoidance, rest    Prior Therapy:   [] N/A    [x] Yes: for similar condition   Social History: Not Asked  Occupation: Patient is hematologic NP  Prior Level of Function: Independent and pain free with all ADL, IADL, community mobility and functional activities.   Current Level of Function: Independent with all ADL, IADL, community mobility and functional activities with reports of increased pain and need for increased time and frequent breaks.      Dominant Extremity:    [x] Right    [] Left      Medical History:   Past Medical History:   Diagnosis Date    Anxiety     Bursitis     Right hip    COVID-19 11/3/2021    Depression     Digestive disorder     Herpes genitalia     History of blood clots     HPV (human papilloma virus) infection     HTN (hypertension) 2022    Lupus anticoagulant disorder     Lupus anticoagulant disorder 2019    Pulmonary embolus     Thyroid disease        Surgical History:   Mable Redding  has a past surgical history that includes Hysterectomy (2017); Tonsillectomy;  section; Dilation and curettage of uterus; Hysteroscopy; Lipoma resection; Adenoidectomy; robotic hysterectomy, with salpingectomy (Bilateral); Other surgical history; Arthroscopy of knee (Left, 2022); Knee arthroscopy w/ meniscectomy (Left, 2022); and Arthroscopic chondroplasty of knee joint (Left, 2022).    Medications:   Mable has a current medication list which includes the following prescription(s): alprazolam, amoxicillin-clavulanate 875-125mg, clonazepam, diclofenac, doxycycline, ezetimibe, finasteride,  fluconazole, ivermectin, levothyroxine, liothyronine, lisdexamfetamine, [START ON 5/29/2024] lisdexamfetamine, [START ON 6/28/2024] lisdexamfetamine, loratadine, losartan, metoprolol succinate, minoxidil, minoxidil, mupirocin, nitrofurantoin (macrocrystal-monohydrate), pantoprazole, phentermine, rosuvastatin, venlafaxine, and [DISCONTINUED] clobetasol, and the following Facility-Administered Medications: acetaminophen, albuterol, epinephrine, methylprednisolone sodium succinate, and sodium chloride 0.9%.    Allergies:   Review of patient's allergies indicates:   Allergen Reactions    Aldactone [spironolactone] Swelling    Remdesivir Hives    Sulfa (sulfonamide antibiotics) Edema    Trimethoprim      Other Reaction(s): SWELLING OF MUCUS MEMBRANES          OBJECTIVE     Sensation:  [x] Intact to Light Touch   [] Impaired:    Palpation: Increased tone and tenderness noted with palpation of bilateral lateral hips near greater trochanter and lateral bilateral glutes    Posture: Patient presents with postural abnormalities which include:    [x] Forward Head   [] Increased Lumbar Lordosis   [x] Rounded Shoulder   [] Genu Recurvatum   [] Increased Thoracic Kyphosis [] Genu Valgus   [] Trunk Deviated    [] Pes Planus   [] Scapular Winging    [] Other:     Gait Analysis: The patient ambulated with the following assistive device: none; the pt presents with the following gait abnormalities: trendelenburg    RANGE OF MOTION:    Hip AROM/PROM Right Left Pain/Dysfunction with Movement Goal   Hip Flexion (120) WITHIN FUNCTIONAL LIMITS  WITHIN FUNCTIONAL LIMITS   -   Hip Extension (30) WITHIN FUNCTIONAL LIMITS  WITHIN FUNCTIONAL LIMITS   -   Hip Abduction (45) WITHIN FUNCTIONAL LIMITS  WITHIN FUNCTIONAL LIMITS   -   Hip IR (45) 45 25 Pain lateral left hip 45 bilateral   Hip ER (45) WITHIN FUNCTIONAL LIMITS  WITHIN FUNCTIONAL LIMITS   -     Knee AROM/PROM Right Left Pain/Dysfunction with Movement Goal   Knee Flexion (135) WITHIN  FUNCTIONAL LIMITS  WITHIN FUNCTIONAL LIMITS   -   Knee Extension (0) WITHIN FUNCTIONAL LIMITS  WITHIN FUNCTIONAL LIMITS   -     STRENGTH:    L/E MMT Right   Left Pain/Dysfunction with Movement Goal   Hip Flexion  5/5 5/5  4+/5 Bilateral   Hip Extension  4-/5 3+/5 Pain in bilateral lateral hips 4+/5 Bilateral   Hip Abduction  3+/5 3+/5 Pain on inferior portion of left knee 4+/5 Bilateral   Knee Extension 5/5 4/5  5/5 Bilateral   Knee Flexion 5/5 5/5  5/5 Bilateral   Ankle Dorsiflexion 5/5 5/5  5/5 Bilateral       MUSCLE LENGTH:     Muscle Tested  Right Left  Goal   Hip Flexors decreased decreased Normal Bilateral   Quadriceps decreased decreased Normal Bilateral   Hamstrings  decreased decreased Normal Bilateral   Piriformis  decreased decreased Normal Bilateral       JOINT MOBILITY:     Joint Motion Tested Right   Left  Goal   Hip Hypomobile Hypomobile Normal Bilateral       FUNCTION:     CMS Impairment/Limitation/Restriction for FOTO Hip Survey    Therapist reviewed FOTO scores for Mable on 5/17/2024.   FOTO documents entered into "Mobile Location, IP" - see Media section.    Intake Score: 52         TREATMENT       Mable received the treatments listed below:      THERAPEUTIC EXERCISES to develop strength, endurance, ROM, flexibility, posture, and core stabilization for (25) minutes including:    Intervention Performed Today    Patient educated on plan of care and current condition, pelvic health x Patient verbalized understanding                                         Plan for Next Visit: nu-step, lower trunk rotations, posterior pelvic tilts, ball squeeze, supine clamshell, bridge, clamshells, straight leg raise, supine dead bug, hamstring ball rolls       PATIENT EDUCATION AND HOME EXERCISES     Education provided: (25) minutes  PURPOSE: Patient educated on the impairments noted above and the effects of physical therapy intervention to improve overall condition and QOL.   EXERCISE: Patient was educated on all the above  exercise prior/during/after for proper posture, positioning, and execution for safe performance with home exercise program.   STRENGTH: Patient educated on the importance of improved core and extremity strength in order to improve alignment of the spine and extremities with static positions and dynamic movement.     Written Home Exercises Provided: no.  Exercises were reviewed and Mable was able to demonstrate them prior to the end of the session.  Mable demonstrated good  understanding of the education provided. See EMR under Patient Instructions for exercises provided during therapy sessions.    ASSESSMENT     Mable is a 53 y.o. female referred to outpatient Physical Therapy with a medical diagnosis of [see above.] Patient presents with impairments including: impairments list: ROM, strength, endurance, joint mobility, muscle length, posture, gait mechanics, core strength and stability, and functional movement patterns.    Patient prognosis is Good.   Patient will benefit from skilled outpatient Physical Therapy to address the deficits stated above and in the chart below, provide patient/family education, and to maximize patient's level of independence.     Plan of care discussed with patient: Yes  Patient's spiritual, cultural and educational needs considered and patient is agreeable to the plan of care and goals as stated below:     Anticipated Barriers for therapy: co-morbidities, chronicity of condition, and occupation    Medical Necessity is demonstrated by the following   History  Co-morbidities and personal factors that may impact the plan of care [] LOW: no personal factors / co-morbidities  [] MODERATE: 1-2 personal factors / co-morbidities  [x] HIGH: 3+ personal factors / co-morbidities    Moderate / High Support Documentation:   Past Medical History:   Diagnosis Date    Anxiety     Bursitis     Right hip    COVID-19 11/3/2021    Depression     Digestive disorder     Herpes genitalia     History of  blood clots     HPV (human papilloma virus) infection     HTN (hypertension) 5/17/2022    Lupus anticoagulant disorder     Lupus anticoagulant disorder 1/30/2019    Pulmonary embolus     Thyroid disease          Examination  Body Structures and Functions, activity limitations and participation restrictions that may impact the plan of care [] LOW: addressing 1-2 elements  [] MODERATE: 3+ elements  [x] HIGH: 4+ elements (please support below)    Moderate / High Support Documentation: See evaluation / objective measurements above and FOTO.     Clinical Presentation [] LOW: stable  [] MODERATE: Evolving  [x] HIGH: Unstable     Decision Making/ Complexity Score: high         Goals:  Reviewed:5/17/2024      Short Term Goals:  6 weeks Status  Date Met   PAIN: Patient will report worst pain of 5/10 in order to progress toward max functional ability and improve quality of life. [x] Progressing  [] Met  [] Not Met    FUNCTION: Patient will demonstrate improved function as indicated by a functional intake score of more than or equal to 54 out of 100 on FOTO. [x] Progressing  [] Met  [] Not Met    MOBILITY: Patient will improve Range of Motion to 50% of stated goals, listed in objective measures above, in order to progress towards independence with functional activities.  [x] Progressing  [] Met  [] Not Met    STRENGTH: Patient will improve strength to 50% of stated goals, listed in objective measures above, in order to progress towards independence with functional activities.  [x] Progressing  [] Met  [] Not Met      Long Term Goals:  12 weeks Status Date Met   PAIN: Patient will report worst pain of 3/10 in order to progress toward max functional ability and improve quality of life [x] Progressing  [] Met  [] Not Met    FUNCTION: Patient will demonstrate improved function as indicated by a functional intake score of more than or equal to 56 out of 100 on FOTO. [x] Progressing  [] Met  [] Not Met    MOBILITY: Patient will  improve Range of Motion to stated goals, listed in objective measures above, in order to return to maximal functional potential and improve quality of life. [x] Progressing  [] Met  [] Not Met    STRENGTH: Patient will improve strength to stated goals, listed in objective measures above, in order to improve functional independence and quality of life. [x] Progressing  [] Met  [] Not Met    Patient will return to normal ADL's, IADL's, community involvement, recreational activities, and work-related activities with less than or equal to 3/10 pain and maximal function.  [x] Progressing  [] Met  [] Not Met      PLAN   Plan of Care Certification: 5/17/2024 to 08/09/2024.    Outpatient Physical Therapy 2 times weekly for 12 weeks to include any combination of the following interventions: virtual visits, dry needling, modalities, electrical stimulation (IFC, Pre-Mod, Attended with Functional Dry Needling), Cervical/Lumbar Traction, Gait Training, Manual Therapy, Moist Heat/ Ice, Neuromuscular Re-ed, Patient Education, Self Care, Therapeutic Exercise, and Therapeutic Activites     June Peck, PT, DPT      Physician's Signature: ___________________________________________________

## 2024-05-31 ENCOUNTER — CLINICAL SUPPORT (OUTPATIENT)
Dept: REHABILITATION | Facility: HOSPITAL | Age: 54
End: 2024-05-31
Payer: COMMERCIAL

## 2024-05-31 DIAGNOSIS — Z74.09 DECREASED FUNCTIONAL MOBILITY AND ENDURANCE: ICD-10-CM

## 2024-05-31 DIAGNOSIS — R29.898 DECREASED STRENGTH OF LOWER EXTREMITY: ICD-10-CM

## 2024-05-31 DIAGNOSIS — Z78.9 DECREASED ACTIVITIES OF DAILY LIVING (ADL): ICD-10-CM

## 2024-05-31 DIAGNOSIS — M25.60 DECREASED RANGE OF MOTION: ICD-10-CM

## 2024-05-31 DIAGNOSIS — M25.652 DECREASED RANGE OF MOTION OF BOTH HIPS: Primary | ICD-10-CM

## 2024-05-31 DIAGNOSIS — M25.651 DECREASED RANGE OF MOTION OF BOTH HIPS: Primary | ICD-10-CM

## 2024-05-31 DIAGNOSIS — M62.81 DECREASED MUSCLE STRENGTH: ICD-10-CM

## 2024-05-31 DIAGNOSIS — R52 PAIN: Primary | ICD-10-CM

## 2024-05-31 PROCEDURE — 97110 THERAPEUTIC EXERCISES: CPT

## 2024-05-31 PROCEDURE — 97140 MANUAL THERAPY 1/> REGIONS: CPT

## 2024-05-31 PROCEDURE — 97112 NEUROMUSCULAR REEDUCATION: CPT

## 2024-05-31 PROCEDURE — 97035 APP MDLTY 1+ULTRASOUND EA 15: CPT

## 2024-05-31 NOTE — PROGRESS NOTES
[facility]   Physical Therapy Treatment Note        Name: Mable Redding  Clinic Number: [id number]    Therapy Diagnosis:   Encounter Diagnoses   Name Primary?    Decreased range of motion of both hips Yes    Decreased strength of lower extremity     Decreased functional mobility and endurance      Physician: Daniel Srinivasan MD    Visit Date: 5/31/2024    Physician Orders: Physical Therapy Evaluation and Treat  Medical Diagnosis from Referral:   M25.562,G89.29 (ICD-10-CM) - Chronic pain of left knee   M25.551 (ICD-10-CM) - Right hip pain   M67.951 (ICD-10-CM) - Tendinopathy of right gluteal region   Evaluation Date: 5/17/2024  Authorization Period Expiration: 12/31/2024  Plan of Care Expiration: 08/09/2024  Progress Note Due: 06/16/2024  Visit # / Visits authorized: 1/1   FOTO: 1/3      Precautions: Standard; Hypertension    Time In: 8:30 am  Time Out: 9:30 am  Total Billable Time: 50 minutes      SUBJECTIVE     Pt reports: she is continuing to have pain. 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 Discussed importance of strengthening and flexibility and strengthening for activities that she would like to be able to continue performing.  .  She  N/A  compliant with home exercise program.  Response to previous treatment: no change  Functional change: no change    Pain: NT/10  Location: Bilateral Hips and left knee     OBJECTIVE     Objective Measures updated at progress report unless specified.       TREATMENT       CPT Intervention Duration / Intensity  5/31/2024    MT       TE  nustep  10 min      LTR with feet wide for hip ROM  3 min    LTR on swiss ball  15x      piriformis stretch  30 sec 3x    Open book  6x each   NMR PPT  3 min    PPT with march  3 min      hip adduction isometric  10x/ 3 sets      supine clamshell blue band  10x/ 3 sets    bridge 10x/ 3 sets    SLR     Supine dead bug with green swiss ball for core activation 5x/ 2 set     Hamstring ball roll outs (green ball with heel dig) 10x/ 3 sets        TA                                                               PLAN  progress as indicated           CPT Codes available for Billing:   (00) minutes of Manual therapy (MT) to improve pain and ROM.  (23) minutes of Therapeutic Exercise (TE) to develop strength, endurance, range of motion, and flexibility.  (27) minutes of Neuromuscular Re-Education (NMR)  to improve: Balance, Coordination, Kinesthetic, Sense, Proprioception, and Posture.  (00) minutes of Therapeutic Activities (TA) to improve functional performance.  Unattended Electrical Stimulation (ES) for muscle performance or pain modulation.  BFR: Blood flow restriction applied during exercise  NP or (-): Not Performed              PATIENT EDUCATION AND HOME EXERCISES     Home Exercises Provided and Patient Education Provided     Education provided:   - Importance of core strength    Written Home Exercises Provided:  yes . Exercises were reviewed and Mable was able to demonstrate them prior to the end of the session.  Mable demonstrated good  understanding of the education provided. See EMR under Patient Instructions for exercises provided during therapy sessions      ASSESSMENT     Patient tolerated all initial treatment well and had no complaints post treatment session. She has increased difficulty with activation of lower abdominal muscle's today. She did require verbal and tactile cues as well. Encouraged home exercise program.    Mable Is progressing well towards her goals.   Pt prognosis is Good.     Pt will continue to benefit from skilled outpatient physical therapy to address the deficits listed in the problem list box on initial evaluation, provide pt/family education and to maximize pt's level of independence in the home and community environment.     Pt's spiritual, cultural and educational needs considered and pt agreeable to plan of care and goals.     Anticipated  barriers to physical therapy:  co-morbidities, chronicity of condition, and occupation     Goals:   Reviewed: 5/31/2024       Short Term Goals:  6 weeks Status  Date Met   PAIN: Patient will report worst pain of 5/10 in order to progress toward max functional ability and improve quality of life. [x] Progressing  [] Met  [] Not Met     FUNCTION: Patient will demonstrate improved function as indicated by a functional intake score of more than or equal to 54 out of 100 on FOTO. [x] Progressing  [] Met  [] Not Met     MOBILITY: Patient will improve Range of Motion to 50% of stated goals, listed in objective measures above, in order to progress towards independence with functional activities.  [x] Progressing  [] Met  [] Not Met     STRENGTH: Patient will improve strength to 50% of stated goals, listed in objective measures above, in order to progress towards independence with functional activities.  [x] Progressing  [] Met  [] Not Met        Long Term Goals:  12 weeks Status Date Met   PAIN: Patient will report worst pain of 3/10 in order to progress toward max functional ability and improve quality of life [x] Progressing  [] Met  [] Not Met     FUNCTION: Patient will demonstrate improved function as indicated by a functional intake score of more than or equal to 56 out of 100 on FOTO. [x] Progressing  [] Met  [] Not Met     MOBILITY: Patient will improve Range of Motion to stated goals, listed in objective measures above, in order to return to maximal functional potential and improve quality of life. [x] Progressing  [] Met  [] Not Met     STRENGTH: Patient will improve strength to stated goals, listed in objective measures above, in order to improve functional independence and quality of life. [x] Progressing  [] Met  [] Not Met     Patient will return to normal ADL's, IADL's, community involvement, recreational activities, and work-related activities with less than or equal to 3/10 pain and maximal function.  [x]  Progressing  [] Met  [] Not Met          PLAN     Monitor response to today's treatment session and progress with Physical Therapy plan of care as indicated.    Plan of Care Certification: 5/17/2024 to 08/09/2024.     Outpatient Physical Therapy 2 times weekly for 12 weeks to include any combination of the following interventions: virtual visits, dry needling, modalities, electrical stimulation (IFC, Pre-Mod, Attended with Functional Dry Needling), Cervical/Lumbar Traction, Gait Training, Manual Therapy, Moist Heat/ Ice, Neuromuscular Re-ed, Patient Education, Self Care, Therapeutic Exercise, and Therapeutic Activites     Katiuska Quijano, PT

## 2024-05-31 NOTE — PROGRESS NOTES
[facility]  Occupational Therapy Treatment Note     Date: 5/31/2024  [id number]    Therapy Diagnosis:   Encounter Diagnoses   Name Primary?    Pain Yes    Decreased range of motion     Decreased muscle strength     Decreased activities of daily living (ADL)      Physician: Jeannie Cárdenas MD    Physician Orders: Occupational Therapy to Evaluate and Treat  Medical Diagnosis:   S53.439A (ICD-10-CM) - Sprain of lateral collateral ligament of elbow   S56.519A (ICD-10-CM) - Partial tear of common extensor tendon of elbow      Surgical Procedure and Date: N/A     Evaluation Date: 4/12/2024  Insurance Authorization Period Expiration: 4/5/2024 - 4/5/2025  Plan of Care Certification Period: 4/12/2024 - 7/12/2024  Progress Note Due: 5/12/2024      Date of Return to MD: N/A  Visit # / Visits authorized: 6 / 25  FOTO: 1/3     Precautions:  Standard     Time In: 9:40  Time Out: 10:25  Total Treatment Time: 45 minutes  Total Billable Time: 45 minutes    Subjective     Patient reports: pain remains localized to the R lateral epicondyle  she was compliant with home exercise program given last session.   Response to previous treatment: Tolerated well  Functional change: able to engage in her normal activity at home and work, but with pain    Pain: 4/10  Location: right elbow    Objective     Objective measures updated at progress report unless specified.    Treatment     Supervised Modalities: Modalities such as hot/cold packs, traction, unattended electrical stimulation, paraffin bath, fluidotherapy to reduce pain and increase soft tissue extensibility. Mable received (0) minutes of modalities listed below after being cleared for contraindications.  x = modalities performed     Supervised Modalities 5/31/2024                          Direct Contact Modalities: Modalities such as attended electrical stimulation, iontophoresis, ultrasound to reduce pain and increase  "soft tissue extensibility. Mable received (10) minutes of modalities listed below after being cleared for contraindications. x = modalities performed     Direct Contact Modalities 5/31/2024    1.0 MHz, .8 W/cm2, 50% to R lat. elbow, to decrease pain & edema, increase circulation and tissue extensibility.  x 10 minutes, time equally split between areas             Manual Therapy Techniques: Joint mobilizations, Soft tissue Mobilization, and mobilization with movement applied to the area listed below. Mable received (15) minutes of interventions. x = intervention performed today    Manual Intervention 5/31/2024    Extensive Soft Tissue Mobilization, Myofacial Release, Manual Lymphatic Drainage, IASTM, Cupping.  x Right elbow  to increase blood flow/circulation, improve soft tissue pliability and decrease pain   Joint Mobilizations     Kinesiotaping x "Tennis elbow" taping applied to R elbow and "jellyfish" taping pattern applied to R upper arm for pain and soft tissue management. Patient instructed on purpose, wear, care, precautions to monitor and removal of KT. Patient verbalized understanding of all instructions provided.     Mobilization with movement     Scar Management  Provided silicone scar gel sheet for scar management due to raised, hypertrophic scarring on .   Patient verbalized understanding of instructions provided on wear schedule, care and precautions to monitor.   Patient was also provided with a sleeve of Tubi  size  to wear at night       Therapeutic Exercises: to develop strength, endurance, ROM, flexibility, posture and core stabilization. Mable received (20) minutes of exercises listed below. x = performed today * = level of progression of activity     Therapeutic Exercise 5/31/2024    Forearm stretch 3 ways elbow bent x 3x - 15 second hold   Forearm stretch 3 ways elbow extended x 3x - 15 second hold   Forearm stretch, elbow extended, thumb down x 3 reps, 15 second hold   UE stretches:   "   -Posterior capsule stretch x 3 reps, 15 sec hold each   -Triceps stretch x 3 reps, 15 sec hold each   -biceps stretch hand 2 ways x 3 reps, 15 sec hold each   's stretch w/ therapist assist x 3 reps, 15 sec hold each        Therapeutic Activities: Everyday tasks to address the combined need of improved strength, range of motion, and endurance to achieve increased independence. While simulating these activities, the person is applying the gained skills of strength and improved range of motion in a practical way.  Mable received (0) minutes of activities listed below. x = activities performed today * = level of progression of activity     Therapeutic Activities 5/31/2024                          Neuromuscular Re-education: the use of functional strengthening, stretching, balancing and coordination activities that train the nerves and muscles to react and communicate to restore normal body movement patterns to increase self care independence. Mable received (0) minutes of interventions listed below.  x = interventions performed today * = level of progression of activity     Neuromuscular Re-education 5/31/2024                          Self Care: Fundamental skills required to independently care for oneself. Activities of daily living such as eating, bathing, dressing, toileting, grooming, sleeping, transfers and mobility. Instrumental activities of daily living such as driving, medication management, pet care, home management/cleaning, financial management, using the phone, meal preparation and shopping. Mable received (0) minutes of interventions listed below.  x = interventions performed * = level of progression of activity     Self Care 5/31/2024                            Home Exercises and Education Provided     Education provided:   - Added UE stretches, see Patient Instructions for details  - Ergonomics  - progress towards goals     Written Home Exercises Provided: Patient instructed to cont prior  HEP.  Exercises were reviewed and Mable was able to demonstrate them prior to the end of the session. Mable demonstrated good understanding of the home exercise program provided.     See EMR under Patient Instructions for exercises provided prior visit.        Assessment     Pain continues to persist in R lateral elbow regardless of conservative treatment in OT, therefore, recommend patient to return to referring MD for follow up on additional treatment options, including possible cortisone injection.     Mable is not progressing towards her goals and there are no updates to goals at this time. Patient prognosis is fair    Patient will continue to benefit from skilled outpatient occupational therapy to address the deficits listed in the problem list on initial evaluation provide patient/family education and to maximize patient's level of independence in the home and community environment.     Patient's spiritual, cultural and educational needs considered and patient agreeable to plan of care and goals.    Anticipated barriers to occupational therapy: Pain, Overuse     Goals:     Short Term Goals:  6 weeks  Progress       Pain: Patient will demonstrate improved pain by reports of less than or equal to 3/10 worst pain on the verbal rating scale in order to progress toward maximal functional ability and improve quality of life. PC   Function: Patient will demonstrate improved function as indicated by a functional intake score of more than or equal to 45 out of 100 on FOTO. PC   Mobility: Patient will improve AROM to 50% of stated goals, listed in objective measures above, in order to progress towards independence with functional activities.  PC   Strength: Patient will improve strength to 50% of stated goals, listed in objective measures above, in order to progress towards independence with functional activities.  PC   HEP: Patient will demonstrate independence with HEP in order to progress toward functional  independence. PC      Long Term Goals:  12 weeks  Progress      Pain: Patient will demonstrate improved pain by reports of less than or equal to 2/10 worst pain on the verbal rating scale in order to progress toward maximal functional ability and improve quality of life.   PC   Function: Patient will demonstrate improved function as indicated by a functional intake score of more than or equal to 69 out of 100 on FOTO. PC   Mobility: Patient will improve AROM to stated goals, listed in objective measures above, in order to return to maximal functional potential and improve quality of life. PC   Strength: Patient will improve strength to stated goals, listed in objective measures above, in order to improve functional independence and quality of life. PC   Patient will return to normal ADL's, IADL's, community involvement, recreational activities, and work-related activities with less than or equal to 4/10 pain and maximal function.  PC      Goals Key:  PC= Progressing/Continue;       PM= Partially Met;       Goal Met= Goal Met      DC= Discontinue    Plan     Continue Plan of Care (POC) and progress per patient tolerance.      Bereket Sterling, OT  ,OTD, OTR/L

## 2024-06-06 ENCOUNTER — OFFICE VISIT (OUTPATIENT)
Dept: SPORTS MEDICINE | Facility: CLINIC | Age: 54
End: 2024-06-06
Payer: COMMERCIAL

## 2024-06-06 VITALS — WEIGHT: 212.5 LBS | BODY MASS INDEX: 34.15 KG/M2 | HEIGHT: 66 IN

## 2024-06-06 DIAGNOSIS — S56.511A PARTIAL TEAR OF COMMON EXTENSOR TENDON OF RIGHT ELBOW: Primary | ICD-10-CM

## 2024-06-06 PROCEDURE — 99213 OFFICE O/P EST LOW 20 MIN: CPT | Mod: S$GLB,,, | Performed by: STUDENT IN AN ORGANIZED HEALTH CARE EDUCATION/TRAINING PROGRAM

## 2024-06-06 PROCEDURE — 99999 PR PBB SHADOW E&M-EST. PATIENT-LVL V: CPT | Mod: PBBFAC,,, | Performed by: STUDENT IN AN ORGANIZED HEALTH CARE EDUCATION/TRAINING PROGRAM

## 2024-06-07 ENCOUNTER — CLINICAL SUPPORT (OUTPATIENT)
Dept: REHABILITATION | Facility: HOSPITAL | Age: 54
End: 2024-06-07
Payer: COMMERCIAL

## 2024-06-07 DIAGNOSIS — Z78.9 DECREASED ACTIVITIES OF DAILY LIVING (ADL): ICD-10-CM

## 2024-06-07 DIAGNOSIS — R29.898 DECREASED STRENGTH OF LOWER EXTREMITY: ICD-10-CM

## 2024-06-07 DIAGNOSIS — M62.81 DECREASED MUSCLE STRENGTH: ICD-10-CM

## 2024-06-07 DIAGNOSIS — M25.652 DECREASED RANGE OF MOTION OF BOTH HIPS: Primary | ICD-10-CM

## 2024-06-07 DIAGNOSIS — M25.651 DECREASED RANGE OF MOTION OF BOTH HIPS: Primary | ICD-10-CM

## 2024-06-07 DIAGNOSIS — M25.60 DECREASED RANGE OF MOTION: ICD-10-CM

## 2024-06-07 DIAGNOSIS — R52 PAIN: Primary | ICD-10-CM

## 2024-06-07 DIAGNOSIS — Z74.09 DECREASED FUNCTIONAL MOBILITY AND ENDURANCE: ICD-10-CM

## 2024-06-07 PROCEDURE — 97140 MANUAL THERAPY 1/> REGIONS: CPT

## 2024-06-07 PROCEDURE — 97110 THERAPEUTIC EXERCISES: CPT

## 2024-06-07 PROCEDURE — 97035 APP MDLTY 1+ULTRASOUND EA 15: CPT

## 2024-06-07 PROCEDURE — 97112 NEUROMUSCULAR REEDUCATION: CPT

## 2024-06-07 NOTE — PROGRESS NOTES
[facility]   Physical Therapy Treatment Note        Name: Mable Redding  Clinic Number: [id number]    Therapy Diagnosis:   Encounter Diagnoses   Name Primary?    Decreased range of motion of both hips Yes    Decreased strength of lower extremity     Decreased functional mobility and endurance      Physician: Daniel Srinivasan MD    Visit Date: 6/7/2024    Physician Orders: Physical Therapy Evaluation and Treat  Medical Diagnosis from Referral:   M25.562,G89.29 (ICD-10-CM) - Chronic pain of left knee   M25.551 (ICD-10-CM) - Right hip pain   M67.951 (ICD-10-CM) - Tendinopathy of right gluteal region   Evaluation Date: 5/17/2024  Authorization Period Expiration: 12/31/2024  Plan of Care Expiration: 08/09/2024  Progress Note Due: 06/16/2024  Visit # / Visits authorized: 2/12  FOTO: 1/3      Precautions: Standard; Hypertension    Time In: 8:03 am  Time Out: 9:00 am  Total Billable Time: 57 minutes      SUBJECTIVE     Pt reports: she is feeling a little bit better since last session, but does report some core soreness. Patient states she is excited to continue progressing.   .  She  N/A  compliant with home exercise program.  Response to previous treatment: good soreness  Functional change: no change    Pain: NT/10  Location: Bilateral Hips and left knee     OBJECTIVE     Objective Measures updated at progress report unless specified.       TREATMENT       CPT Intervention Duration / Intensity  6/7/2024    MT       TE  nustep  8 minutes Level 3     LTR with feet wide for hip ROM  3 min    LTR on swiss ball  -      piriformis stretch -    Open book  2 minutes bilateral   NMR PPT 3 min    PPT with march  3 min      hip adduction isometric  10x/ 3 sets      supine clamshell blue band  10x/ 3 sets    bridge 10x/ 3 sets    SLR 10x / 3sets    Supine dead bug lower extremity only 10x/ 2 set    Hamstring ball roll outs (green ball with heel dig) 10x/ 3 sets        TA                                                                PLAN  progress as indicated, sidelying hip abduction and clamshells, shuttle squat, heel taps           CPT Codes available for Billing:   (00) minutes of Manual therapy (MT) to improve pain and ROM.  (15) minutes of Therapeutic Exercise (TE) to develop strength, endurance, range of motion, and flexibility.  (42) minutes of Neuromuscular Re-Education (NMR)  to improve: Balance, Coordination, Kinesthetic, Sense, Proprioception, and Posture.  (00) minutes of Therapeutic Activities (TA) to improve functional performance.  Unattended Electrical Stimulation (ES) for muscle performance or pain modulation.  BFR: Blood flow restriction applied during exercise  NP or (-): Not Performed       PATIENT EDUCATION AND HOME EXERCISES     Home Exercises Provided and Patient Education Provided     Education provided:   - Importance of core strength    Written Home Exercises Provided:  yes . Exercises were reviewed and Mable was able to demonstrate them prior to the end of the session.  Mable demonstrated good  understanding of the education provided. See EMR under Patient Instructions for exercises provided during therapy sessions      ASSESSMENT     Progressed patient with straight leg raises to strengthening hip flexors, quadriceps and core. Patient tolerated this well and reported good fatigue by end of session, but no complaints of increased pain. Plan to progress patient with further lateral glute/hip strengthening and endurance as tolerated.    Mable Is progressing well towards her goals.   Pt prognosis is Good.     Pt will continue to benefit from skilled outpatient physical therapy to address the deficits listed in the problem list box on initial evaluation, provide pt/family education and to maximize pt's level of independence in the home and community environment.     Pt's spiritual, cultural and educational needs considered and pt agreeable to plan of care and goals.     Anticipated  barriers to physical therapy:  co-morbidities, chronicity of condition, and occupation     Goals:   Reviewed: 6/7/2024       Short Term Goals:  6 weeks Status  Date Met   PAIN: Patient will report worst pain of 5/10 in order to progress toward max functional ability and improve quality of life. [x] Progressing  [] Met  [] Not Met     FUNCTION: Patient will demonstrate improved function as indicated by a functional intake score of more than or equal to 54 out of 100 on FOTO. [x] Progressing  [] Met  [] Not Met     MOBILITY: Patient will improve Range of Motion to 50% of stated goals, listed in objective measures above, in order to progress towards independence with functional activities.  [x] Progressing  [] Met  [] Not Met     STRENGTH: Patient will improve strength to 50% of stated goals, listed in objective measures above, in order to progress towards independence with functional activities.  [x] Progressing  [] Met  [] Not Met        Long Term Goals:  12 weeks Status Date Met   PAIN: Patient will report worst pain of 3/10 in order to progress toward max functional ability and improve quality of life [x] Progressing  [] Met  [] Not Met     FUNCTION: Patient will demonstrate improved function as indicated by a functional intake score of more than or equal to 56 out of 100 on FOTO. [x] Progressing  [] Met  [] Not Met     MOBILITY: Patient will improve Range of Motion to stated goals, listed in objective measures above, in order to return to maximal functional potential and improve quality of life. [x] Progressing  [] Met  [] Not Met     STRENGTH: Patient will improve strength to stated goals, listed in objective measures above, in order to improve functional independence and quality of life. [x] Progressing  [] Met  [] Not Met     Patient will return to normal ADL's, IADL's, community involvement, recreational activities, and work-related activities with less than or equal to 3/10 pain and maximal function.  [x]  Progressing  [] Met  [] Not Met          PLAN     Monitor response to today's treatment session and progress with Physical Therapy plan of care as indicated.    Plan of Care Certification: 5/17/2024 to 08/09/2024.     Outpatient Physical Therapy 2 times weekly for 12 weeks to include any combination of the following interventions: virtual visits, dry needling, modalities, electrical stimulation (IFC, Pre-Mod, Attended with Functional Dry Needling), Cervical/Lumbar Traction, Gait Training, Manual Therapy, Moist Heat/ Ice, Neuromuscular Re-ed, Patient Education, Self Care, Therapeutic Exercise, and Therapeutic Activites     June Peck, PT

## 2024-06-07 NOTE — PROGRESS NOTES
[facility]  Occupational Therapy Treatment Note     Date: 6/7/2024  [id number]    Therapy Diagnosis:   Encounter Diagnoses   Name Primary?    Pain Yes    Decreased range of motion     Decreased muscle strength     Decreased activities of daily living (ADL)      Physician: Jeannie Cárdenas MD    Physician Orders: Occupational Therapy to Evaluate and Treat  Medical Diagnosis:   S53.439A (ICD-10-CM) - Sprain of lateral collateral ligament of elbow   S56.519A (ICD-10-CM) - Partial tear of common extensor tendon of elbow      Surgical Procedure and Date: N/A     Evaluation Date: 4/12/2024  Insurance Authorization Period Expiration: 4/5/2024 - 4/5/2025  Plan of Care Certification Period: 4/12/2024 - 7/12/2024  Progress Note Due: 5/12/2024      Date of Return to MD: N/A  Visit # / Visits authorized: 7 / 25  FOTO: 1/3     Precautions:  Standard     Time In: 9:30  Time Out: 10:25  Total Treatment Time: 55 minutes  Total Billable Time: 55 minutes    Subjective     Patient reports: she has been taking Diclofenac p.o and using the Voltaren gel topically and feels it is helping  she was compliant with home exercise program given last session.   Response to previous treatment: Tolerated well  Functional change: able to engage in her normal activity at home and work, but with pain    Pain: 2/10  Location: right elbow    Objective     Objective measures updated at progress report unless specified.    Treatment     Direct Contact Modalities: Modalities such as attended electrical stimulation, iontophoresis, ultrasound to reduce pain and increase soft tissue extensibility. Mable received (10) minutes of modalities listed below after being cleared for contraindications. x = modalities performed     Direct Contact Modalities 6/7/2024    1.0 MHz, .8 W/cm2, 50% to R lat. elbow, to decrease pain & edema, increase circulation and tissue extensibility.  x 10 minutes, time  "equally split between areas           Manual Therapy Techniques: Joint mobilizations, Soft tissue Mobilization, and mobilization with movement applied to the area listed below. Mable received (25) minutes of interventions. x = intervention performed today    Manual Intervention 6/7/2024    Extensive Soft Tissue Mobilization, Myofacial Release, Manual Lymphatic Drainage, IASTM, Cupping.  x Right elbow  to increase blood flow/circulation, improve soft tissue pliability and decrease pain   Joint Mobilizations     Kinesiotaping x "Tennis elbow" taping applied to R elbow     Mobilization with movement     Scar Management  Provided silicone scar gel sheet for scar management due to raised, hypertrophic scarring on .   Patient verbalized understanding of instructions provided on wear schedule, care and precautions to monitor.   Patient was also provided with a sleeve of Tubi  size  to wear at night       Therapeutic Exercises: to develop strength, endurance, ROM, flexibility, posture and core stabilization. Mable received (20) minutes of exercises listed below. x = performed today * = level of progression of activity     Therapeutic Exercise 6/7/2024    Forearm stretch 3 ways elbow bent x 3x - 15 second hold   Forearm stretch 3 ways elbow extended x 3x - 15 second hold   Forearm stretch, elbow extended, thumb down x 3 reps, 15 second hold   UE stretches:     -Posterior capsule stretch x 3 reps, 15 sec hold each   -Triceps stretch x 3 reps, 15 sec hold each   -biceps stretch hand 2 ways x 3 reps, 15 sec hold each   's stretch w/ therapist assist x 3 reps, 15 sec hold each        Home Exercises and Education Provided     Education provided:   - Reviewed UE stretches, see Patient Instructions for details  - Discussed benefits of compression sleeve  - Ergonomics  - progress towards goals     Written Home Exercises Provided: Patient instructed to cont prior HEP.  Exercises were reviewed and Mable was able to " demonstrate them prior to the end of the session. Mable demonstrated good understanding of the home exercise program provided.     See EMR under Patient Instructions for exercises provided prior visit.        Assessment     Pain has decreased in R lateral elbow since last OT treatment possibly due to patient starting oral and topical NSAIDs.  Recommend to continue OT per Plan of Care and progress, as tolerated..     Mable is not progressing towards her goals and there are no updates to goals at this time. Patient prognosis is fair    Patient will continue to benefit from skilled outpatient occupational therapy to address the deficits listed in the problem list on initial evaluation provide patient/family education and to maximize patient's level of independence in the home and community environment.     Patient's spiritual, cultural and educational needs considered and patient agreeable to plan of care and goals.    Anticipated barriers to occupational therapy: Pain, Overuse     Goals:     Short Term Goals:  6 weeks  Progress       Pain: Patient will demonstrate improved pain by reports of less than or equal to 3/10 worst pain on the verbal rating scale in order to progress toward maximal functional ability and improve quality of life. PC   Function: Patient will demonstrate improved function as indicated by a functional intake score of more than or equal to 45 out of 100 on FOTO. PC   Mobility: Patient will improve AROM to 50% of stated goals, listed in objective measures above, in order to progress towards independence with functional activities.  PC   Strength: Patient will improve strength to 50% of stated goals, listed in objective measures above, in order to progress towards independence with functional activities.  PC   HEP: Patient will demonstrate independence with HEP in order to progress toward functional independence. PC      Long Term Goals:  12 weeks  Progress      Pain: Patient will demonstrate  improved pain by reports of less than or equal to 2/10 worst pain on the verbal rating scale in order to progress toward maximal functional ability and improve quality of life.   PC   Function: Patient will demonstrate improved function as indicated by a functional intake score of more than or equal to 69 out of 100 on FOTO. PC   Mobility: Patient will improve AROM to stated goals, listed in objective measures above, in order to return to maximal functional potential and improve quality of life. PC   Strength: Patient will improve strength to stated goals, listed in objective measures above, in order to improve functional independence and quality of life. PC   Patient will return to normal ADL's, IADL's, community involvement, recreational activities, and work-related activities with less than or equal to 4/10 pain and maximal function.  PC      Goals Key:  PC= Progressing/Continue;       PM= Partially Met;       Goal Met= Goal Met      DC= Discontinue    Plan     Continue Plan of Care (POC) and progress per patient tolerance.      Bereket Sterling, OT  ,OTD, OTR/L

## 2024-06-14 ENCOUNTER — CLINICAL SUPPORT (OUTPATIENT)
Dept: REHABILITATION | Facility: HOSPITAL | Age: 54
End: 2024-06-14
Payer: COMMERCIAL

## 2024-06-14 DIAGNOSIS — R52 PAIN: Primary | ICD-10-CM

## 2024-06-14 DIAGNOSIS — M62.81 DECREASED MUSCLE STRENGTH: ICD-10-CM

## 2024-06-14 DIAGNOSIS — R29.898 DECREASED STRENGTH OF LOWER EXTREMITY: ICD-10-CM

## 2024-06-14 DIAGNOSIS — M25.652 DECREASED RANGE OF MOTION OF BOTH HIPS: Primary | ICD-10-CM

## 2024-06-14 DIAGNOSIS — Z74.09 DECREASED FUNCTIONAL MOBILITY AND ENDURANCE: ICD-10-CM

## 2024-06-14 DIAGNOSIS — Z78.9 DECREASED ACTIVITIES OF DAILY LIVING (ADL): ICD-10-CM

## 2024-06-14 DIAGNOSIS — M25.60 DECREASED RANGE OF MOTION: ICD-10-CM

## 2024-06-14 DIAGNOSIS — M25.651 DECREASED RANGE OF MOTION OF BOTH HIPS: Primary | ICD-10-CM

## 2024-06-14 PROCEDURE — 97110 THERAPEUTIC EXERCISES: CPT

## 2024-06-14 PROCEDURE — 97140 MANUAL THERAPY 1/> REGIONS: CPT

## 2024-06-14 PROCEDURE — 97530 THERAPEUTIC ACTIVITIES: CPT

## 2024-06-14 PROCEDURE — 97112 NEUROMUSCULAR REEDUCATION: CPT

## 2024-06-14 PROCEDURE — 97035 APP MDLTY 1+ULTRASOUND EA 15: CPT

## 2024-06-14 NOTE — PROGRESS NOTES
[facility]   Physical Therapy Treatment Note      Name: Mable Redding  Clinic Number: [id number]    Therapy Diagnosis:   Encounter Diagnoses   Name Primary?    Decreased range of motion of both hips Yes    Decreased strength of lower extremity     Decreased functional mobility and endurance      Physician: Daniel Srinivasan MD    Visit Date: 6/14/2024    Physician Orders: Physical Therapy Evaluation and Treat  Medical Diagnosis from Referral:   M25.562,G89.29 (ICD-10-CM) - Chronic pain of left knee   M25.551 (ICD-10-CM) - Right hip pain   M67.951 (ICD-10-CM) - Tendinopathy of right gluteal region   Evaluation Date: 5/17/2024  Authorization Period Expiration: 12/31/2024  Plan of Care Expiration: 08/09/2024  Progress Note Due: 06/16/2024  Visit # / Visits authorized: 3/12  FOTO: 1/3      Precautions: Standard; Hypertension    Time In: 8:03 am  Time Out: 9:00 am  Total Billable Time: 57 minutes      SUBJECTIVE     Patient reports: she is still feeling well but does report that she notices she struggles more with isolated exercises with her left hip compared to her right.  .  She  N/A  compliant with home exercise program.  Response to previous treatment: good soreness  Functional change: no change    Pain: NT/10  Location: Bilateral Hips and left knee     OBJECTIVE     Objective Measures updated at progress report unless specified.     TREATMENT       CPT Intervention Duration / Intensity  6/14/2024    MT       TE  nustep -    Recumbent bike 6 minutes     LTR with feet wide for hip ROM 3 minutes    LTR on swiss ball  -      piriformis stretch -    Open book  -   NMR PPT 3 minutes    PPT with march  -      hip adduction isometric 10x/ 3 sets     Supine clamshell blue band 10x/ 3 sets    bridge 10x/ 3 sets    SLR 10x / 3sets    Supine dead bug lower extremity only 10x/ 3 set    Hamstring ball roll outs (green ball with heel dig) 10x/ 3 sets    Clamshells 10x/ 3 sets   TA Side  stepping  3 minutes green band     Shuttle squat  3 minutes     Heel taps  2 x 10 bilateral 2inch box                                           PLAN  progress as indicated, sidelying hip abduction            CPT Codes available for Billing:   (00) minutes of Manual therapy (MT) to improve pain and ROM.  (09) minutes of Therapeutic Exercise (TE) to develop strength, endurance, range of motion, and flexibility.  (33) minutes of Neuromuscular Re-Education (NMR)  to improve: Balance, Coordination, Kinesthetic, Sense, Proprioception, and Posture.  (15) minutes of Therapeutic Activities (TA) to improve functional performance.  Unattended Electrical Stimulation (ES) for muscle performance or pain modulation.  BFR: Blood flow restriction applied during exercise  NP or (-): Not Performed       PATIENT EDUCATION AND HOME EXERCISES     Home Exercises Provided and Patient Education Provided     Education provided:   - Importance of core strength    Written Home Exercises Provided:  yes . Exercises were reviewed and Mable was able to demonstrate them prior to the end of the session.  Mable demonstrated good  understanding of the education provided. See EMR under Patient Instructions for exercises provided during therapy sessions      ASSESSMENT     Progressed patient with lateral hip strengthening and endurance activities. Patient tolerated these well, but did report more lateral hip fatigue and weakness on left LOWER EXTREMITY. Also progressed patient with increased repetitions performed on supine dead bugs to continue increasing lower abdominal strength and stability. Patient would continue to benefit from further abdominal and lateral hip strengthening.    Mable Is progressing well towards her goals.   Pt prognosis is Good.     Pt will continue to benefit from skilled outpatient physical therapy to address the deficits listed in the problem list box on initial evaluation, provide pt/family education and to maximize pt's  level of independence in the home and community environment.     Pt's spiritual, cultural and educational needs considered and pt agreeable to plan of care and goals.     Anticipated barriers to physical therapy:  co-morbidities, chronicity of condition, and occupation     Goals:   Reviewed: 6/14/2024       Short Term Goals:  6 weeks Status  Date Met   PAIN: Patient will report worst pain of 5/10 in order to progress toward max functional ability and improve quality of life. [x] Progressing  [] Met  [] Not Met     FUNCTION: Patient will demonstrate improved function as indicated by a functional intake score of more than or equal to 54 out of 100 on FOTO. [x] Progressing  [] Met  [] Not Met     MOBILITY: Patient will improve Range of Motion to 50% of stated goals, listed in objective measures above, in order to progress towards independence with functional activities.  [x] Progressing  [] Met  [] Not Met     STRENGTH: Patient will improve strength to 50% of stated goals, listed in objective measures above, in order to progress towards independence with functional activities.  [x] Progressing  [] Met  [] Not Met        Long Term Goals:  12 weeks Status Date Met   PAIN: Patient will report worst pain of 3/10 in order to progress toward max functional ability and improve quality of life [x] Progressing  [] Met  [] Not Met     FUNCTION: Patient will demonstrate improved function as indicated by a functional intake score of more than or equal to 56 out of 100 on FOTO. [x] Progressing  [] Met  [] Not Met     MOBILITY: Patient will improve Range of Motion to stated goals, listed in objective measures above, in order to return to maximal functional potential and improve quality of life. [x] Progressing  [] Met  [] Not Met     STRENGTH: Patient will improve strength to stated goals, listed in objective measures above, in order to improve functional independence and quality of life. [x] Progressing  [] Met  [] Not Met      Patient will return to normal ADL's, IADL's, community involvement, recreational activities, and work-related activities with less than or equal to 3/10 pain and maximal function.  [x] Progressing  [] Met  [] Not Met          PLAN     Monitor response to today's treatment session and progress with Physical Therapy plan of care as indicated.    Plan of Care Certification: 5/17/2024 to 08/09/2024.     Outpatient Physical Therapy 2 times weekly for 12 weeks to include any combination of the following interventions: virtual visits, dry needling, modalities, electrical stimulation (IFC, Pre-Mod, Attended with Functional Dry Needling), Cervical/Lumbar Traction, Gait Training, Manual Therapy, Moist Heat/ Ice, Neuromuscular Re-ed, Patient Education, Self Care, Therapeutic Exercise, and Therapeutic Activites     June Peck, PT

## 2024-06-14 NOTE — PROGRESS NOTES
[facility]  Occupational Therapy Treatment [facility]     Date: 6/14/2024  [id number]    Therapy Diagnosis:   Encounter Diagnoses   Name Primary?    Pain Yes    Decreased range of motion     Decreased muscle strength     Decreased activities of daily living (ADL)      Physician: Jeannie Cárdenas MD    Physician Orders: Occupational Therapy to Evaluate and Treat  Medical Diagnosis:   S53.439A (ICD-10-CM) - Sprain of lateral collateral ligament of elbow   S56.519A (ICD-10-CM) - Partial tear of common extensor tendon of elbow      Surgical Procedure and Date: N/A     Evaluation Date: 4/12/2024  Insurance Authorization Period Expiration: 4/5/2024 - 4/5/2025  Plan of Care Certification Period: 4/12/2024 - 7/12/2024  Progress Note Due: 5/12/2024      Date of Return to MD: N/A  Visit # / Visits authorized: 8 / 25  FOTO: 1/3     Precautions:  Standard     Time In: 9:00  Time Out: 10:00  Total Treatment Time: 60 minutes  Total Billable Time: 60 minutes    Subjective     Patient reports: pain is trace in R elbow/forearm today.  She feels condition is improving.  she was compliant with home exercise program given last session.   Response to previous treatment: less pain  Functional change: able to engage in her normal activity at home and work, with less pain    Pain: 1/10  Location: right elbow    Objective     Objective measures updated at progress report unless specified.    Treatment     Direct Contact Modalities: Modalities such as attended electrical stimulation, iontophoresis, ultrasound to reduce pain and increase soft tissue extensibility. Mable received (10) minutes of modalities listed below after being cleared for contraindications. x = modalities performed     Direct Contact Modalities 6/14/2024    1.0 MHz, .8 W/cm2, 50% to R lat. elbow, to decrease pain & edema, increase circulation and tissue extensibility.  x 10 minutes, time equally split between  "areas           Manual Therapy Techniques: Joint mobilizations, Soft tissue Mobilization, and mobilization with movement applied to the area listed below. Mable received (20) minutes of interventions. x = intervention performed today    Manual Intervention 6/14/2024    Extensive Soft Tissue Mobilization, Myofacial Release, Manual Lymphatic Drainage, IASTM, Cupping.  x Right elbow  to increase blood flow/circulation, improve soft tissue pliability and decrease pain   Joint Mobilizations     Kinesiotaping  "Tennis elbow" taping applied to R elbow     Mobilization with movement     Scar Management  Provided silicone scar gel sheet for scar management due to raised, hypertrophic scarring on .   Patient verbalized understanding of instructions provided on wear schedule, care and precautions to monitor.   Patient was also provided with a sleeve of Tubi  size  to wear at night       Therapeutic Exercises: to develop strength, endurance, ROM, flexibility, posture and core stabilization. Mable received (30) minutes of exercises listed below. x = performed today * = level of progression of activity     Therapeutic Exercise 6/14/2024    Forearm stretch 3 ways elbow bent x 3x - 15 second hold   Forearm stretch 3 ways elbow extended x 3x - 15 second hold   Forearm stretch, elbow extended, thumb down x 3 reps, 15 second hold   UE stretches:     -Posterior capsule stretch x 3 reps, 15 sec hold each   -Triceps stretch x 3 reps, 15 sec hold each   -biceps stretch hand 2 ways x 3 reps, 15 sec hold each   's stretch w/ therapist assist x 3 reps, 15 sec hold each        Nirschl's: 4 positions     -Wrist E/F open hand x 15 reps each position   -Wrist E/F closed fist x 15 reps each position   -Wrist E/F open/close x 15 reps each position (slight increase in pain w/ elbow 0             Home Exercises and Education Provided     Education provided:   - Reviewed UE stretches  - Reiterated benefits of compression sleeve and ice " massage after resistive activities  - Ergonomics  - progress towards goals     Written Home Exercises Provided: Patient instructed to cont prior HEP.  Exercises were reviewed and Mable was able to demonstrate them prior to the end of the session. Mable demonstrated good understanding of the home exercise program provided.     See EMR under Patient Instructions for exercises provided prior visit.        Assessment     Pain continues to be minimal to trace in R lateral elbow/forearm.  Repetitive exercises added today with slight increase in pain at the R lat epi, but no greater than 2-3/10.  Recommend to continue OT per Plan of Care and progress, as tolerated.     Mable is not progressing towards her goals and there are no updates to goals at this time. Patient prognosis is fair    Patient will continue to benefit from skilled outpatient occupational therapy to address the deficits listed in the problem list on initial evaluation provide patient/family education and to maximize patient's level of independence in the home and community environment.     Patient's spiritual, cultural and educational needs considered and patient agreeable to plan of care and goals.    Anticipated barriers to occupational therapy: Pain, Overuse     Goals:     Short Term Goals:  6 weeks  Progress       Pain: Patient will demonstrate improved pain by reports of less than or equal to 3/10 worst pain on the verbal rating scale in order to progress toward maximal functional ability and improve quality of life. PC   Function: Patient will demonstrate improved function as indicated by a functional intake score of more than or equal to 45 out of 100 on FOTO. PC   Mobility: Patient will improve AROM to 50% of stated goals, listed in objective measures above, in order to progress towards independence with functional activities.  PC   Strength: Patient will improve strength to 50% of stated goals, listed in objective measures above, in order to  progress towards independence with functional activities.  PC   HEP: Patient will demonstrate independence with HEP in order to progress toward functional independence. PC      Long Term Goals:  12 weeks  Progress      Pain: Patient will demonstrate improved pain by reports of less than or equal to 2/10 worst pain on the verbal rating scale in order to progress toward maximal functional ability and improve quality of life.   PC   Function: Patient will demonstrate improved function as indicated by a functional intake score of more than or equal to 69 out of 100 on FOTO. PC   Mobility: Patient will improve AROM to stated goals, listed in objective measures above, in order to return to maximal functional potential and improve quality of life. PC   Strength: Patient will improve strength to stated goals, listed in objective measures above, in order to improve functional independence and quality of life. PC   Patient will return to normal ADL's, IADL's, community involvement, recreational activities, and work-related activities with less than or equal to 4/10 pain and maximal function.  PC      Goals Key:  PC= Progressing/Continue;       PM= Partially Met;       Goal Met= Goal Met      DC= Discontinue    Plan     Plan of Care Certification: 4/12/2024 to 7/12/2024.      Outpatient Occupational Therapy 1-2 times weekly for 12 weeks to include the following interventions:  Therapeutic Exercise, Functional Activities, Patient Education, Home Exercise Program, ADL Training, Transfer/Mobility Training, Manual Therapy, Neuromuscular Reeducation/Sensory, Electrical Stimulation/TENS/Interferential, Moist Heat/Ice/Paraffin, Orthotic Fabrication/Fit/Management, Ultrasound/Phonophoresis, and Edema Control/Fluidotherapy.      Bereket Sterling, OT  ,OTD, OTR/L

## 2024-06-21 ENCOUNTER — CLINICAL SUPPORT (OUTPATIENT)
Dept: REHABILITATION | Facility: HOSPITAL | Age: 54
End: 2024-06-21
Payer: COMMERCIAL

## 2024-06-21 DIAGNOSIS — R29.898 DECREASED STRENGTH OF LOWER EXTREMITY: ICD-10-CM

## 2024-06-21 DIAGNOSIS — R52 PAIN: Primary | ICD-10-CM

## 2024-06-21 DIAGNOSIS — M25.60 DECREASED RANGE OF MOTION: ICD-10-CM

## 2024-06-21 DIAGNOSIS — M25.651 DECREASED RANGE OF MOTION OF BOTH HIPS: Primary | ICD-10-CM

## 2024-06-21 DIAGNOSIS — Z78.9 DECREASED ACTIVITIES OF DAILY LIVING (ADL): ICD-10-CM

## 2024-06-21 DIAGNOSIS — M62.81 DECREASED MUSCLE STRENGTH: ICD-10-CM

## 2024-06-21 DIAGNOSIS — Z74.09 DECREASED FUNCTIONAL MOBILITY AND ENDURANCE: ICD-10-CM

## 2024-06-21 DIAGNOSIS — M25.652 DECREASED RANGE OF MOTION OF BOTH HIPS: Primary | ICD-10-CM

## 2024-06-21 PROCEDURE — 97110 THERAPEUTIC EXERCISES: CPT

## 2024-06-21 PROCEDURE — 97140 MANUAL THERAPY 1/> REGIONS: CPT

## 2024-06-21 PROCEDURE — 97530 THERAPEUTIC ACTIVITIES: CPT

## 2024-06-21 PROCEDURE — 97112 NEUROMUSCULAR REEDUCATION: CPT

## 2024-06-21 NOTE — PROGRESS NOTES
[facility]  Occupational Therapy Treatment Note     Date: 6/21/2024  Name: Mable Jordanden  Clinic Number: [id number]    Therapy Diagnosis:   Encounter Diagnoses   Name Primary?    Pain Yes    Decreased range of motion     Decreased muscle strength     Decreased activities of daily living (ADL)      Physician: Jeannie Cárdenas MD    Physician Orders: Occupational Therapy to Evaluate and Treat  Medical Diagnosis:   S53.439A (ICD-10-CM) - Sprain of lateral collateral ligament of elbow   S56.519A (ICD-10-CM) - Partial tear of common extensor tendon of elbow      Surgical Procedure and Date: N/A     Evaluation Date: 4/12/2024  Insurance Authorization Period Expiration: 4/5/2024 - 4/5/2025  Plan of Care Certification Period: 4/12/2024 - 7/12/2024  Progress Note Due: 5/12/2024      Date of Return to MD: N/A  Visit # / Visits authorized: 9 / 12  FOTO: 2/3  Initial=45% / Goal=69% / Current=64%     Precautions:  Standard     Time In: 9:35  Time Out: 10:30  Total Treatment Time: 55  minutes  Total Billable Time: 55  minutes    Subjective     Patient reports: pain is trace in R elbow/forearm today.  She feels condition is improving.  she was compliant with home exercise program given last session.   Response to previous treatment: less pain  Functional change: able to engage in her normal activity at home and work, with less pain    Pain: 2/10  Location: right elbow    Objective     Objective measures updated at progress report unless specified.    Treatment     Direct Contact Modalities: Modalities such as attended electrical stimulation, iontophoresis, ultrasound to reduce pain and increase soft tissue extensibility. Mable received (0) minutes of modalities listed below after being cleared for contraindications. x = modalities performed     Direct Contact Modalities 6/21/2024    1.0 MHz, .8 W/cm2, 50% to R lat. elbow, to decrease pain & edema, increase circulation and tissue extensibility.   10  "minutes, time equally split between areas           Manual Therapy Techniques: Joint mobilizations, Soft tissue Mobilization, and mobilization with movement applied to the area listed below. Mable received (25) minutes of interventions. x = intervention performed today    Manual Intervention 6/21/2024    Extensive Soft Tissue Mobilization, Myofacial Release, Manual Lymphatic Drainage, IASTM, Cupping.  x Right upper arm, elbow & forearm  to increase blood flow/circulation, improve soft tissue pliability and decrease pain   Joint Mobilizations     Kinesiotaping x "Tennis elbow" taping applied to R elbow  and biceps inhibition taping pattern with 2 "I" strips, 40% tension   Mobilization with movement     Scar Management  Provided silicone scar gel sheet for scar management due to raised, hypertrophic scarring on .   Patient verbalized understanding of instructions provided on wear schedule, care and precautions to monitor.   Patient was also provided with a sleeve of Tubi  size  to wear at night       Therapeutic Exercises: to develop strength, endurance, ROM, flexibility, posture and core stabilization. Mable received (30) minutes of exercises listed below. x = performed today * = level of progression of activity     Therapeutic Exercise 6/21/2024    Forearm stretch 3 ways elbow bent x 3x - 15 second hold   Forearm stretch 3 ways elbow extended x 3x - 15 second hold   Forearm stretch, elbow extended, thumb down x 3 reps, 15 second hold   UE stretches:     -Posterior capsule stretch x 3 reps, 15 sec hold each   -Triceps stretch x 3 reps, 15 sec hold each   -biceps stretch hand 2 ways x 3 reps, 15 sec hold each   's stretch w/ therapist assist  3 reps, 15 sec hold each   UBE-forward/backwards x 3 min each way, lvl 2.0   Dexterciser x 3 min   Nirschl's: 4 positions     -Wrist E/F open hand  15 reps each position   -Wrist E/F closed fist  15 reps each position   -Wrist E/F open/close x 15 reps each position " (slight increase in pain w/ elbow 0   -forearm sup/pron w/ bar: 2 positions x 15 reps, held at end 0# wt   -PH positions x 10 reps, setting #2, 22#   -Wrist twist: 2 positions x 10 reps each, setting 10   Smiles & frowns x 8-10 reps, yellow T-bar        Home Exercises and Education Provided     Education provided:   - Reviewed UE stretches  - Reiterated benefits of compression sleeve and ice massage after resistive activities  - Ergonomics  - progress towards goals     Written Home Exercises Provided: Patient instructed to cont prior HEP.  Exercises were reviewed and Mable was able to demonstrate them prior to the end of the session. Mable demonstrated good understanding of the home exercise program provided.     See EMR under Patient Instructions for exercises provided prior visit.        Assessment     Pain remains minimal in R lateral elbow/forearm.   Functional improvement noted in FOTO scores today.  Patient continues to have myofascial tightness and a muscle spasm in her R upper arm, but is decreasing in size and is less painful.      Mable is not progressing towards her goals and there are no updates to goals at this time. Patient prognosis is fair    Patient will continue to benefit from skilled outpatient occupational therapy to address the deficits listed in the problem list on initial evaluation provide patient/family education and to maximize patient's level of independence in the home and community environment.     Patient's spiritual, cultural and educational needs considered and patient agreeable to plan of care and goals.    Anticipated barriers to occupational therapy: Pain, Overuse     Goals:     Short Term Goals:  6 weeks  Progress       Pain: Patient will demonstrate improved pain by reports of less than or equal to 3/10 worst pain on the verbal rating scale in order to progress toward maximal functional ability and improve quality of life. PC   Function: Patient will demonstrate improved  function as indicated by a functional intake score of more than or equal to 45 out of 100 on FOTO. PC   Mobility: Patient will improve AROM to 50% of stated goals, listed in objective measures above, in order to progress towards independence with functional activities.  PC   Strength: Patient will improve strength to 50% of stated goals, listed in objective measures above, in order to progress towards independence with functional activities.  PC   HEP: Patient will demonstrate independence with HEP in order to progress toward functional independence. PC      Long Term Goals:  12 weeks  Progress      Pain: Patient will demonstrate improved pain by reports of less than or equal to 2/10 worst pain on the verbal rating scale in order to progress toward maximal functional ability and improve quality of life.   PC   Function: Patient will demonstrate improved function as indicated by a functional intake score of more than or equal to 69 out of 100 on FOTO. PC   Mobility: Patient will improve AROM to stated goals, listed in objective measures above, in order to return to maximal functional potential and improve quality of life. PC   Strength: Patient will improve strength to stated goals, listed in objective measures above, in order to improve functional independence and quality of life. PC   Patient will return to normal ADL's, IADL's, community involvement, recreational activities, and work-related activities with less than or equal to 4/10 pain and maximal function.  PC      Goals Key:  PC= Progressing/Continue;       PM= Partially Met;       Goal Met= Goal Met      DC= Discontinue    Plan     Plan of Care Certification: 4/12/2024 to 7/12/2024.      Outpatient Occupational Therapy 1-2 times weekly for 12 weeks to include the following interventions:  Therapeutic Exercise, Functional Activities, Patient Education, Home Exercise Program, ADL Training, Transfer/Mobility Training, Manual Therapy, Neuromuscular  Reeducation/Sensory, Electrical Stimulation/TENS/Interferential, Moist Heat/Ice/Paraffin, Orthotic Fabrication/Fit/Management, Ultrasound/Phonophoresis, and Edema Control/Fluidotherapy.      Bereket Sterling, OT

## 2024-06-21 NOTE — PROGRESS NOTES
[facility]   Physical Therapy Treatment Note + Progress Report     Name: Mable Redding  Clinic Number: [id number]    Therapy Diagnosis:   Encounter Diagnoses   Name Primary?    Decreased range of motion of both hips Yes    Decreased strength of lower extremity     Decreased functional mobility and endurance      Physician: Daniel Srinivasan MD    Visit Date: 6/21/2024    Physician Orders: Physical Therapy Evaluation and Treat  Medical Diagnosis from Referral:   M25.562,G89.29 (ICD-10-CM) - Chronic pain of left knee   M25.551 (ICD-10-CM) - Right hip pain   M67.951 (ICD-10-CM) - Tendinopathy of right gluteal region   Evaluation Date: 5/17/2024  Authorization Period Expiration: 12/31/2024  Plan of Care Expiration: 08/09/2024  Progress Note Due: 07/21/2024  Visit # / Visits authorized: 4/12  FOTO: 2/3      Precautions: Standard; Hypertension    Time In: 8:00 am  Time Out: 8:55 am  Total Billable Time: 55 minutes      SUBJECTIVE     Patient reports: she feels like her hips are getting stronger since PHYSICAL THERAPY, but patient states she still has constant pain and does not feel like it will ever bee 100% better.  .  She  N/A  compliant with home exercise program.  Response to previous treatment: good soreness  Functional change: see FOTO    Pain: 2/10  Location: Bilateral Hips and left knee     OBJECTIVE     Objective Measures updated at progress report unless specified.     *dnotes change since initial evaluation, re-tested on 06/21/2024  STRENGTH:     L/E MMT Right    Left Pain/Dysfunction with Movement Goal   Hip Flexion  5/5 5/5   4+/5 Bilateral   Hip Extension  4/5* 4/5* Pain in right only  4+/5 Bilateral   Hip Abduction  4-/5* 4-/5*  4+/5 Bilateral   Knee Extension 5/5 4/5   5/5 Bilateral   Knee Flexion 5/5 5/5   5/5 Bilateral   Ankle Dorsiflexion 5/5 5/5   5/5 Bilateral      FUNCTION:      CMS Impairment/Limitation/Restriction for FOTO Hip Survey     Therapist reviewed FOTO  scores for Mable on 5/17/2024.   FOTO documents entered into [employer] - see Media section.     Intake Score: 49*         TREATMENT       CPT Intervention Duration / Intensity  6/21/2024    MT       TE Nu-step -    Recumbent bike 6 minutes     LTR with feet wide for hip ROM 3 minutes    Objectives Re-assessed 5 minutes    LTR on swiss ball  -      piriformis stretch -    Open book  -   NMR PPT 3 minutes    PPT with march  -      hip adduction isometric 10x/ 3 sets     Supine clamshell blue band 10x/ 3 sets    Bridge 10x/ 3 sets    Supine dead bug upper and lower extremity 10x/ 2 set    Hamstring ball roll outs (green ball with heel dig) 10x/ 3 sets    Clamshells 10x/ 3 sets   TA Side stepping 3 minutes green band     Shuttle squat 3 minutes    Straight Leg Raise 3 x 10 bilateral     Heel taps -                                           PLAN  progress as indicated, sidelying hip abduction            CPT Codes available for Billing:   (00) minutes of Manual therapy (MT) to improve pain and ROM.  (14) minutes of Therapeutic Exercise (TE) to develop strength, endurance, range of motion, and flexibility.  (32) minutes of Neuromuscular Re-Education (NMR)  to improve: Balance, Coordination, Kinesthetic, Sense, Proprioception, and Posture.  (09) minutes of Therapeutic Activities (TA) to improve functional performance.  Unattended Electrical Stimulation (ES) for muscle performance or pain modulation.  BFR: Blood flow restriction applied during exercise  NP or (-): Not Performed       PATIENT EDUCATION AND HOME EXERCISES     Home Exercises Provided and Patient Education Provided     Education provided:   - Importance of core strength    Written Home Exercises Provided:  yes . Exercises were reviewed and Mable was able to demonstrate them prior to the end of the session.  Mable demonstrated good  understanding of the education provided. See EMR under Patient Instructions for exercises provided during therapy  sessions      ASSESSMENT     Since initial evaluation patient demonstrates moderate improvements in bilateral hip extension and bilateral hip abduction strength. Despite patient reporting feeling minimal progress in pain, patient has decreased overall functional limitations per FOTO scores today. Patient still presents with pain and strength deficits, and would continue to benefit from skilled therapy to continue addressing these deficits to allow patient to return to ACTIVITIES OF DAILY LIVING with less pain and dysfunction.    Mable Is progressing well towards her goals.   Pt prognosis is Good.     Pt will continue to benefit from skilled outpatient physical therapy to address the deficits listed in the problem list box on initial evaluation, provide pt/family education and to maximize pt's level of independence in the home and community environment.     Pt's spiritual, cultural and educational needs considered and pt agreeable to plan of care and goals.     Anticipated barriers to physical therapy:  co-morbidities, chronicity of condition, and occupation     Goals:   Reviewed: 6/21/2024       Short Term Goals:  6 weeks Status  Date Met   PAIN: Patient will report worst pain of 5/10 in order to progress toward max functional ability and improve quality of life. [] Progressing  [x] Met  [] Not Met 06/21/2024    FUNCTION: Patient will demonstrate improved function as indicated by a functional intake score of more than or equal to 54 out of 100 on FOTO. [x] Progressing  [] Met  [] Not Met     MOBILITY: Patient will improve Range of Motion to 50% of stated goals, listed in objective measures above, in order to progress towards independence with functional activities.  [x] Progressing  [] Met  [] Not Met     STRENGTH: Patient will improve strength to 50% of stated goals, listed in objective measures above, in order to progress towards independence with functional activities.  [] Progressing  [x] Met  [] Not Met  06/21/2024       Long Term Goals:  12 weeks Status Date Met   PAIN: Patient will report worst pain of 3/10 in order to progress toward max functional ability and improve quality of life [x] Progressing  [] Met  [] Not Met     FUNCTION: Patient will demonstrate improved function as indicated by a functional intake score of more than or equal to 56 out of 100 on FOTO. [x] Progressing  [] Met  [] Not Met     MOBILITY: Patient will improve Range of Motion to stated goals, listed in objective measures above, in order to return to maximal functional potential and improve quality of life. [x] Progressing  [] Met  [] Not Met     STRENGTH: Patient will improve strength to stated goals, listed in objective measures above, in order to improve functional independence and quality of life. [x] Progressing  [] Met  [] Not Met     Patient will return to normal ADL's, IADL's, community involvement, recreational activities, and work-related activities with less than or equal to 3/10 pain and maximal function.  [x] Progressing  [] Met  [] Not Met          PLAN     Monitor response to today's treatment session and progress with Physical Therapy plan of care as indicated.    Plan of Care Certification: 5/17/2024 to 08/09/2024.     Outpatient Physical Therapy 2 times weekly for 12 weeks to include any combination of the following interventions: virtual visits, dry needling, modalities, electrical stimulation (IFC, Pre-Mod, Attended with Functional Dry Needling), Cervical/Lumbar Traction, Gait Training, Manual Therapy, Moist Heat/ Ice, Neuromuscular Re-ed, Patient Education, Self Care, Therapeutic Exercise, and Therapeutic Activites     June Peck, PT

## 2024-06-21 NOTE — PLAN OF CARE
[facility]   Physical Therapy Treatment Note + Progress Report     Name: Mable Redding  Clinic Number: [id number]    Therapy Diagnosis:   Encounter Diagnoses   Name Primary?    Decreased range of motion of both hips Yes    Decreased strength of lower extremity     Decreased functional mobility and endurance      Physician: Daniel Srinivasan MD    Visit Date: 6/21/2024    Physician Orders: Physical Therapy Evaluation and Treat  Medical Diagnosis from Referral:   M25.562,G89.29 (ICD-10-CM) - Chronic pain of left knee   M25.551 (ICD-10-CM) - Right hip pain   M67.951 (ICD-10-CM) - Tendinopathy of right gluteal region   Evaluation Date: 5/17/2024  Authorization Period Expiration: 12/31/2024  Plan of Care Expiration: 08/09/2024  Progress Note Due: 07/21/2024  Visit # / Visits authorized: 4/12  FOTO: 2/3      Precautions: Standard; Hypertension    Time In: 8:00 am  Time Out: 8:55 am  Total Billable Time: 55 minutes      SUBJECTIVE     Patient reports: she feels like her hips are getting stronger since PHYSICAL THERAPY, but patient states she still has constant pain and does not feel like it will ever bee 100% better.  .  She N/A compliant with home exercise program.  Response to previous treatment: good soreness  Functional change: see FOTO    Pain: 2/10  Location: Bilateral Hips and left knee     OBJECTIVE     Objective Measures updated at progress report unless specified.     *dnotes change since initial evaluation, re-tested on 06/21/2024  STRENGTH:     L/E MMT Right    Left Pain/Dysfunction with Movement Goal   Hip Flexion  5/5 5/5   4+/5 Bilateral   Hip Extension  4/5* 4/5* Pain in right only  4+/5 Bilateral   Hip Abduction  4-/5* 4-/5*  4+/5 Bilateral   Knee Extension 5/5 4/5   5/5 Bilateral   Knee Flexion 5/5 5/5   5/5 Bilateral   Ankle Dorsiflexion 5/5 5/5   5/5 Bilateral      FUNCTION:      CMS Impairment/Limitation/Restriction for FOTO Hip Survey     Therapist reviewed FOTO  scores for Mable on 5/17/2024.   FOTO documents entered into "[employer]" - see Media section.     Intake Score: 49*         TREATMENT       CPT Intervention Duration / Intensity  6/21/2024    MT       TE Nu-step -    Recumbent bike 6 minutes     LTR with feet wide for hip ROM 3 minutes    Objectives Re-assessed 5 minutes    LTR on swiss ball  -      piriformis stretch -    Open book  -   NMR PPT 3 minutes    PPT with march  -      hip adduction isometric 10x/ 3 sets     Supine clamshell blue band 10x/ 3 sets    Bridge 10x/ 3 sets    Supine dead bug upper and lower extremity 10x/ 2 set    Hamstring ball roll outs (green ball with heel dig) 10x/ 3 sets    Clamshells 10x/ 3 sets   TA Side stepping 3 minutes green band     Shuttle squat 3 minutes    Straight Leg Raise 3 x 10 bilateral     Heel taps -                                           PLAN  progress as indicated, sidelying hip abduction            CPT Codes available for Billing:   (00) minutes of Manual therapy (MT) to improve pain and ROM.  (14) minutes of Therapeutic Exercise (TE) to develop strength, endurance, range of motion, and flexibility.  (32) minutes of Neuromuscular Re-Education (NMR)  to improve: Balance, Coordination, Kinesthetic, Sense, Proprioception, and Posture.  (09) minutes of Therapeutic Activities (TA) to improve functional performance.  Unattended Electrical Stimulation (ES) for muscle performance or pain modulation.  BFR: Blood flow restriction applied during exercise  NP or (-): Not Performed       PATIENT EDUCATION AND HOME EXERCISES     Home Exercises Provided and Patient Education Provided     Education provided:   - Importance of core strength    Written Home Exercises Provided: yes. Exercises were reviewed and Mable was able to demonstrate them prior to the end of the session.  Mable demonstrated good  understanding of the education provided. See EMR under Patient Instructions for exercises provided during therapy  sessions      ASSESSMENT     Since initial evaluation patient demonstrates moderate improvements in bilateral hip extension and bilateral hip abduction strength. Despite patient reporting feeling minimal progress in pain, patient has decreased overall functional limitations per FOTO scores today. Patient still presents with pain and strength deficits, and would continue to benefit from skilled therapy to continue addressing these deficits to allow patient to return to ACTIVITIES OF DAILY LIVING with less pain and dysfunction.    Mable Is progressing well towards her goals.   Pt prognosis is Good.     Pt will continue to benefit from skilled outpatient physical therapy to address the deficits listed in the problem list box on initial evaluation, provide pt/family education and to maximize pt's level of independence in the home and community environment.     Pt's spiritual, cultural and educational needs considered and pt agreeable to plan of care and goals.     Anticipated barriers to physical therapy:  co-morbidities, chronicity of condition, and occupation     Goals:   Reviewed: 6/21/2024       Short Term Goals:  6 weeks Status  Date Met   PAIN: Patient will report worst pain of 5/10 in order to progress toward max functional ability and improve quality of life. [] Progressing  [x] Met  [] Not Met 06/21/2024    FUNCTION: Patient will demonstrate improved function as indicated by a functional intake score of more than or equal to 54 out of 100 on FOTO. [x] Progressing  [] Met  [] Not Met     MOBILITY: Patient will improve Range of Motion to 50% of stated goals, listed in objective measures above, in order to progress towards independence with functional activities.  [x] Progressing  [] Met  [] Not Met     STRENGTH: Patient will improve strength to 50% of stated goals, listed in objective measures above, in order to progress towards independence with functional activities.  [] Progressing  [x] Met  [] Not Met  06/21/2024       Long Term Goals:  12 weeks Status Date Met   PAIN: Patient will report worst pain of 3/10 in order to progress toward max functional ability and improve quality of life [x] Progressing  [] Met  [] Not Met     FUNCTION: Patient will demonstrate improved function as indicated by a functional intake score of more than or equal to 56 out of 100 on FOTO. [x] Progressing  [] Met  [] Not Met     MOBILITY: Patient will improve Range of Motion to stated goals, listed in objective measures above, in order to return to maximal functional potential and improve quality of life. [x] Progressing  [] Met  [] Not Met     STRENGTH: Patient will improve strength to stated goals, listed in objective measures above, in order to improve functional independence and quality of life. [x] Progressing  [] Met  [] Not Met     Patient will return to normal ADL's, IADL's, community involvement, recreational activities, and work-related activities with less than or equal to 3/10 pain and maximal function.  [x] Progressing  [] Met  [] Not Met          PLAN     Monitor response to today's treatment session and progress with Physical Therapy plan of care as indicated.    Plan of Care Certification: 5/17/2024 to 08/09/2024.     Outpatient Physical Therapy 2 times weekly for 12 weeks to include any combination of the following interventions: virtual visits, dry needling, modalities, electrical stimulation (IFC, Pre-Mod, Attended with Functional Dry Needling), Cervical/Lumbar Traction, Gait Training, Manual Therapy, Moist Heat/ Ice, Neuromuscular Re-ed, Patient Education, Self Care, Therapeutic Exercise, and Therapeutic Activites     June Peck, PT

## 2024-06-28 ENCOUNTER — CLINICAL SUPPORT (OUTPATIENT)
Dept: REHABILITATION | Facility: HOSPITAL | Age: 54
End: 2024-06-28
Payer: COMMERCIAL

## 2024-06-28 DIAGNOSIS — M25.652 DECREASED RANGE OF MOTION OF BOTH HIPS: Primary | ICD-10-CM

## 2024-06-28 DIAGNOSIS — R29.898 DECREASED STRENGTH OF LOWER EXTREMITY: ICD-10-CM

## 2024-06-28 DIAGNOSIS — M25.651 DECREASED RANGE OF MOTION OF BOTH HIPS: Primary | ICD-10-CM

## 2024-06-28 DIAGNOSIS — Z74.09 DECREASED FUNCTIONAL MOBILITY AND ENDURANCE: ICD-10-CM

## 2024-06-28 PROCEDURE — 97112 NEUROMUSCULAR REEDUCATION: CPT

## 2024-06-28 PROCEDURE — 97530 THERAPEUTIC ACTIVITIES: CPT

## 2024-06-28 PROCEDURE — 97110 THERAPEUTIC EXERCISES: CPT

## 2024-06-28 NOTE — PROGRESS NOTES
[facility]   Physical Therapy Treatment Note      Name: Mable Redding  Clinic Number: [id number]    Therapy Diagnosis:   Encounter Diagnoses   Name Primary?    Decreased range of motion of both hips Yes    Decreased strength of lower extremity     Decreased functional mobility and endurance      Physician: Daniel Srinivasan MD    Visit Date: 6/28/2024    Physician Orders: Physical Therapy Evaluation and Treat  Medical Diagnosis from Referral:   M25.562,G89.29 (ICD-10-CM) - Chronic pain of left knee   M25.551 (ICD-10-CM) - Right hip pain   M67.951 (ICD-10-CM) - Tendinopathy of right gluteal region   Evaluation Date: 5/17/2024  Authorization Period Expiration: 12/31/2024  Plan of Care Expiration: 08/09/2024  Progress Note Due: 07/21/2024  Visit # / Visits authorized: 5/12  FOTO: 2/3      Precautions: Standard; Hypertension    Time In: 8:05 am  Time Out: 9:00 am  Total Billable Time: 55 minutes      SUBJECTIVE     Patient reports: she is having a better day today, but reports anytime she lies on her hips or does repetitive motions they will start to bother her.  .  She  N/A  compliant with home exercise program.  Response to previous treatment: good soreness  Functional change: see FOTO    Pain: 2/10  Location: Bilateral Hips and left knee     OBJECTIVE     Objective Measures updated at progress report unless specified.      TREATMENT       CPT Intervention Duration / Intensity  6/28/2024    MT       TE Nu-step -    Recumbent bike 6 minutes     LTR with feet wide for hip ROM 3 minutes    Objectives Re-assessed -    LTR on swiss ball  -      piriformis stretch -    Open book  -   NMR PPT 3 minutes    PPT with march  -      hip adduction isometric 10x/ 3 sets     Supine clamshell blue band -    Bridge 10x/ 3 sets with ball    Supine dead bug upper and lower extremity 10x/ 3 set    Hamstring ball roll outs (green ball with heel dig) -    [facility] -   TA Side stepping 3 minutes green  band     Shuttle squat 3 minutes 6 bands    Straight Leg Raise 3 x 10 bilateral     Heel taps 4 inch box 3 x10     Hip 3 ways 1 x 10 each red band                                   PLAN  progress as indicated, sidelying hip abduction         CPT Codes available for Billing:   (00) minutes of Manual therapy (MT) to improve pain and ROM.  (14) minutes of Therapeutic Exercise (TE) to develop strength, endurance, range of motion, and flexibility.  (19) minutes of Neuromuscular Re-Education (NMR)  to improve: Balance, Coordination, Kinesthetic, Sense, Proprioception, and Posture.  (27) minutes of Therapeutic Activities (TA) to improve functional performance.  Unattended Electrical Stimulation (ES) for muscle performance or pain modulation.  BFR: Blood flow restriction applied during exercise  NP or (-): Not Performed       PATIENT EDUCATION AND HOME EXERCISES     Home Exercises Provided and Patient Education Provided     Education provided:   - Importance of core strength    Written Home Exercises Provided:  yes . Exercises were reviewed and Mable was able to demonstrate them prior to the end of the session.  Mable demonstrated good  understanding of the education provided. See EMR under Patient Instructions for exercises provided during therapy sessions      ASSESSMENT     Progressed patient with standing hip strengthening activities as well as progressed patient with increased repetitions performed on supine dead bugs. Patient did report some lower back discomfort at end, and discussed with therapist importance of maintaining core stability when exercises get fatiguing to decrease this. Patient verbalized understanding.     Mable Is progressing well towards her goals.   Pt prognosis is Good.     Pt will continue to benefit from skilled outpatient physical therapy to address the deficits listed in the problem list box on initial evaluation, provide pt/family education and to maximize pt's level of independence in the  home and community environment.     Pt's spiritual, cultural and educational needs considered and pt agreeable to plan of care and goals.     Anticipated barriers to physical therapy:  co-morbidities, chronicity of condition, and occupation     Goals:   Reviewed: 6/28/2024       Short Term Goals:  6 weeks Status  Date Met   PAIN: Patient will report worst pain of 5/10 in order to progress toward max functional ability and improve quality of life. [] Progressing  [x] Met  [] Not Met 06/21/2024    FUNCTION: Patient will demonstrate improved function as indicated by a functional intake score of more than or equal to 54 out of 100 on FOTO. [x] Progressing  [] Met  [] Not Met     MOBILITY: Patient will improve Range of Motion to 50% of stated goals, listed in objective measures above, in order to progress towards independence with functional activities.  [x] Progressing  [] Met  [] Not Met     STRENGTH: Patient will improve strength to 50% of stated goals, listed in objective measures above, in order to progress towards independence with functional activities.  [] Progressing  [x] Met  [] Not Met 06/21/2024       Long Term Goals:  12 weeks Status Date Met   PAIN: Patient will report worst pain of 3/10 in order to progress toward max functional ability and improve quality of life [x] Progressing  [] Met  [] Not Met     FUNCTION: Patient will demonstrate improved function as indicated by a functional intake score of more than or equal to 56 out of 100 on FOTO. [x] Progressing  [] Met  [] Not Met     MOBILITY: Patient will improve Range of Motion to stated goals, listed in objective measures above, in order to return to maximal functional potential and improve quality of life. [x] Progressing  [] Met  [] Not Met     STRENGTH: Patient will improve strength to stated goals, listed in objective measures above, in order to improve functional independence and quality of life. [x] Progressing  [] Met  [] Not Met     Patient will  return to normal ADL's, IADL's, community involvement, recreational activities, and work-related activities with less than or equal to 3/10 pain and maximal function.  [x] Progressing  [] Met  [] Not Met          PLAN     Monitor response to today's treatment session and progress with Physical Therapy plan of care as indicated.    Plan of Care Certification: 5/17/2024 to 08/09/2024.     Outpatient Physical Therapy 2 times weekly for 12 weeks to include any combination of the following interventions: virtual visits, dry needling, modalities, electrical stimulation (IFC, Pre-Mod, Attended with Functional Dry Needling), Cervical/Lumbar Traction, Gait Training, Manual Therapy, Moist Heat/ Ice, Neuromuscular Re-ed, Patient Education, Self Care, Therapeutic Exercise, and Therapeutic Activites     June Peck, PT

## 2024-07-05 ENCOUNTER — CLINICAL SUPPORT (OUTPATIENT)
Dept: REHABILITATION | Facility: HOSPITAL | Age: 54
End: 2024-07-05
Payer: COMMERCIAL

## 2024-07-05 DIAGNOSIS — Z74.09 DECREASED FUNCTIONAL MOBILITY AND ENDURANCE: ICD-10-CM

## 2024-07-05 DIAGNOSIS — Z78.9 DECREASED ACTIVITIES OF DAILY LIVING (ADL): ICD-10-CM

## 2024-07-05 DIAGNOSIS — R52 PAIN: Primary | ICD-10-CM

## 2024-07-05 DIAGNOSIS — M25.60 DECREASED RANGE OF MOTION: ICD-10-CM

## 2024-07-05 DIAGNOSIS — M25.652 DECREASED RANGE OF MOTION OF BOTH HIPS: Primary | ICD-10-CM

## 2024-07-05 DIAGNOSIS — M62.81 DECREASED MUSCLE STRENGTH: ICD-10-CM

## 2024-07-05 DIAGNOSIS — M25.651 DECREASED RANGE OF MOTION OF BOTH HIPS: Primary | ICD-10-CM

## 2024-07-05 DIAGNOSIS — R29.898 DECREASED STRENGTH OF LOWER EXTREMITY: ICD-10-CM

## 2024-07-05 PROCEDURE — 97110 THERAPEUTIC EXERCISES: CPT

## 2024-07-05 PROCEDURE — 97035 APP MDLTY 1+ULTRASOUND EA 15: CPT

## 2024-07-05 PROCEDURE — 97112 NEUROMUSCULAR REEDUCATION: CPT

## 2024-07-05 PROCEDURE — 97530 THERAPEUTIC ACTIVITIES: CPT

## 2024-07-05 PROCEDURE — 97140 MANUAL THERAPY 1/> REGIONS: CPT

## 2024-07-05 NOTE — PROGRESS NOTES
[facility]  Occupational Therapy Treatment Note     Date: 7/5/2024  Name: Mable Jordanden  Clinic Number: [id number]    Therapy Diagnosis:   Encounter Diagnoses   Name Primary?    Pain Yes    Decreased range of motion     Decreased muscle strength     Decreased activities of daily living (ADL)      Physician: Jeannie Cárdenas MD    Physician Orders: Occupational Therapy to Evaluate and Treat  Medical Diagnosis:   S53.439A (ICD-10-CM) - Sprain of lateral collateral ligament of elbow   S56.519A (ICD-10-CM) - Partial tear of common extensor tendon of elbow      Surgical Procedure and Date: N/A     Evaluation Date: 4/12/2024  Insurance Authorization Period Expiration: 4/5/2024 - 4/5/2025  Plan of Care Certification Period: 4/12/2024 - 7/12/2024  Progress Note Due: 5/12/2024      Date of Return to MD: N/A  Visit # / Visits authorized: 9 / 12  FOTO: 2/3  Initial=45% / Goal=69% / Current=64%     Precautions:  Standard     Time In: 9:35  Time Out: 10:15  Total Treatment Time: 40  minutes  Total Billable Time: 40  minutes    Subjective     Patient reports: pain is trace in R elbow/forearm today.  She feels condition is improving. She is unable  to complete a full session of OT today due to work schedule conflicts.   she was compliant with home exercise program given last session.   Response to previous treatment: less pain  Functional change: able to engage in her normal activity at home and work, with less pain    Pain: 2/10  Location: right elbow    Objective     Objective measures updated at progress report unless specified.    Treatment     Direct Contact Modalities: Modalities such as attended electrical stimulation, iontophoresis, ultrasound to reduce pain and increase soft tissue extensibility. Mable received (10) minutes of modalities listed below after being cleared for contraindications. x = modalities performed     Direct Contact Modalities 7/5/2024    1.0 MHz, 1.0 W/cm2, continuous to  "R lat. elbow, to decrease pain & edema, increase circulation and tissue extensibility.   10 minutes, time equally split between areas           Manual Therapy Techniques: Joint mobilizations, Soft tissue Mobilization, and mobilization with movement applied to the area listed below. Mable received (15) minutes of interventions. x = intervention performed today    Manual Intervention 7/5/2024    Extensive Soft Tissue Mobilization, Myofacial Release, Manual Lymphatic Drainage, IASTM, Cupping.  x Right upper arm, elbow & forearm  to increase blood flow/circulation, improve soft tissue pliability and decrease pain   Joint Mobilizations     Kinesiotaping x "Tennis elbow" taping applied to R elbow  and biceps inhibition taping pattern with 2 "I" strips, 40% tension   Mobilization with movement     Scar Management  Provided silicone scar gel sheet for scar management due to raised, hypertrophic scarring on .   Patient verbalized understanding of instructions provided on wear schedule, care and precautions to monitor.   Patient was also provided with a sleeve of Tubi  size  to wear at night       Therapeutic Exercises: to develop strength, endurance, ROM, flexibility, posture and core stabilization. Mable received (15) minutes of exercises listed below. x = performed today * = level of progression of activity     Therapeutic Exercise 7/5/2024    Forearm stretch 3 ways elbow bent x 3x - 15 second hold   Forearm stretch 3 ways elbow extended x 3x - 15 second hold   Forearm stretch, elbow extended, thumb down  3 reps, 15 second hold   Bicep curls 3 ways un weighted  1 set of 10   UE stretches:     -Posterior capsule stretch x 3 reps, 15 sec hold each   -Triceps stretch x 3 reps, 15 sec hold each   -biceps stretch hand 2 ways x 3 reps, 15 sec hold each   's stretch w/ therapist assist  3 reps, 15 sec hold each   UBE-forward/backwards x 3 min each way, lvl 2.0   Dexterciser  3 min   Nirschl's: 4 positions     -Wrist " E/F open hand  15 reps each position   -Wrist E/F closed fist  15 reps each position   -Wrist E/F open/close  15 reps each position (slight increase in pain w/ elbow 0   -forearm sup/pron w/ bar: 2 positions  15 reps, held at end 0# wt   -PH positions  10 reps, setting #2, 22#   -Wrist twist: 2 positions  10 reps each, setting 10   Smiles & frowns  8-10 reps, yellow T-bar        Home Exercises and Education Provided     Education provided:   - Reviewed UE stretches  - Reiterated benefits of compression sleeve and ice massage after resistive activities  - Ergonomics traaining  - progress towards goals     Written Home Exercises Provided: Patient instructed to cont prior HEP.  Exercises were reviewed and Mable was able to demonstrate them prior to the end of the session. Mable demonstrated good understanding of the home exercise program provided.     See EMR under Patient Instructions for exercises provided prior visit.        Assessment   To trace   Treatment shortened today due to patient's work schedule conflicts.  Pain remains minimal in R lateral elbow/forearm.   Myofascial tightness is minimal as well in the R upper arm/elbow/forearm.        Mable is not progressing towards her goals and there are no updates to goals at this time. Patient prognosis is fair    Patient will continue to benefit from skilled outpatient occupational therapy to address the deficits listed in the problem list on initial evaluation provide patient/family education and to maximize patient's level of independence in the home and community environment.     Patient's spiritual, cultural and educational needs considered and patient agreeable to plan of care and goals.    Anticipated barriers to occupational therapy: Pain, Overuse     Goals:     Short Term Goals:  6 weeks  Progress       Pain: Patient will demonstrate improved pain by reports of less than or equal to 3/10 worst pain on the verbal rating scale in order to progress  toward maximal functional ability and improve quality of life. PC   Function: Patient will demonstrate improved function as indicated by a functional intake score of more than or equal to 45 out of 100 on FOTO. PC   Mobility: Patient will improve AROM to 50% of stated goals, listed in objective measures above, in order to progress towards independence with functional activities.  PC   Strength: Patient will improve strength to 50% of stated goals, listed in objective measures above, in order to progress towards independence with functional activities.  PC   HEP: Patient will demonstrate independence with HEP in order to progress toward functional independence. PC      Long Term Goals:  12 weeks  Progress      Pain: Patient will demonstrate improved pain by reports of less than or equal to 2/10 worst pain on the verbal rating scale in order to progress toward maximal functional ability and improve quality of life.   PC   Function: Patient will demonstrate improved function as indicated by a functional intake score of more than or equal to 69 out of 100 on FOTO. PC   Mobility: Patient will improve AROM to stated goals, listed in objective measures above, in order to return to maximal functional potential and improve quality of life. PC   Strength: Patient will improve strength to stated goals, listed in objective measures above, in order to improve functional independence and quality of life. PC   Patient will return to normal ADL's, IADL's, community involvement, recreational activities, and work-related activities with less than or equal to 4/10 pain and maximal function.  PC      Goals Key:  PC= Progressing/Continue;       PM= Partially Met;       Goal Met= Goal Met      DC= Discontinue    Plan     Plan of Care Certification: 4/12/2024 to 7/12/2024.      Outpatient Occupational Therapy 1-2 times weekly for 12 weeks to include the following interventions:  Therapeutic Exercise, Functional Activities, Patient  Education, Home Exercise Program, ADL Training, Transfer/Mobility Training, Manual Therapy, Neuromuscular Reeducation/Sensory, Electrical Stimulation/TENS/Interferential, Moist Heat/Ice/Paraffin, Orthotic Fabrication/Fit/Management, Ultrasound/Phonophoresis, and Edema Control/Fluidotherapy.      Bereket Sterling, OT

## 2024-07-05 NOTE — PROGRESS NOTES
[facility]   Physical Therapy Treatment Note      Name: Mable Redding  Clinic Number: [id number]    Therapy Diagnosis:   Encounter Diagnoses   Name Primary?    Decreased range of motion of both hips Yes    Decreased strength of lower extremity     Decreased functional mobility and endurance        Physician: Daniel Srinivasan MD    Visit Date: 7/5/2024    Physician Orders: Physical Therapy Evaluation and Treat  Medical Diagnosis from Referral:   M25.562,G89.29 (ICD-10-CM) - Chronic pain of left knee   M25.551 (ICD-10-CM) - Right hip pain   M67.951 (ICD-10-CM) - Tendinopathy of right gluteal region   Evaluation Date: 5/17/2024  Authorization Period Expiration: 12/31/2024  Plan of Care Expiration: 08/09/2024  Progress Note Due: 07/21/2024  Visit # / Visits authorized: 6/12  FOTO: 2/3      Precautions: Standard; Hypertension    Time In: 8:25 am  Time Out: 9:22 am  Total Billable Time: 55 minutes      SUBJECTIVE     Patient reports: she is not currently having any pain still with pain durng work - standing for prolonged periods of time and when sleeping. Suggested trying pillow between knees and lower legs - supporting upper body as well to prevent twisting forward.  .  She  N/A  compliant with home exercise program.  Response to previous treatment: good soreness  Functional change: see FOTO    Pain: 2/10  Location: Bilateral Hips and left knee     OBJECTIVE     Objective Measures updated at progress report unless specified.      TREATMENT       CPT Intervention Duration / Intensity  7/5/2024    MT       TE Nu-step -    Recumbent bike 8 minutes     LTR with feet wide for hip ROM 3 minutes    Objectives Re-assessed -    LTR on swiss ball  -      piriformis stretch -    Open book  -   NMR PPT 3 minutes    PPT with march  -      hip adduction isometric 10x/ 3 sets     Supine clamshell blue band -    Bridge 10x/ 3 sets with ball    Supine dead bug upper and lower extremity 5x each/ 6  sets - with PPT    Hamstring ball roll outs (green ball with heel dig) -    Jalil -   TA Side stepping 3 minutes green band     Shuttle squat 4 minutes 6 bands    Straight Leg Raise 3 x 10 bilateral     Heel taps 4 inch box 3 x10     Hip 3 ways 1 x 10 each red band                                   PLAN  progress as indicated, sidelying hip abduction         CPT Codes available for Billing:   (00) minutes of Manual therapy (MT) to improve pain and ROM.  (11) minutes of Therapeutic Exercise (TE) to develop strength, endurance, range of motion, and flexibility.  (18) minutes of Neuromuscular Re-Education (NMR)  to improve: Balance, Coordination, Kinesthetic, Sense, Proprioception, and Posture.  (26) minutes of Therapeutic Activities (TA) to improve functional performance.  Unattended Electrical Stimulation (ES) for muscle performance or pain modulation.  BFR: Blood flow restriction applied during exercise  NP or (-): Not Performed       PATIENT EDUCATION AND HOME EXERCISES     Home Exercises Provided and Patient Education Provided     Education provided:   - Importance of core strength    Written Home Exercises Provided:  patient instructed to continue with prior issued home exercise program  . Exercises were reviewed and Mable was able to demonstrate them prior to the end of the session.  Mable demonstrated good  understanding of the education provided. See EMR under Patient Instructions for exercises provided during therapy sessions      ASSESSMENT     Patient tolerated all treatment and progressions well and had no complaints during treatment session today. Cues throughout treatment session for technique and encouraged home exercise program.    Mable Is progressing well towards her goals.   Pt prognosis is Good.     Pt will continue to benefit from skilled outpatient physical therapy to address the deficits listed in the problem list box on initial evaluation, provide pt/family education and to maximize  pt's level of independence in the home and community environment.     Pt's spiritual, cultural and educational needs considered and pt agreeable to plan of care and goals.     Anticipated barriers to physical therapy:  co-morbidities, chronicity of condition, and occupation     Goals:   Reviewed: 7/5/2024       Short Term Goals:  6 weeks Status  Date Met   PAIN: Patient will report worst pain of 5/10 in order to progress toward max functional ability and improve quality of life. [] Progressing  [x] Met  [] Not Met 06/21/2024    FUNCTION: Patient will demonstrate improved function as indicated by a functional intake score of more than or equal to 54 out of 100 on FOTO. [x] Progressing  [] Met  [] Not Met     MOBILITY: Patient will improve Range of Motion to 50% of stated goals, listed in objective measures above, in order to progress towards independence with functional activities.  [x] Progressing  [] Met  [] Not Met     STRENGTH: Patient will improve strength to 50% of stated goals, listed in objective measures above, in order to progress towards independence with functional activities.  [] Progressing  [x] Met  [] Not Met 06/21/2024       Long Term Goals:  12 weeks Status Date Met   PAIN: Patient will report worst pain of 3/10 in order to progress toward max functional ability and improve quality of life [x] Progressing  [] Met  [] Not Met     FUNCTION: Patient will demonstrate improved function as indicated by a functional intake score of more than or equal to 56 out of 100 on FOTO. [x] Progressing  [] Met  [] Not Met     MOBILITY: Patient will improve Range of Motion to stated goals, listed in objective measures above, in order to return to maximal functional potential and improve quality of life. [x] Progressing  [] Met  [] Not Met     STRENGTH: Patient will improve strength to stated goals, listed in objective measures above, in order to improve functional independence and quality of life. [x] Progressing  []  Met  [] Not Met     Patient will return to normal ADL's, IADL's, community involvement, recreational activities, and work-related activities with less than or equal to 3/10 pain and maximal function.  [x] Progressing  [] Met  [] Not Met          PLAN     Monitor response to today's treatment session and progress with Physical Therapy plan of care as indicated.    Plan of Care Certification: 5/17/2024 to 08/09/2024.     Outpatient Physical Therapy 2 times weekly for 12 weeks to include any combination of the following interventions: virtual visits, dry needling, modalities, electrical stimulation (IFC, Pre-Mod, Attended with Functional Dry Needling), Cervical/Lumbar Traction, Gait Training, Manual Therapy, Moist Heat/ Ice, Neuromuscular Re-ed, Patient Education, Self Care, Therapeutic Exercise, and Therapeutic Activites     Katiuska Quijano, PT

## 2024-07-08 DIAGNOSIS — E03.9 HYPOTHYROIDISM, UNSPECIFIED TYPE: ICD-10-CM

## 2024-07-08 DIAGNOSIS — N95.1 HOT FLASHES DUE TO MENOPAUSE: ICD-10-CM

## 2024-07-08 DIAGNOSIS — R63.2 BINGE EATING: Primary | ICD-10-CM

## 2024-07-08 DIAGNOSIS — F45.8 BRUXISM: ICD-10-CM

## 2024-07-08 DIAGNOSIS — R00.2 PALPITATION: ICD-10-CM

## 2024-07-08 DIAGNOSIS — E66.2 CLASS 1 OBESITY WITH ALVEOLAR HYPOVENTILATION, SERIOUS COMORBIDITY, AND BODY MASS INDEX (BMI) OF 33.0 TO 33.9 IN ADULT: ICD-10-CM

## 2024-07-08 DIAGNOSIS — R93.1 HIGH CORONARY ARTERY CALCIUM SCORE: ICD-10-CM

## 2024-07-08 DIAGNOSIS — G47.9 DISTURBANCE OF SLEEP: ICD-10-CM

## 2024-07-08 DIAGNOSIS — L73.9 FOLLICULITIS: ICD-10-CM

## 2024-07-08 DIAGNOSIS — E88.810 METABOLIC SYNDROME: ICD-10-CM

## 2024-07-08 DIAGNOSIS — E88.819 INSULIN RESISTANCE: ICD-10-CM

## 2024-07-08 RX ORDER — ROSUVASTATIN CALCIUM 40 MG/1
40 TABLET, COATED ORAL NIGHTLY
Qty: 90 TABLET | Refills: 3 | Status: SHIPPED | OUTPATIENT
Start: 2024-07-08

## 2024-07-08 RX ORDER — LOSARTAN POTASSIUM 100 MG/1
100 TABLET ORAL NIGHTLY
Qty: 90 TABLET | Refills: 3 | Status: SHIPPED | OUTPATIENT
Start: 2024-07-08

## 2024-07-08 RX ORDER — LISDEXAMFETAMINE DIMESYLATE 30 MG/1
30 CAPSULE ORAL EVERY MORNING
Qty: 30 CAPSULE | Refills: 0 | Status: SHIPPED | OUTPATIENT
Start: 2024-09-06 | End: 2024-10-06

## 2024-07-08 RX ORDER — LEVOTHYROXINE SODIUM 100 UG/1
100 TABLET ORAL
Qty: 90 TABLET | Refills: 3 | Status: SHIPPED | OUTPATIENT
Start: 2024-07-08

## 2024-07-08 RX ORDER — CLONAZEPAM 1 MG/1
1 TABLET ORAL NIGHTLY
Qty: 90 TABLET | Refills: 1 | Status: SHIPPED | OUTPATIENT
Start: 2024-07-08

## 2024-07-08 RX ORDER — FINASTERIDE 5 MG/1
5 TABLET, FILM COATED ORAL
Qty: 90 TABLET | Refills: 3 | Status: SHIPPED | OUTPATIENT
Start: 2024-07-08

## 2024-07-08 RX ORDER — LISDEXAMFETAMINE DIMESYLATE 30 MG/1
30 CAPSULE ORAL EVERY MORNING
Qty: 30 CAPSULE | Refills: 0 | Status: SHIPPED | OUTPATIENT
Start: 2024-08-07 | End: 2024-09-06

## 2024-07-08 RX ORDER — EZETIMIBE 10 MG/1
10 TABLET ORAL DAILY
Qty: 90 TABLET | Refills: 3 | Status: SHIPPED | OUTPATIENT
Start: 2024-07-08 | End: 2025-07-08

## 2024-07-08 RX ORDER — LISDEXAMFETAMINE DIMESYLATE 30 MG/1
30 CAPSULE ORAL EVERY MORNING
Qty: 30 CAPSULE | Refills: 0 | Status: SHIPPED | OUTPATIENT
Start: 2024-07-08 | End: 2024-08-07

## 2024-07-08 RX ORDER — DOXYCYCLINE 40 MG/1
40 CAPSULE ORAL DAILY
Qty: 90 CAPSULE | Refills: 3 | Status: SHIPPED | OUTPATIENT
Start: 2024-07-08

## 2024-07-08 RX ORDER — LIOTHYRONINE SODIUM 5 UG/1
10 TABLET ORAL DAILY
Qty: 180 TABLET | Refills: 3 | Status: SHIPPED | OUTPATIENT
Start: 2024-07-08

## 2024-07-08 RX ORDER — METOPROLOL SUCCINATE 25 MG/1
25 TABLET, EXTENDED RELEASE ORAL DAILY
Qty: 90 TABLET | Refills: 3 | Status: SHIPPED | OUTPATIENT
Start: 2024-07-08

## 2024-07-08 RX ORDER — VENLAFAXINE HYDROCHLORIDE 150 MG/1
150 CAPSULE, EXTENDED RELEASE ORAL DAILY
Qty: 90 CAPSULE | Refills: 3 | Status: SHIPPED | OUTPATIENT
Start: 2024-07-08 | End: 2025-07-08

## 2024-07-08 RX ORDER — PANTOPRAZOLE SODIUM 40 MG/1
40 TABLET, DELAYED RELEASE ORAL NIGHTLY
Qty: 90 TABLET | Refills: 3 | Status: SHIPPED | OUTPATIENT
Start: 2024-07-08

## 2024-08-23 ENCOUNTER — OFFICE VISIT (OUTPATIENT)
Dept: DERMATOLOGY | Facility: CLINIC | Age: 54
End: 2024-08-23
Payer: COMMERCIAL

## 2024-08-23 VITALS — HEIGHT: 66 IN | BODY MASS INDEX: 34.15 KG/M2 | WEIGHT: 212.5 LBS

## 2024-08-23 DIAGNOSIS — L82.1 SEBORRHEIC KERATOSES: ICD-10-CM

## 2024-08-23 DIAGNOSIS — Z12.83 SCREENING, MALIGNANT NEOPLASM, SKIN: ICD-10-CM

## 2024-08-23 DIAGNOSIS — L65.9 HAIR LOSS: ICD-10-CM

## 2024-08-23 DIAGNOSIS — B07.8 OTHER VIRAL WARTS: Primary | ICD-10-CM

## 2024-08-23 DIAGNOSIS — L57.0 ACTINIC KERATOSIS: ICD-10-CM

## 2024-08-23 PROCEDURE — 99999 PR PBB SHADOW E&M-EST. PATIENT-LVL III: CPT | Mod: PBBFAC,,, | Performed by: STUDENT IN AN ORGANIZED HEALTH CARE EDUCATION/TRAINING PROGRAM

## 2024-08-23 NOTE — PROGRESS NOTES
Subjective:       Patient ID:  Mable Redding is a 53 y.o. female who presents for   Chief Complaint   Patient presents with    Skin Check     History of Present Illness: The patient presents with chief complaint of a skin examination and evaluation of a skin lesion.  Location: right chest, right thigh  Duration: several months  Signs/Symptoms: growing, reddish, crusted growth. Denies any other rapidly growing, bleeding or non healing skin lesions.   Prior treatments: none          Review of Systems   Constitutional:  Negative for fever and chills.   Skin:  Positive for rash (Patient with long standing scalp irritation and scarring from scratchin on the scalp, currently in flare. Previous ILK injections were helpful).        Objective:    Physical Exam   Constitutional: She appears well-developed and well-nourished. No distress.   Neurological: She is alert and oriented to person, place, and time. She is not disoriented.   Psychiatric: She has a normal mood and affect.   Skin:   Areas Examined (abnormalities noted in diagram):   Head / Face Inspection Performed  Neck Inspection Performed  Chest / Axilla Inspection Performed  Abdomen Inspection Performed  Back Inspection Performed  RUE Inspected  LUE Inspection Performed  RLE Inspected  LLE Inspection Performed                   Diagram Legend     Erythematous scaling macule/papule c/w actinic keratosis       Vascular papule c/w angioma      Pigmented verrucoid papule/plaque c/w seborrheic keratosis      Yellow umbilicated papule c/w sebaceous hyperplasia      Irregularly shaped tan macule c/w lentigo     1-2 mm smooth white papules consistent with Milia      Movable subcutaneous cyst with punctum c/w epidermal inclusion cyst      Subcutaneous movable cyst c/w pilar cyst      Firm pink to brown papule c/w dermatofibroma      Pedunculated fleshy papule(s) c/w skin tag(s)      Evenly pigmented macule c/w junctional nevus     Mildly variegated pigmented,  slightly irregular-bordered macule c/w mildly atypical nevus      Flesh colored to evenly pigmented papule c/w intradermal nevus       Pink pearly papule/plaque c/w basal cell carcinoma      Erythematous hyperkeratotic cursted plaque c/w SCC      Surgical scar with no sign of skin cancer recurrence      Open and closed comedones      Inflammatory papules and pustules      Verrucoid papule consistent consistent with wart     Erythematous eczematous patches and plaques     Dystrophic onycholytic nail with subungual debris c/w onychomycosis     Umbilicated papule    Erythematous-base heme-crusted tan verrucoid plaque consistent with inflamed seborrheic keratosis     Erythematous Silvery Scaling Plaque c/w Psoriasis     See annotation      Assessment / Plan:        Other viral warts  Cryosurgery procedure note:    Verbal consent from the patient is obtained including, but not limited to, risk of hypopigmentation/hyperpigmentation, scar, recurrence of lesion. Liquid nitrogen cryosurgery is applied to 1 verruca with prior paring, as detailed in the physical exam, to produce a freeze injury. 1 consecutive freeze thaw cycles are applied to each verruca. The patient is aware that blisters (possibly blood blisters) may form.    Seborrheic keratoses  These are benign inherited growths without a malignant potential. Reassurance given to patient. No treatment is necessary.     Actinic keratosis  Cryosurgery Procedure Note    Verbal consent from the patient is obtained including, but not limited to, risk of hypopigmentation/hyperpigmentation, scar, recurrence of lesion. The patient is aware of the precancerous quality and need for treatment of these lesions. Liquid nitrogen cryosurgery is applied to the 1 actinic keratoses, as detailed in the physical exam, to produce a freeze injury. The patient is aware that blisters may form and is instructed on wound care with gentle cleansing and use of vaseline ointment to keep moist until  healed. The patient is supplied a handout on cryosurgery and is instructed to call if lesions do not completely resolve.    Screening, malignant neoplasm, skin  Upper and lower body skin examination performed today including at least 10 points as noted in physical examination. No lesions suspicious for malignancy noted.  Reassurance provided.    Instructed patient to observe lesion(s) for changes and follow up in clinic if changes are noted. Patient to monitor skin at home for new or changing lesions and follow up in clinic if noted.    Discussed ABCDE's of moles and brochure provided.    Hair loss  -     triamcinolone acetonide injection 10 mg    Intralesional Kenalog 5mg/cc (4 cc total) injected into <7 lesions on the scalp today after obtaining verbal consent including risk of surrounding hypopigmentation. Patient tolerated procedure well.    NDC for Kenalog 10mg/cc:  7057-4335-62           Follow up in about 8 weeks (around 10/18/2024).

## 2024-09-16 DIAGNOSIS — R00.2 PALPITATION: ICD-10-CM

## 2024-09-16 DIAGNOSIS — E03.9 HYPOTHYROIDISM, UNSPECIFIED TYPE: ICD-10-CM

## 2024-09-16 DIAGNOSIS — R93.1 HIGH CORONARY ARTERY CALCIUM SCORE: ICD-10-CM

## 2024-09-16 DIAGNOSIS — F45.8 BRUXISM: ICD-10-CM

## 2024-09-16 DIAGNOSIS — G47.9 DISTURBANCE OF SLEEP: ICD-10-CM

## 2024-09-16 DIAGNOSIS — N95.1 HOT FLASHES DUE TO MENOPAUSE: ICD-10-CM

## 2024-09-16 RX ORDER — DICLOFENAC SODIUM 75 MG/1
75 TABLET, DELAYED RELEASE ORAL 2 TIMES DAILY PRN
Qty: 60 TABLET | Refills: 2 | Status: SHIPPED | OUTPATIENT
Start: 2024-09-16

## 2024-09-16 RX ORDER — LEVOTHYROXINE SODIUM 100 UG/1
100 TABLET ORAL
Qty: 90 TABLET | Refills: 3 | Status: SHIPPED | OUTPATIENT
Start: 2024-09-16

## 2024-09-16 RX ORDER — PANTOPRAZOLE SODIUM 40 MG/1
40 TABLET, DELAYED RELEASE ORAL NIGHTLY
Qty: 90 TABLET | Refills: 3 | Status: SHIPPED | OUTPATIENT
Start: 2024-09-16

## 2024-09-16 RX ORDER — LISDEXAMFETAMINE DIMESYLATE 40 MG/1
40 CAPSULE ORAL EVERY MORNING
Qty: 30 CAPSULE | Refills: 0 | Status: SHIPPED | OUTPATIENT
Start: 2024-10-16 | End: 2024-11-15

## 2024-09-16 RX ORDER — LIOTHYRONINE SODIUM 5 UG/1
10 TABLET ORAL DAILY
Qty: 180 TABLET | Refills: 3 | Status: SHIPPED | OUTPATIENT
Start: 2024-09-16

## 2024-09-16 RX ORDER — METOPROLOL SUCCINATE 25 MG/1
25 TABLET, EXTENDED RELEASE ORAL DAILY
Qty: 90 TABLET | Refills: 3 | Status: SHIPPED | OUTPATIENT
Start: 2024-09-16

## 2024-09-16 RX ORDER — FINASTERIDE 5 MG/1
5 TABLET, FILM COATED ORAL
Qty: 90 TABLET | Refills: 3 | Status: SHIPPED | OUTPATIENT
Start: 2024-09-16

## 2024-09-16 RX ORDER — LISDEXAMFETAMINE DIMESYLATE 40 MG/1
40 CAPSULE ORAL EVERY MORNING
Qty: 30 CAPSULE | Refills: 0 | Status: SHIPPED | OUTPATIENT
Start: 2024-09-16 | End: 2024-10-16

## 2024-09-16 RX ORDER — CLONAZEPAM 1 MG/1
1 TABLET ORAL NIGHTLY
Qty: 90 TABLET | Refills: 1 | Status: SHIPPED | OUTPATIENT
Start: 2024-09-16

## 2024-09-16 RX ORDER — LOSARTAN POTASSIUM 100 MG/1
100 TABLET ORAL NIGHTLY
Qty: 90 TABLET | Refills: 3 | Status: SHIPPED | OUTPATIENT
Start: 2024-09-16

## 2024-09-16 RX ORDER — ROSUVASTATIN CALCIUM 40 MG/1
40 TABLET, COATED ORAL NIGHTLY
Qty: 90 TABLET | Refills: 3 | Status: SHIPPED | OUTPATIENT
Start: 2024-09-16

## 2024-09-16 RX ORDER — LISDEXAMFETAMINE DIMESYLATE 40 MG/1
40 CAPSULE ORAL EVERY MORNING
Qty: 30 CAPSULE | Refills: 0 | Status: SHIPPED | OUTPATIENT
Start: 2024-11-15 | End: 2024-12-15

## 2024-09-16 RX ORDER — EZETIMIBE 10 MG/1
10 TABLET ORAL DAILY
Qty: 90 TABLET | Refills: 3 | Status: SHIPPED | OUTPATIENT
Start: 2024-09-16 | End: 2025-09-16

## 2024-09-16 RX ORDER — VENLAFAXINE HYDROCHLORIDE 150 MG/1
150 CAPSULE, EXTENDED RELEASE ORAL DAILY
Qty: 90 CAPSULE | Refills: 3 | Status: SHIPPED | OUTPATIENT
Start: 2024-09-16 | End: 2025-09-16

## 2024-09-30 DIAGNOSIS — G47.9 DISTURBANCE OF SLEEP: ICD-10-CM

## 2024-09-30 DIAGNOSIS — N95.1 HOT FLASHES DUE TO MENOPAUSE: ICD-10-CM

## 2024-09-30 RX ORDER — VENLAFAXINE HYDROCHLORIDE 75 MG/1
75 CAPSULE, EXTENDED RELEASE ORAL DAILY
Qty: 90 CAPSULE | Refills: 3 | Status: SHIPPED | OUTPATIENT
Start: 2024-09-30 | End: 2025-09-30

## 2024-10-01 DIAGNOSIS — E61.1 IRON DEFICIENCY: Primary | ICD-10-CM

## 2024-10-02 ENCOUNTER — LAB VISIT (OUTPATIENT)
Dept: LAB | Facility: HOSPITAL | Age: 54
End: 2024-10-02
Attending: NURSE PRACTITIONER
Payer: COMMERCIAL

## 2024-10-02 DIAGNOSIS — E61.1 IRON DEFICIENCY: ICD-10-CM

## 2024-10-02 DIAGNOSIS — F45.8 BRUXISM: ICD-10-CM

## 2024-10-02 LAB
ALBUMIN SERPL BCP-MCNC: 3.9 G/DL (ref 3.5–5.2)
ALP SERPL-CCNC: 79 U/L (ref 55–135)
ALT SERPL W/O P-5'-P-CCNC: 31 U/L (ref 10–44)
ANION GAP SERPL CALC-SCNC: 7 MMOL/L (ref 8–16)
AST SERPL-CCNC: 25 U/L (ref 10–40)
BASOPHILS # BLD AUTO: 0.04 K/UL (ref 0–0.2)
BASOPHILS NFR BLD: 0.7 % (ref 0–1.9)
BILIRUB SERPL-MCNC: 0.4 MG/DL (ref 0.1–1)
BUN SERPL-MCNC: 22 MG/DL (ref 6–20)
CALCIUM SERPL-MCNC: 9.6 MG/DL (ref 8.7–10.5)
CHLORIDE SERPL-SCNC: 105 MMOL/L (ref 95–110)
CO2 SERPL-SCNC: 27 MMOL/L (ref 23–29)
CREAT SERPL-MCNC: 1 MG/DL (ref 0.5–1.4)
DIFFERENTIAL METHOD BLD: ABNORMAL
EOSINOPHIL # BLD AUTO: 0.4 K/UL (ref 0–0.5)
EOSINOPHIL NFR BLD: 6.7 % (ref 0–8)
ERYTHROCYTE [DISTWIDTH] IN BLOOD BY AUTOMATED COUNT: 13.1 % (ref 11.5–14.5)
EST. GFR  (NO RACE VARIABLE): >60 ML/MIN/1.73 M^2
FERRITIN SERPL-MCNC: 187 NG/ML (ref 20–300)
GLUCOSE SERPL-MCNC: 86 MG/DL (ref 70–110)
HCT VFR BLD AUTO: 42.6 % (ref 37–48.5)
HGB BLD-MCNC: 13.4 G/DL (ref 12–16)
IMM GRANULOCYTES # BLD AUTO: 0.02 K/UL (ref 0–0.04)
IMM GRANULOCYTES NFR BLD AUTO: 0.3 % (ref 0–0.5)
IRON SERPL-MCNC: 84 UG/DL (ref 30–160)
LYMPHOCYTES # BLD AUTO: 2.1 K/UL (ref 1–4.8)
LYMPHOCYTES NFR BLD: 35 % (ref 18–48)
MCH RBC QN AUTO: 28.1 PG (ref 27–31)
MCHC RBC AUTO-ENTMCNC: 31.5 G/DL (ref 32–36)
MCV RBC AUTO: 89 FL (ref 82–98)
MONOCYTES # BLD AUTO: 0.6 K/UL (ref 0.3–1)
MONOCYTES NFR BLD: 9.7 % (ref 4–15)
NEUTROPHILS # BLD AUTO: 2.8 K/UL (ref 1.8–7.7)
NEUTROPHILS NFR BLD: 47.6 % (ref 38–73)
NRBC BLD-RTO: 0 /100 WBC
PLATELET # BLD AUTO: 240 K/UL (ref 150–450)
PMV BLD AUTO: 9.5 FL (ref 9.2–12.9)
POTASSIUM SERPL-SCNC: 4.2 MMOL/L (ref 3.5–5.1)
PROT SERPL-MCNC: 6.6 G/DL (ref 6–8.4)
RBC # BLD AUTO: 4.77 M/UL (ref 4–5.4)
SATURATED IRON: 23 % (ref 20–50)
SODIUM SERPL-SCNC: 139 MMOL/L (ref 136–145)
TOTAL IRON BINDING CAPACITY: 360 UG/DL (ref 250–450)
TRANSFERRIN SERPL-MCNC: 243 MG/DL (ref 200–375)
WBC # BLD AUTO: 5.97 K/UL (ref 3.9–12.7)

## 2024-10-02 PROCEDURE — 83540 ASSAY OF IRON: CPT | Performed by: NURSE PRACTITIONER

## 2024-10-02 PROCEDURE — 80053 COMPREHEN METABOLIC PANEL: CPT | Performed by: NURSE PRACTITIONER

## 2024-10-02 PROCEDURE — 36415 COLL VENOUS BLD VENIPUNCTURE: CPT | Mod: PO | Performed by: NURSE PRACTITIONER

## 2024-10-02 PROCEDURE — 82728 ASSAY OF FERRITIN: CPT | Performed by: NURSE PRACTITIONER

## 2024-10-02 PROCEDURE — 85025 COMPLETE CBC W/AUTO DIFF WBC: CPT | Performed by: NURSE PRACTITIONER

## 2024-10-03 RX ORDER — CLONAZEPAM 1 MG/1
1 TABLET ORAL NIGHTLY
Qty: 90 TABLET | Refills: 1 | Status: SHIPPED | OUTPATIENT
Start: 2024-10-03

## 2024-10-07 ENCOUNTER — OFFICE VISIT (OUTPATIENT)
Dept: SPORTS MEDICINE | Facility: CLINIC | Age: 54
End: 2024-10-07
Payer: COMMERCIAL

## 2024-10-07 DIAGNOSIS — M25.551 GREATER TROCHANTERIC PAIN SYNDROME OF RIGHT LOWER EXTREMITY: Primary | ICD-10-CM

## 2024-10-07 PROCEDURE — 99213 OFFICE O/P EST LOW 20 MIN: CPT | Mod: S$GLB,,, | Performed by: STUDENT IN AN ORGANIZED HEALTH CARE EDUCATION/TRAINING PROGRAM

## 2024-10-07 PROCEDURE — 99999 PR PBB SHADOW E&M-EST. PATIENT-LVL II: CPT | Mod: PBBFAC,,, | Performed by: STUDENT IN AN ORGANIZED HEALTH CARE EDUCATION/TRAINING PROGRAM

## 2024-10-07 NOTE — PATIENT INSTRUCTIONS
Assessment:  Mable Redding is a 53 y.o. female with a chief complaint of Pain of the Right Hip    Encounter Diagnosis   Name Primary?    Greater trochanteric pain syndrome of right lower extremity Yes      Plan:  Mable has failed to improve with conservative treatment of her right hip including rest, activity modification, NSAIDs, cortisone injection, and physical therapy.  She has elected to proceed with right hip Tenjet procedure    Follow-up: 3-4 weeks for Tenjet or sooner if there are any problems between now and then.    Thank you for choosing [facility] and Dr. Daniel Srinivasan for your orthopedic & sports medicine care. It is our goal to provide you with exceptional care that will help keep you healthy, active, and get you back in the game.    Please do not hesitate to reach out to us via email, phone, or MyChart with any questions, concerns, or feedback.    If you are experiencing pain/discomfort ,or have questions after 5pm and would like to be connected to the [facility] on-call team, please call this number and specify which Sports Medicine provider is treating you: [phone]        Here is some general information on TenJet procedure. You may also watch the video on this link to see an overview of how the procedure goes as well.   TenJet Video                   [email or web address]

## 2024-10-07 NOTE — PROGRESS NOTES
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 female who presents today with Pain of the Right Hip    She was initially evaluated in our office on 2/5/24 for chronic bilateral R>L hip pain for many years.  She was diagnosed with right GTPS with MRI demonstrating a partial thickness gluteal tendon tear and treated with a GT cortisone injection in March 2024 which helped tremendously but only lasted for 6 weeks. At her last visit on 5/6/24 she was referred to PT for hip strengthening.    Today, she reports that she has had recurrence of pain and limited improvement with PT.  She is having a lot of pain while sleeping due to her hips and back.    Right Hip:  She complains of bilateral hip pain R>L that has been present for many years, worsening since summer 2023 without any new injuries.    She has just been dealing with the pain for many years.  A recent MRI demonstrated tendinopathy and partial thickness tearing of the lateral gluteal tendons.  She received a GT cortisone injection in March 2024 which helped tremendously but only lasted for 6 weeks.    Past Medical History:   Past Medical History:   Diagnosis Date    Anxiety     Bursitis     Right hip    COVID-19 11/3/2021    Depression     Digestive disorder     Herpes genitalia     History of blood clots     HPV (human papilloma virus) infection     HTN (hypertension) 5/17/2022    Lupus anticoagulant disorder     Lupus anticoagulant disorder 1/30/2019    Pulmonary embolus     Thyroid disease      Past Surgical History:   Procedure Laterality Date    ADENOIDECTOMY      ARTHROSCOPIC CHONDROPLASTY OF KNEE JOINT Left 5/27/2022    Procedure: ARTHROSCOPY, KNEE, WITH CHONDROPLASTY;  Surgeon: Rudy Dempsey MD;  Location: [facility];  Service: Orthopedics;   Laterality: Left;  Left knee chondroplasty of the left knee medial femoral condyle and medial tibial plateau    ARTHROSCOPY OF KNEE Left 2022    Procedure: ARTHROSCOPY, KNEE;  Surgeon: Rudy Dempsey MD;  Location: [facility];  Service: Orthopedics;  Laterality: Left;     SECTION      DILATION AND CURETTAGE OF UTERUS      HYSTERECTOMY  2017    HYSTEROSCOPY      KNEE ARTHROSCOPY W/ MENISCECTOMY Left 2022    Procedure: ARTHROSCOPY, KNEE, WITH MENISCECTOMY;  Surgeon: Rudy Dempsey MD;  Location: [facility] OR;  Service: Orthopedics;  Laterality: Left;  partial medial meniscectomy (approximately 60-70% left of the posterior horn) and lateral meniscectomy (small flap on anterior horn    LIPOMA RESECTION      OTHER SURGICAL HISTORY      Patient states she feel SOB in recovery and BP drops. Requests to be placed on side or sitting up.    ROBOTIC HYSTERECTOMY, WITH SALPINGECTOMY Bilateral     TONSILLECTOMY       Family History   Problem Relation Name Age of Onset    Hyperlipidemia Mother      Coronary artery disease Father      Hyperlipidemia Father      Hypertension Father      No Known Problems Sister      No Known Problems Maternal Aunt      No Known Problems Maternal Uncle      No Known Problems Paternal Aunt      No Known Problems Paternal Uncle      No Known Problems Maternal Grandmother      Diabetes type II Maternal Grandfather      Hypertension Paternal Grandmother      Hyperlipidemia Paternal Grandmother      Heart disease Paternal Grandmother      Lung cancer Paternal Grandmother      Lung cancer Paternal Grandfather      Amblyopia Neg Hx      Blindness Neg Hx      Cancer Neg Hx      Cataracts Neg Hx      Diabetes Neg Hx      Glaucoma Neg Hx      Macular degeneration Neg Hx      Retinal detachment Neg Hx      Strabismus Neg Hx      Stroke Neg Hx      Thyroid disease Neg Hx       Social History     Socioeconomic History    Marital status:    Tobacco Use    Smoking status: Former      Current packs/day: 0.00     Average packs/day: 0.3 packs/day for 10.0 years (2.5 ttl pk-yrs)     Types: Cigarettes     Start date:      Quit date:      Years since quittin.7     Passive exposure: Never    Smokeless tobacco: Never   Substance and Sexual Activity    Alcohol use: Yes     Comment: Socially    Drug use: No    Sexual activity: Yes     Partners: Male     Birth control/protection: See Surgical Hx     Medication List with Changes/Refills   Current Medications    CLONAZEPAM (KLONOPIN) 1 MG TABLET    Take 1 tablet (1 mg total) by mouth every evening.    DICLOFENAC (VOLTAREN) 75 MG EC TABLET    Take 1 tablet (75 mg total) by mouth 2 (two) times daily as needed (joint pain).    DOXYCYCLINE (ORACEA) 40 MG CAPSULE    Take 1 capsule (40 mg total) by mouth once daily.    EZETIMIBE (ZETIA) 10 MG TABLET    Take 1 tablet (10 mg total) by mouth once daily.    FINASTERIDE (PROSCAR) 5 MG TABLET    Take 1 tablet by mouth daily    FLUCONAZOLE (DIFLUCAN) 150 MG TAB    Take 1 tablet (150 mg total) by mouth once daily.    IVERMECTIN (SOOLANTRA) 1 % CREA        LEVOTHYROXINE (SYNTHROID) 100 MCG TABLET    Take 1 tablet (100 mcg total) by mouth before breakfast.    LIOTHYRONINE (CYTOMEL) 5 MCG TAB    Take 2 tablets (10 mcg total) by mouth once daily.    LISDEXAMFETAMINE (VYVANSE) 40 MG CAP    Take 1 capsule (40 mg total) by mouth every morning.    LISDEXAMFETAMINE (VYVANSE) 40 MG CAP    Take 1 capsule (40 mg total) by mouth every morning.    LISDEXAMFETAMINE (VYVANSE) 40 MG CAP    Take 1 capsule (40 mg total) by mouth every morning.    LORATADINE (CLARITIN) 10 MG TABLET    TAKE 1 TABLET BY MOUTH EVERY DAY AS NEEDED FOR ALLERGY    LOSARTAN (COZAAR) 100 MG TABLET    Take 1 tablet (100 mg total) by mouth every evening.    METOPROLOL SUCCINATE (TOPROL-XL) 25 MG 24 HR TABLET    Take 1 tablet (25 mg total) by mouth once daily.    MINOXIDIL (LONITEN) 2.5 MG TABLET    Take 0.5 tablets (1.25 mg total) by mouth once daily.     MINOXIDIL (LONITEN) 2.5 MG TABLET    Take 0.5/half tablets by mouth daily    MUPIROCIN (BACTROBAN) 2 % OINTMENT    Apply to nostrils 2 (two) times daily.    PANTOPRAZOLE (PROTONIX) 40 MG TABLET    Take 1 tablet (40 mg total) by mouth every evening.    ROSUVASTATIN (CRESTOR) 40 MG TAB    Take 1 tablet (40 mg total) by mouth every evening.    VENLAFAXINE (EFFEXOR-XR) 75 MG 24 HR CAPSULE    Take 1 capsule (75 mg total) by mouth once daily.     Review of patient's allergies indicates:   Allergen Reactions    Aldactone [spironolactone] Swelling    Remdesivir Hives    Sulfa (sulfonamide antibiotics) Edema    Trimethoprim      Other Reaction(s): SWELLING OF MUCUS MEMBRANES       Physical Exam:   There is no height or weight on file to calculate BMI.    Detailed MSK exam:     Right Hip:  Inspection: No swelling, erythema or ecchymosis.   Palpation tenderness: Greater trochanter, lateral gluteal tendon  Range of motion: 120 deg Flexion       40 deg IR       65 deg ER  Strength:  5/5 Flexion    5-/5 Abduction pain limited    5/5 Adduction  Impingement Tests: Negative LENA     Negative FADIR  Other Tests:  Negative Log Roll  Negative Circumduction  Negative Piriformis     Negative Marek's     Negative SLR   N/V Exam:  Tibial:    Normal sensory (plantar foot)  Normal motor (FHL)    Sup Peroneal:  Normal sensory (dorsal foot)  Normal motor (Peroneals)            Deep Peroneal:  Normal sensory (1st web space) Normal motor (EHL)    Sural:   Normal sensory (lateral foot)   Saphenous:   Normal sensory (medial lower leg)   Normal pedal pulses, warm and well perfused with capillary refill < 2 sec     Imaging:     XR Results:  Results for orders placed during the hospital encounter of 02/05/24    X-Ray Hip 4 or more views Right (with Pelvis when performed)    Narrative  EXAMINATION:  XR HIP WITH PELVIS WHEN PERFORMED, 4 OR MORE VIEWS RIGHT    CLINICAL HISTORY:  Pain in right hip    COMPARISON:  None    FINDINGS:  There is no fracture.  There is no dislocation.  The joint space of the right hip is normal in appearance.    Impression  Normal study.      Electronically signed by: Jimmy Cosby MD  Date:    02/05/2024  Time:    09:42    This was discussed with the patient and / or family today.       Patient Instructions   Assessment:  Mable Redding is a 53 y.o. female with a chief complaint of Pain of the Right Hip    Encounter Diagnosis   Name Primary?    Greater trochanteric pain syndrome of right lower extremity Yes      Plan:  Mable has failed to improve with conservative treatment of her right hip including rest, activity modification, NSAIDs, cortisone injection, and physical therapy.  She has elected to proceed with right hip Tenjet procedure    Follow-up: 3-4 weeks for Tenjet or sooner if there are any problems between now and then.    Thank you for choosing [facility] and Dr. Daniel Srinivasan for your orthopedic & sports medicine care. It is our goal to provide you with exceptional care that will help keep you healthy, active, and get you back in the game.    Please do not hesitate to reach out to us via email, phone, or MyChart with any questions, concerns, or feedback.    If you are experiencing pain/discomfort ,or have questions after 5pm and would like to be connected to the [facility] on-call team, please call this number and specify which Sports Medicine provider is treating you: [phone]      A copy of today's visit note has been sent to the referring provider.           Daniel Srinivasan MD  Primary Care Sports Medicine    Disclaimer: This note was prepared using a voice recognition system and is likely to have sound alike errors within the text.

## 2024-11-08 ENCOUNTER — OFFICE VISIT (OUTPATIENT)
Dept: DERMATOLOGY | Facility: CLINIC | Age: 54
End: 2024-11-08
Payer: COMMERCIAL

## 2024-11-08 DIAGNOSIS — L65.9 HAIR LOSS: Primary | ICD-10-CM

## 2024-11-08 PROCEDURE — 99999 PR PBB SHADOW E&M-EST. PATIENT-LVL IV: CPT | Mod: PBBFAC,,, | Performed by: STUDENT IN AN ORGANIZED HEALTH CARE EDUCATION/TRAINING PROGRAM

## 2024-11-08 RX ORDER — CLOBETASOL PROPIONATE 0.5 MG/ML
SOLUTION TOPICAL DAILY
Qty: 50 ML | Refills: 3 | Status: SHIPPED | OUTPATIENT
Start: 2024-11-08

## 2024-11-08 RX ORDER — MINOXIDIL 2.5 MG/1
TABLET ORAL
Qty: 90 TABLET | Refills: 1 | Status: SHIPPED | OUTPATIENT
Start: 2024-11-08

## 2024-11-08 NOTE — PROGRESS NOTES
Subjective:       Patient ID:  Mable Redding is a 54 y.o. female who presents for   Chief Complaint   Patient presents with    Hair/Scalp Problem     Scalp injections     History of Present Illness: The patient presents for follow up of hair loss/irritation, last seen on 8/23/24 where she had the scalp injected with ILK. Reports that the injections were helpful initially, but symptoms are starting to return. Would like repeat injections. Patient also on minoxidil 1.25mg daily for hair loss; needs refills.           Review of Systems   Constitutional:  Negative for fever and chills.   Skin:  Negative for itching, rash and dry skin.        Objective:    Physical Exam   Constitutional: She appears well-developed and well-nourished. No distress.   Neurological: She is alert and oriented to person, place, and time. She is not disoriented.   Psychiatric: She has a normal mood and affect.   Skin:   Areas Examined (abnormalities noted in diagram):   Head / Face Inspection Performed  Neck Inspection Performed  Chest / Axilla Inspection Performed  Abdomen Inspection Performed  Back Inspection Performed  RUE Inspected  LUE Inspection Performed  RLE Inspected  LLE Inspection Performed              Diagram Legend     Erythematous scaling macule/papule c/w actinic keratosis       Vascular papule c/w angioma      Pigmented verrucoid papule/plaque c/w seborrheic keratosis      Yellow umbilicated papule c/w sebaceous hyperplasia      Irregularly shaped tan macule c/w lentigo     1-2 mm smooth white papules consistent with Milia      Movable subcutaneous cyst with punctum c/w epidermal inclusion cyst      Subcutaneous movable cyst c/w pilar cyst      Firm pink to brown papule c/w dermatofibroma      Pedunculated fleshy papule(s) c/w skin tag(s)      Evenly pigmented macule c/w junctional nevus     Mildly variegated pigmented, slightly irregular-bordered macule c/w mildly atypical nevus      Flesh colored to evenly pigmented  papule c/w intradermal nevus       Pink pearly papule/plaque c/w basal cell carcinoma      Erythematous hyperkeratotic cursted plaque c/w SCC      Surgical scar with no sign of skin cancer recurrence      Open and closed comedones      Inflammatory papules and pustules      Verrucoid papule consistent consistent with wart     Erythematous eczematous patches and plaques     Dystrophic onycholytic nail with subungual debris c/w onychomycosis     Umbilicated papule    Erythematous-base heme-crusted tan verrucoid plaque consistent with inflamed seborrheic keratosis     Erythematous Silvery Scaling Plaque c/w Psoriasis     See annotation      Assessment / Plan:        Hair loss - symptoms stable. Injection #2 today.   -     triamcinolone acetonide injection 10 mg  -     clobetasoL (TEMOVATE) 0.05 % external solution; Apply topically once daily.  Dispense: 50 mL; Refill: 3    Intralesional Kenalog 5mg/cc (4 cc total) injected into <7 lesions on the today after obtaining verbal consent including risk of surrounding hypopigmentation. Patient tolerated procedure well.    NDC for Kenalog 10mg/cc:  4947-4028-32           No follow-ups on file.

## 2024-11-21 ENCOUNTER — PATIENT MESSAGE (OUTPATIENT)
Dept: SPORTS MEDICINE | Facility: CLINIC | Age: 54
End: 2024-11-21
Payer: COMMERCIAL

## 2024-11-25 DIAGNOSIS — G47.9 DISTURBANCE OF SLEEP: ICD-10-CM

## 2024-11-25 DIAGNOSIS — R63.2 BINGE EATING: Primary | ICD-10-CM

## 2024-11-25 DIAGNOSIS — N95.1 HOT FLASHES DUE TO MENOPAUSE: ICD-10-CM

## 2024-11-25 DIAGNOSIS — E03.9 HYPOTHYROIDISM, UNSPECIFIED TYPE: ICD-10-CM

## 2024-11-25 RX ORDER — LISDEXAMFETAMINE DIMESYLATE 40 MG/1
40 CAPSULE ORAL EVERY MORNING
Qty: 30 CAPSULE | Refills: 0 | Status: SHIPPED | OUTPATIENT
Start: 2024-11-25 | End: 2024-12-25

## 2024-11-25 RX ORDER — LEVOTHYROXINE SODIUM 100 UG/1
100 TABLET ORAL
Qty: 90 TABLET | Refills: 3 | Status: SHIPPED | OUTPATIENT
Start: 2024-11-25

## 2024-12-16 ENCOUNTER — OFFICE VISIT (OUTPATIENT)
Dept: PRIMARY CARE CLINIC | Facility: CLINIC | Age: 54
End: 2024-12-16
Payer: COMMERCIAL

## 2024-12-16 DIAGNOSIS — I10 PRIMARY HYPERTENSION: ICD-10-CM

## 2024-12-16 DIAGNOSIS — L65.9 HAIR LOSS: ICD-10-CM

## 2024-12-16 DIAGNOSIS — G47.9 DISTURBANCE OF SLEEP: ICD-10-CM

## 2024-12-16 DIAGNOSIS — R63.2 BINGE EATING: ICD-10-CM

## 2024-12-16 DIAGNOSIS — F45.8 BRUXISM: ICD-10-CM

## 2024-12-16 DIAGNOSIS — K21.00 GASTROESOPHAGEAL REFLUX DISEASE WITH ESOPHAGITIS WITHOUT HEMORRHAGE: ICD-10-CM

## 2024-12-16 DIAGNOSIS — Z12.11 COLON CANCER SCREENING: ICD-10-CM

## 2024-12-16 DIAGNOSIS — Z11.4 ENCOUNTER FOR SCREENING FOR HIV: ICD-10-CM

## 2024-12-16 DIAGNOSIS — Z11.59 ENCOUNTER FOR HCV SCREENING TEST FOR LOW RISK PATIENT: ICD-10-CM

## 2024-12-16 DIAGNOSIS — R00.2 PALPITATION: ICD-10-CM

## 2024-12-16 DIAGNOSIS — E03.9 HYPOTHYROIDISM, UNSPECIFIED TYPE: ICD-10-CM

## 2024-12-16 DIAGNOSIS — Z13.1 ENCOUNTER FOR SCREENING FOR DIABETES MELLITUS: ICD-10-CM

## 2024-12-16 DIAGNOSIS — N95.1 HOT FLASHES DUE TO MENOPAUSE: ICD-10-CM

## 2024-12-16 DIAGNOSIS — Z00.00 ANNUAL PHYSICAL EXAM: Primary | ICD-10-CM

## 2024-12-16 DIAGNOSIS — Z76.89 ENCOUNTER TO ESTABLISH CARE: ICD-10-CM

## 2024-12-16 DIAGNOSIS — R93.1 HIGH CORONARY ARTERY CALCIUM SCORE: ICD-10-CM

## 2024-12-16 PROCEDURE — 99396 PREV VISIT EST AGE 40-64: CPT | Mod: 95,,, | Performed by: FAMILY MEDICINE

## 2024-12-16 RX ORDER — EZETIMIBE 10 MG/1
10 TABLET ORAL DAILY
Qty: 90 TABLET | Refills: 3 | Status: SHIPPED | OUTPATIENT
Start: 2024-12-16 | End: 2025-12-16

## 2024-12-16 RX ORDER — LISDEXAMFETAMINE DIMESYLATE 40 MG/1
40 CAPSULE ORAL DAILY
Qty: 30 CAPSULE | Refills: 0 | Status: SHIPPED | OUTPATIENT
Start: 2025-01-16 | End: 2025-02-15

## 2024-12-16 RX ORDER — ROSUVASTATIN CALCIUM 40 MG/1
40 TABLET, COATED ORAL NIGHTLY
Qty: 90 TABLET | Refills: 3 | Status: SHIPPED | OUTPATIENT
Start: 2024-12-16

## 2024-12-16 RX ORDER — LIOTHYRONINE SODIUM 5 UG/1
10 TABLET ORAL DAILY
Qty: 180 TABLET | Refills: 3 | Status: SHIPPED | OUTPATIENT
Start: 2024-12-16

## 2024-12-16 RX ORDER — LISDEXAMFETAMINE DIMESYLATE 40 MG/1
40 CAPSULE ORAL DAILY
Qty: 30 CAPSULE | Refills: 0 | Status: SHIPPED | OUTPATIENT
Start: 2025-02-16 | End: 2025-03-18

## 2024-12-16 RX ORDER — FINASTERIDE 5 MG/1
5 TABLET, FILM COATED ORAL
Qty: 90 TABLET | Refills: 3 | Status: SHIPPED | OUTPATIENT
Start: 2024-12-16

## 2024-12-16 RX ORDER — PANTOPRAZOLE SODIUM 40 MG/1
40 TABLET, DELAYED RELEASE ORAL NIGHTLY
Qty: 90 TABLET | Refills: 3 | Status: SHIPPED | OUTPATIENT
Start: 2024-12-16

## 2024-12-16 RX ORDER — LOSARTAN POTASSIUM 100 MG/1
100 TABLET ORAL NIGHTLY
Qty: 90 TABLET | Refills: 3 | Status: SHIPPED | OUTPATIENT
Start: 2024-12-16

## 2024-12-16 RX ORDER — CLONAZEPAM 1 MG/1
1 TABLET ORAL NIGHTLY
Qty: 90 TABLET | Refills: 1 | Status: SHIPPED | OUTPATIENT
Start: 2024-12-16

## 2024-12-16 RX ORDER — METOPROLOL SUCCINATE 25 MG/1
25 TABLET, EXTENDED RELEASE ORAL DAILY
Qty: 90 TABLET | Refills: 3 | Status: SHIPPED | OUTPATIENT
Start: 2024-12-16

## 2024-12-16 RX ORDER — LEVOTHYROXINE SODIUM 100 UG/1
100 TABLET ORAL
Qty: 90 TABLET | Refills: 3 | Status: SHIPPED | OUTPATIENT
Start: 2024-12-16

## 2024-12-16 RX ORDER — VENLAFAXINE HYDROCHLORIDE 75 MG/1
75 CAPSULE, EXTENDED RELEASE ORAL DAILY
Qty: 90 CAPSULE | Refills: 3 | Status: SHIPPED | OUTPATIENT
Start: 2024-12-16 | End: 2025-12-16

## 2024-12-16 RX ORDER — LISDEXAMFETAMINE DIMESYLATE 40 MG/1
40 CAPSULE ORAL DAILY
Qty: 30 CAPSULE | Refills: 0 | Status: SHIPPED | OUTPATIENT
Start: 2024-12-16 | End: 2025-01-15

## 2024-12-16 NOTE — PATIENT INSTRUCTIONS
On screen, everything looks pretty good.      We would like to get some screening blood work done to check things on the inside.  Orders placed.  Feel free to get it done in [place] next week.  Just tell them to activate my orders.    Med refills sent to pharmacy.  Please continue taking them, as directed.      For constipation issues, be sure you are drinking lots of water.  The job of the colon is to reabsorb water, so if we are not taking in enough, even though our urine looks okay, we can still be constipated.  Increase the amount of fiber in your diet.  Usually I recommend fresh fruits, vegetables, whole grains, etc..  You can also supplement with OTC fiber gummies.  Our goal is to have a good, soft, bowel movement at least every other day.  If not, try adding some MiraLax daily.    We are due for colon cancer screening.  Order for screening colonoscopy placed today.  The Endoscopy  should contact you to pick a day and time that is convenient.  They will also discuss the prep.     Continue to eat a healthy diet.  Be careful with portion sizes.  Includes lots of fresh fruits, vegetables, whole grains, lean proteins.  See info below.    Keep hydrated.  Be sure to drink at least 8-10, 8 oz, glasses of water every day.    Stay active.  Try to do some sort of physical activity every day.  Nothing outrageous, just try walking for 10-15 minutes each day.

## 2024-12-16 NOTE — PROGRESS NOTES
"    [facility]       [address] [place] [address]      Phone: [phone]      Fax: [phone]    Adelso Krueger MD                Video Visit  2024        Subjective      HPI:  Mable Redding is a 54 y.o. female presents today via video for "No chief complaint on file.  ."     54-year-old female presents today via video visit to establish care, wellness exam.    Overall, feels good today.  No chest pain, shortness on breath.  No fever, chills, body aches.  No coughing, sneezing, URI type symptoms.  Appetite normal.  Bowel movements normal, for her.  No urinary issues.    Suffers from constipation.  Goes most days, but stool comes out firm, pebble like.  Drinks some water, but not a whole lot.  Does not think she was dehydrated, urine comes out looking okay.  Tried Metamucil in the past, did not really help.    Has some joint pains, muscle aches.  Mostly in the hips.  Saw Dr. Srinivasan.  Was told she was some torn tendons in her buttocks.  She was getting a procedure in January to see if that would help.      Has HTN.  Takes losartan 100mg, Toprol XL 25mg daily.  BP usually under good control.    Has HLD.  Takes Crestor 40mg, Zetia 10mg.  No issues with these medicines.    Has episodes of rosacea, comes and goes.Uses Oracea (doxy) and ivermectin cream as needed.    Has hypothyroidism.  Takes synthroid 100mcg, cytomel 10mcg.  Doing well on these.    Uses finasteride, minoxidil for hair loss.    Has ADHD, binge eating disorder.  Takes Vyvanse 40 mg most days.  Works well.      Has issues with GERD/reflux.  Uses Protonix 40 mg daily.  Works well.    Has issues with anxiety, sleep.  Takes Effexor 75 mg daily, uses Klonopin at bedtime.    PMH:  HTN.  HLD.  Hypothyroid.  GERD.  Rosacea.  Anxiety/depression.  Sleep difficulties.  ADHD.  Binge eating.  PE (in the past, triggered by hormone replacement).    PSH: .  Tonsils.  Hysterectomy.  Knee.  " Lipoma.    F MH: CAD/mi/CVA.  Dm.  Lymphoma.    Allergies: Sulfa drugs.  Aldactone.  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  T: Denies current use.  Quit in .    A: Weekends, socially.    D: Denies     Exercise: Active at home.  Has a farm.      MM2024     Colon:  FOBT 24        The following were updated and reviewed by myself in the chart: medications, past medical history, past surgical history, family history, social history, and allergies.     Medications:  Current Outpatient Medications on File Prior to Visit   Medication Sig Dispense Refill    clobetasoL (TEMOVATE) 0.05 % external solution Apply topically once daily. 50 mL 3    diclofenac (VOLTAREN) 75 MG EC tablet Take 1 tablet (75 mg total) by mouth 2 (two) times daily as needed (joint pain). 60 tablet 2    doxycycline (ORACEA) 40 mg capsule Take 1 capsule (40 mg total) by mouth once daily. 90 capsule 3    fluconazole (DIFLUCAN) 150 MG Tab Take 1 tablet (150 mg total) by mouth once daily. 3 tablet 3    ivermectin (SOOLANTRA) 1 % Crea       lisdexamfetamine (VYVANSE) 40 MG Cap Take 1 capsule (40 mg total) by mouth every morning. 30 capsule 0    loratadine (CLARITIN) 10 mg tablet TAKE 1 TABLET BY MOUTH EVERY DAY AS NEEDED FOR ALLERGY 90 tablet 2    minoxidiL (LONITEN) 2.5 MG tablet Take 1/2 (half) tablet by mouth daily. 90 tablet 1    mupirocin (BACTROBAN) 2 % ointment Apply to nostrils 2 (two) times daily. 22 g 2    [DISCONTINUED] clonazePAM (KLONOPIN) 1 MG tablet Take 1 tablet (1 mg total) by mouth every evening. 90 tablet 1    [DISCONTINUED] ezetimibe (ZETIA) 10 mg tablet Take 1 tablet (10 mg total) by mouth once daily. 90 tablet 3    [DISCONTINUED] finasteride (PROSCAR) 5 mg tablet Take 1 tablet by mouth daily 90 tablet 3    [DISCONTINUED] levothyroxine (SYNTHROID) 100 MCG tablet Take 1 tablet (100 mcg total) by mouth before breakfast. 90 tablet 3    [DISCONTINUED] liothyronine (CYTOMEL) 5 MCG Tab Take 2 tablets (10 mcg total)  by mouth once daily. 180 tablet 3    [DISCONTINUED] losartan (COZAAR) 100 MG tablet Take 1 tablet (100 mg total) by mouth every evening. 90 tablet 3    [DISCONTINUED] metoprolol succinate (TOPROL-XL) 25 MG 24 hr tablet Take 1 tablet (25 mg total) by mouth once daily. 90 tablet 3    [DISCONTINUED] minoxidiL (LONITEN) 2.5 MG tablet Take 0.5 tablets (1.25 mg total) by mouth once daily. 30 tablet 1    [DISCONTINUED] minoxidiL (LONITEN) 2.5 MG tablet Take 0.5/half tablets by mouth daily 45 tablet 3    [DISCONTINUED] pantoprazole (PROTONIX) 40 MG tablet Take 1 tablet (40 mg total) by mouth every evening. 90 tablet 3    [DISCONTINUED] rosuvastatin (CRESTOR) 40 MG Tab Take 1 tablet (40 mg total) by mouth every evening. 90 tablet 3    [DISCONTINUED] venlafaxine (EFFEXOR-XR) 75 MG 24 hr capsule Take 1 capsule (75 mg total) by mouth once daily. 90 capsule 3     Current Facility-Administered Medications on File Prior to Visit   Medication Dose Route Frequency Provider Last Rate Last Admin    triamcinolone acetonide injection 10 mg  10 mg Intradermal 1 time in Clinic/HOD         triamcinolone acetonide injection 10 mg  10 mg Intradermal 1 time in Clinic/HOD             PMHx:  Past Medical History:   Diagnosis Date    Anxiety     Bursitis     Right hip    COVID-19 11/3/2021    Depression     Digestive disorder     Herpes genitalia     History of blood clots     HPV (human papilloma virus) infection     HTN (hypertension) 5/17/2022    Lupus anticoagulant disorder     Lupus anticoagulant disorder 1/30/2019    Pulmonary embolus     Thyroid disease       Patient Active Problem List    Diagnosis Date Noted    Decreased range of hip movement 05/17/2024    Decreased strength of lower extremity 05/17/2024    Decreased functional mobility and endurance 05/17/2024    Pain 04/15/2024    Decreased range of motion 04/15/2024    Decreased muscle strength 04/15/2024    Decreased activities of daily living (ADL) 04/15/2024    Class 1 obesity due  to excess calories with serious comorbidity in adult 2023    Other hyperlipidemia 2023    Hypothyroidism 2023    Iron deficiency 2022    Depression 2022    GERD (gastroesophageal reflux disease) 2022    HTN (hypertension) 2022    Choroidal nevus of both eyes 2019    High coronary artery calcium score 2019    Family history of premature CAD 2019    Iatrogenic pulmonary embolus and infarction 2012        PSHx:  Past Surgical History:   Procedure Laterality Date    ADENOIDECTOMY      ARTHROSCOPIC CHONDROPLASTY OF KNEE JOINT Left 2022    Procedure: ARTHROSCOPY, KNEE, WITH CHONDROPLASTY;  Surgeon: Rudy Dempsey MD;  Location: [facility];  Service: Orthopedics;  Laterality: Left;  Left knee chondroplasty of the left knee medial femoral condyle and medial tibial plateau    ARTHROSCOPY OF KNEE Left 2022    Procedure: ARTHROSCOPY, KNEE;  Surgeon: Rudy Dempsey MD;  Location: [facility] OR;  Service: Orthopedics;  Laterality: Left;     SECTION      DILATION AND CURETTAGE OF UTERUS      HYSTERECTOMY  2017    HYSTEROSCOPY      KNEE ARTHROSCOPY W/ MENISCECTOMY Left 2022    Procedure: ARTHROSCOPY, KNEE, WITH MENISCECTOMY;  Surgeon: Rudy Dempsey MD;  Location: [facility] OR;  Service: Orthopedics;  Laterality: Left;  partial medial meniscectomy (approximately 60-70% left of the posterior horn) and lateral meniscectomy (small flap on anterior horn    LIPOMA RESECTION      OTHER SURGICAL HISTORY      Patient states she feel SOB in recovery and BP drops. Requests to be placed on side or sitting up.    ROBOTIC HYSTERECTOMY, WITH SALPINGECTOMY Bilateral     TONSILLECTOMY          FHx:  Family History   Problem Relation Name Age of Onset    Hyperlipidemia Mother      Coronary artery disease Father      Hyperlipidemia Father      Hypertension Father      No Known Problems Sister      No Known Problems Maternal Aunt      No Known Problems Maternal  Uncle      No Known Problems Paternal Aunt      No Known Problems Paternal Uncle      No Known Problems Maternal Grandmother      Diabetes type II Maternal Grandfather      Hypertension Paternal Grandmother      Hyperlipidemia Paternal Grandmother      Heart disease Paternal Grandmother      Lung cancer Paternal Grandmother      Lung cancer Paternal Grandfather      Amblyopia Neg Hx      Blindness Neg Hx      Cancer Neg Hx      Cataracts Neg Hx      Diabetes Neg Hx      Glaucoma Neg Hx      Macular degeneration Neg Hx      Retinal detachment Neg Hx      Strabismus Neg Hx      Stroke Neg Hx      Thyroid disease Neg Hx          Social:  Social History     Socioeconomic History    Marital status:    Tobacco Use    Smoking status: Former     Current packs/day: 0.00     Average packs/day: 0.3 packs/day for 10.0 years (2.5 ttl pk-yrs)     Types: Cigarettes     Start date:      Quit date:      Years since quittin.9     Passive exposure: Never    Smokeless tobacco: Never   Substance and Sexual Activity    Alcohol use: Yes     Comment: Socially    Drug use: No    Sexual activity: Yes     Partners: Male     Birth control/protection: See Surgical Hx        Allergies:  Review of patient's allergies indicates:   Allergen Reactions    Aldactone [spironolactone] Swelling    Remdesivir Hives    Sulfa (sulfonamide antibiotics) Edema    Trimethoprim      Other Reaction(s): SWELLING OF MUCUS MEMBRANES        ROS:  Review of Systems   Constitutional:  Negative for activity change, appetite change, chills and fever.   HENT:  Negative for congestion, postnasal drip, rhinorrhea, sore throat and trouble swallowing.    Respiratory:  Negative for cough, shortness of breath and wheezing.    Cardiovascular:  Negative for chest pain and palpitations.   Gastrointestinal:  Positive for constipation. Negative for abdominal pain, diarrhea, nausea and vomiting.   Genitourinary:  Negative for difficulty urinating.  "  Musculoskeletal:  Positive for arthralgias and myalgias.   Skin:  Negative for color change and rash.   Neurological:  Negative for speech difficulty and headaches.   All other systems reviewed and are negative.         Objective      There were no vitals taken for this visit.  Ht Readings from Last 3 Encounters:   08/23/24 5' 6" (1.676 m)   06/06/24 5' 6" (1.676 m)   04/02/24 5' 6" (1.676 m)     Wt Readings from Last 3 Encounters:   08/23/24 96.4 kg (212 lb 8.4 oz)   06/06/24 96.4 kg (212 lb 8.4 oz)   04/02/24 96.4 kg (212 lb 9.6 oz)       PHYSICAL EXAM:  Physical Exam  Constitutional:       General: She is not in acute distress.     Appearance: Normal appearance. She is not ill-appearing.   HENT:      Head: Normocephalic and atraumatic.   Pulmonary:      Effort: Pulmonary effort is normal. No respiratory distress.   Neurological:      Mental Status: She is alert.   Psychiatric:         Mood and Affect: Mood normal.         Behavior: Behavior normal.         Thought Content: Thought content normal.              LABS / IMAGING:  Recent Results (from the past 26 weeks)   CBC Auto Differential    Collection Time: 10/02/24  4:55 PM   Result Value Ref Range    WBC 5.97 3.90 - 12.70 K/uL    RBC 4.77 4.00 - 5.40 M/uL    Hemoglobin 13.4 12.0 - 16.0 g/dL    Hematocrit 42.6 37.0 - 48.5 %    MCV 89 82 - 98 fL    MCH 28.1 27.0 - 31.0 pg    MCHC 31.5 (L) 32.0 - 36.0 g/dL    RDW 13.1 11.5 - 14.5 %    Platelets 240 150 - 450 K/uL    MPV 9.5 9.2 - 12.9 fL    Immature Granulocytes 0.3 0.0 - 0.5 %    Gran # (ANC) 2.8 1.8 - 7.7 K/uL    Immature Grans (Abs) 0.02 0.00 - 0.04 K/uL    Lymph # 2.1 1.0 - 4.8 K/uL    Mono # 0.6 0.3 - 1.0 K/uL    Eos # 0.4 0.0 - 0.5 K/uL    Baso # 0.04 0.00 - 0.20 K/uL    nRBC 0 0 /100 WBC    Gran % 47.6 38.0 - 73.0 %    Lymph % 35.0 18.0 - 48.0 %    Mono % 9.7 4.0 - 15.0 %    Eosinophil % 6.7 0.0 - 8.0 %    Basophil % 0.7 0.0 - 1.9 %    Differential Method Automated    Comprehensive Metabolic Panel    " Collection Time: 10/02/24  4:55 PM   Result Value Ref Range    Sodium 139 136 - 145 mmol/L    Potassium 4.2 3.5 - 5.1 mmol/L    Chloride 105 95 - 110 mmol/L    CO2 27 23 - 29 mmol/L    Glucose 86 70 - 110 mg/dL    BUN 22 (H) 6 - 20 mg/dL    Creatinine 1.0 0.5 - 1.4 mg/dL    Calcium 9.6 8.7 - 10.5 mg/dL    Total Protein 6.6 6.0 - 8.4 g/dL    Albumin 3.9 3.5 - 5.2 g/dL    Total Bilirubin 0.4 0.1 - 1.0 mg/dL    Alkaline Phosphatase 79 55 - 135 U/L    AST 25 10 - 40 U/L    ALT 31 10 - 44 U/L    eGFR >60.0 >60 mL/min/1.73 m^2    Anion Gap 7 (L) 8 - 16 mmol/L   Iron and TIBC    Collection Time: 10/02/24  4:55 PM   Result Value Ref Range    Iron 84 30 - 160 ug/dL    Transferrin 243 200 - 375 mg/dL    TIBC 360 250 - 450 ug/dL    Saturated Iron 23 20 - 50 %   Ferritin    Collection Time: 10/02/24  4:55 PM   Result Value Ref Range    Ferritin 187 20.0 - 300.0 ng/mL         Assessment    1. Annual physical exam    2. Encounter to establish care    3. Bruxism    4. High coronary artery calcium score    5. Hypothyroidism, unspecified type    6. Palpitation    7. Hot flashes due to menopause    8. Disturbance of sleep    9. Primary hypertension    10. Gastroesophageal reflux disease with esophagitis without hemorrhage    11. Binge eating    12. Hair loss    13. Encounter for screening for diabetes mellitus    14. Encounter for HCV screening test for low risk patient    15. Encounter for screening for HIV    16. Colon cancer screening          Plan    Diagnoses and all orders for this visit:    Annual physical exam    Encounter to establish care    Bruxism  -     clonazePAM (KLONOPIN) 1 MG tablet; Take 1 tablet (1 mg total) by mouth every evening.    High coronary artery calcium score  -     ezetimibe (ZETIA) 10 mg tablet; Take 1 tablet (10 mg total) by mouth once daily.  -     rosuvastatin (CRESTOR) 40 MG Tab; Take 1 tablet (40 mg total) by mouth every evening.    Hypothyroidism, unspecified type  -     levothyroxine (SYNTHROID)  100 MCG tablet; Take 1 tablet (100 mcg total) by mouth before breakfast.  -     liothyronine (CYTOMEL) 5 MCG Tab; Take 2 tablets (10 mcg total) by mouth once daily.    Palpitation  -     metoprolol succinate (TOPROL-XL) 25 MG 24 hr tablet; Take 1 tablet (25 mg total) by mouth once daily.    Hot flashes due to menopause  -     venlafaxine (EFFEXOR-XR) 75 MG 24 hr capsule; Take 1 capsule (75 mg total) by mouth once daily.    Disturbance of sleep  -     venlafaxine (EFFEXOR-XR) 75 MG 24 hr capsule; Take 1 capsule (75 mg total) by mouth once daily.    Primary hypertension  -     losartan (COZAAR) 100 MG tablet; Take 1 tablet (100 mg total) by mouth every evening.  -     metoprolol succinate (TOPROL-XL) 25 MG 24 hr tablet; Take 1 tablet (25 mg total) by mouth once daily.  -     Lipid Panel; Future  -     TSH; Future  -     Comprehensive Metabolic Panel; Future  -     CBC Auto Differential; Future    Gastroesophageal reflux disease with esophagitis without hemorrhage  -     pantoprazole (PROTONIX) 40 MG tablet; Take 1 tablet (40 mg total) by mouth every evening.    Binge eating  -     lisdexamfetamine (VYVANSE) 40 MG Cap; Take 1 capsule (40 mg total) by mouth once daily.  -     lisdexamfetamine (VYVANSE) 40 MG Cap; Take 1 capsule (40 mg total) by mouth once daily.  -     lisdexamfetamine (VYVANSE) 40 MG Cap; Take 1 capsule (40 mg total) by mouth once daily.    Hair loss  -     finasteride (PROSCAR) 5 mg tablet; Take 1 tablet by mouth daily    Encounter for screening for diabetes mellitus  -     Hemoglobin A1C; Future    Encounter for HCV screening test for low risk patient  -     Hepatitis C Antibody; Future    Encounter for screening for HIV  -     HIV 1/2 Ag/Ab (4th Gen); Future    Colon cancer screening  -     Ambulatory referral/consult to Endo Procedure ; Future      Overall, everything looks good.      Med refills, as above.      Screening blood work, as above.  She was going to [place] on Wednesday.  She  would like to try and get the blood work done that day.      Did FOBT for colon cancer earlier this year.  With her constipation issues, we would like her to have full colonoscopy.  Order placed.    FOLLOW-UP:  Follow up in about 3 months (around 3/16/2025) for med refill.    The patient location is:  [place]  The chief complaint leading to consultation is:  Establish care, wellness exam, multiple issues as above.    Visit type: audiovisual    Face to Face time with patient: 20 minutes    30 minutes of total time spent on the encounter, which includes face to face time and non-face to face time preparing to see the patient (eg, review of tests), Obtaining and/or reviewing separately obtained history, Documenting clinical information in the electronic or other health record, Independently interpreting results (not separately reported) and communicating results to the patient/family/caregiver, or Care coordination (not separately reported). Visit today included increased complexity associated with the care of the episodic problem addressed and managing the longitudinal care of the patient due to the serious and/or complex managed problem(s).    Each patient to whom he or she provides medical services by telemedicine is:  (1) informed of the relationship between the physician and patient and the respective role of any other health care provider with respect to management of the patient; and (2) notified that he or she may decline to receive medical services by telemedicine and may withdraw from such care at any time.    Signed by:  Adelso Krueger MD

## 2024-12-17 ENCOUNTER — DOCUMENTATION ONLY (OUTPATIENT)
Dept: REHABILITATION | Facility: HOSPITAL | Age: 54
End: 2024-12-17
Payer: COMMERCIAL

## 2024-12-17 DIAGNOSIS — Z12.11 SCREEN FOR COLON CANCER: Primary | ICD-10-CM

## 2024-12-17 RX ORDER — SOD SULF/POT CHLORIDE/MAG SULF 1.479 G
12 TABLET ORAL DAILY
Qty: 24 TABLET | Refills: 0 | Status: SHIPPED | OUTPATIENT
Start: 2024-12-17 | End: 2024-12-18

## 2024-12-17 NOTE — PROGRESS NOTES
[facility]  Physical Therapy Discharge Note    Name: Mable Krishnamurthy [facility] Number: [id number]    Therapy Diagnosis:        Encounter Diagnoses   Name Primary?    Decreased range of motion of both hips Yes    Decreased strength of lower extremity      Decreased functional mobility and endurance           Physician: Daniel Srinivasan MD        Physician Orders: Physical Therapy Evaluation and Treat  Medical Diagnosis from Referral:   M25.562,G89.29 (ICD-10-CM) - Chronic pain of left knee   M25.551 (ICD-10-CM) - Right hip pain   M67.951 (ICD-10-CM) - Tendinopathy of right gluteal region   Evaluation Date: 5/17/2024      Date of Last visit: 07/05/2024  Total Visits Received: 6    ASSESSMENT      See daily note    Discharge reason: Patient has not attended therapy since 07/05/2024    Discharge FOTO Score: see daily note    Goals: see daily note    PLAN   This patient is discharged from Physical Therapy      June Peck, PT

## 2024-12-17 NOTE — TELEPHONE ENCOUNTER
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

## 2024-12-18 ENCOUNTER — LAB VISIT (OUTPATIENT)
Dept: LAB | Facility: HOSPITAL | Age: 54
End: 2024-12-18
Attending: FAMILY MEDICINE
Payer: COMMERCIAL

## 2024-12-18 DIAGNOSIS — Z11.59 ENCOUNTER FOR HCV SCREENING TEST FOR LOW RISK PATIENT: ICD-10-CM

## 2024-12-18 DIAGNOSIS — Z11.4 ENCOUNTER FOR SCREENING FOR HIV: ICD-10-CM

## 2024-12-18 DIAGNOSIS — I10 PRIMARY HYPERTENSION: ICD-10-CM

## 2024-12-18 DIAGNOSIS — Z13.1 ENCOUNTER FOR SCREENING FOR DIABETES MELLITUS: ICD-10-CM

## 2024-12-18 LAB
ALBUMIN SERPL BCP-MCNC: 3.8 G/DL (ref 3.5–5.2)
ALP SERPL-CCNC: 81 U/L (ref 40–150)
ALT SERPL W/O P-5'-P-CCNC: 36 U/L (ref 10–44)
ANION GAP SERPL CALC-SCNC: 6 MMOL/L (ref 8–16)
AST SERPL-CCNC: 28 U/L (ref 10–40)
BASOPHILS # BLD AUTO: 0.05 K/UL (ref 0–0.2)
BASOPHILS NFR BLD: 0.7 % (ref 0–1.9)
BILIRUB SERPL-MCNC: 0.7 MG/DL (ref 0.1–1)
BUN SERPL-MCNC: 16 MG/DL (ref 6–20)
CALCIUM SERPL-MCNC: 9.8 MG/DL (ref 8.7–10.5)
CHLORIDE SERPL-SCNC: 105 MMOL/L (ref 95–110)
CHOLEST SERPL-MCNC: 145 MG/DL (ref 120–199)
CHOLEST/HDLC SERPL: 2.1 {RATIO} (ref 2–5)
CO2 SERPL-SCNC: 29 MMOL/L (ref 23–29)
CREAT SERPL-MCNC: 1 MG/DL (ref 0.5–1.4)
DIFFERENTIAL METHOD BLD: NORMAL
EOSINOPHIL # BLD AUTO: 0.4 K/UL (ref 0–0.5)
EOSINOPHIL NFR BLD: 5.8 % (ref 0–8)
ERYTHROCYTE [DISTWIDTH] IN BLOOD BY AUTOMATED COUNT: 13.2 % (ref 11.5–14.5)
EST. GFR  (NO RACE VARIABLE): >60 ML/MIN/1.73 M^2
ESTIMATED AVG GLUCOSE: 111 MG/DL (ref 68–131)
GLUCOSE SERPL-MCNC: 107 MG/DL (ref 70–110)
HBA1C MFR BLD: 5.5 % (ref 4–5.6)
HCT VFR BLD AUTO: 41.5 % (ref 37–48.5)
HCV AB SERPL QL IA: NORMAL
HDLC SERPL-MCNC: 68 MG/DL (ref 40–75)
HDLC SERPL: 46.9 % (ref 20–50)
HGB BLD-MCNC: 13.5 G/DL (ref 12–16)
HIV 1+2 AB+HIV1 P24 AG SERPL QL IA: NORMAL
IMM GRANULOCYTES # BLD AUTO: 0.03 K/UL (ref 0–0.04)
IMM GRANULOCYTES NFR BLD AUTO: 0.4 % (ref 0–0.5)
LDLC SERPL CALC-MCNC: 57.6 MG/DL (ref 63–159)
LYMPHOCYTES # BLD AUTO: 1.8 K/UL (ref 1–4.8)
LYMPHOCYTES NFR BLD: 26 % (ref 18–48)
MCH RBC QN AUTO: 28.9 PG (ref 27–31)
MCHC RBC AUTO-ENTMCNC: 32.5 G/DL (ref 32–36)
MCV RBC AUTO: 89 FL (ref 82–98)
MONOCYTES # BLD AUTO: 0.7 K/UL (ref 0.3–1)
MONOCYTES NFR BLD: 9.8 % (ref 4–15)
NEUTROPHILS # BLD AUTO: 3.9 K/UL (ref 1.8–7.7)
NEUTROPHILS NFR BLD: 57.3 % (ref 38–73)
NONHDLC SERPL-MCNC: 77 MG/DL
NRBC BLD-RTO: 0 /100 WBC
PLATELET # BLD AUTO: 268 K/UL (ref 150–450)
PMV BLD AUTO: 9.4 FL (ref 9.2–12.9)
POTASSIUM SERPL-SCNC: 4.5 MMOL/L (ref 3.5–5.1)
PROT SERPL-MCNC: 6.5 G/DL (ref 6–8.4)
RBC # BLD AUTO: 4.67 M/UL (ref 4–5.4)
SODIUM SERPL-SCNC: 140 MMOL/L (ref 136–145)
T4 FREE SERPL-MCNC: 1.07 NG/DL (ref 0.71–1.51)
TRIGL SERPL-MCNC: 97 MG/DL (ref 30–150)
TSH SERPL DL<=0.005 MIU/L-ACNC: 0.27 UIU/ML (ref 0.4–4)
WBC # BLD AUTO: 6.74 K/UL (ref 3.9–12.7)

## 2024-12-18 PROCEDURE — 86803 HEPATITIS C AB TEST: CPT | Performed by: FAMILY MEDICINE

## 2024-12-18 PROCEDURE — 84443 ASSAY THYROID STIM HORMONE: CPT | Performed by: FAMILY MEDICINE

## 2024-12-18 PROCEDURE — 87389 HIV-1 AG W/HIV-1&-2 AB AG IA: CPT | Performed by: FAMILY MEDICINE

## 2024-12-18 PROCEDURE — 80053 COMPREHEN METABOLIC PANEL: CPT | Performed by: FAMILY MEDICINE

## 2024-12-18 PROCEDURE — 80061 LIPID PANEL: CPT | Performed by: FAMILY MEDICINE

## 2024-12-18 PROCEDURE — 84439 ASSAY OF FREE THYROXINE: CPT | Performed by: FAMILY MEDICINE

## 2024-12-18 PROCEDURE — 85025 COMPLETE CBC W/AUTO DIFF WBC: CPT | Performed by: FAMILY MEDICINE

## 2024-12-18 PROCEDURE — 83036 HEMOGLOBIN GLYCOSYLATED A1C: CPT | Performed by: FAMILY MEDICINE

## 2024-12-18 RX ORDER — SODIUM, POTASSIUM,MAG SULFATES 17.5-3.13G
1 SOLUTION, RECONSTITUTED, ORAL ORAL ONCE
Qty: 1 KIT | Refills: 0 | Status: SHIPPED | OUTPATIENT
Start: 2024-12-18 | End: 2024-12-18

## 2024-12-20 ENCOUNTER — PATIENT MESSAGE (OUTPATIENT)
Dept: PRIMARY CARE CLINIC | Facility: CLINIC | Age: 54
End: 2024-12-20
Payer: COMMERCIAL

## 2024-12-20 NOTE — PROGRESS NOTES
Overall, most of your labs look fine.      Blood counts look good.  Electrolytes, kidney function, and liver function are all okay.  Random glucose and A1c are in the normal ranges.  Cholesterol numbers look great!     I worry that we are over treating your thyroid.  Your TSH levels have been well below the normal ranges for about a year, year and a half.  My recommendation would be to continue the levothyroxine, but stop the Cytomel.  Then, we recheck thyroid numbers in about six months.  Your thoughts?

## 2024-12-27 ENCOUNTER — ANESTHESIA EVENT (OUTPATIENT)
Dept: ENDOSCOPY | Facility: HOSPITAL | Age: 54
End: 2024-12-27
Payer: COMMERCIAL

## 2024-12-27 NOTE — ANESTHESIA PREPROCEDURE EVALUATION
2024  Mable Redding is a 54 y.o., female.  Past Medical History:   Diagnosis Date    Anxiety     Bursitis     Right hip    COVID-19 11/3/2021    Depression     Digestive disorder     Herpes genitalia     History of blood clots     HPV (human papilloma virus) infection     HTN (hypertension) 2022    Lupus anticoagulant disorder     Lupus anticoagulant disorder 2019    Pulmonary embolus     Thyroid disease      Past Surgical History:   Procedure Laterality Date    ADENOIDECTOMY      ARTHROSCOPIC CHONDROPLASTY OF KNEE JOINT Left 2022    Procedure: ARTHROSCOPY, KNEE, WITH CHONDROPLASTY;  Surgeon: Rudy Dempsey MD;  Location: [facility];  Service: Orthopedics;  Laterality: Left;  Left knee chondroplasty of the left knee medial femoral condyle and medial tibial plateau    ARTHROSCOPY OF KNEE Left 2022    Procedure: ARTHROSCOPY, KNEE;  Surgeon: Rudy Dempsey MD;  Location: [facility] OR;  Service: Orthopedics;  Laterality: Left;     SECTION      DILATION AND CURETTAGE OF UTERUS      HYSTERECTOMY  2017    HYSTEROSCOPY      KNEE ARTHROSCOPY W/ MENISCECTOMY Left 2022    Procedure: ARTHROSCOPY, KNEE, WITH MENISCECTOMY;  Surgeon: Rudy Dempsey MD;  Location: [facility] OR;  Service: Orthopedics;  Laterality: Left;  partial medial meniscectomy (approximately 60-70% left of the posterior horn) and lateral meniscectomy (small flap on anterior horn    LIPOMA RESECTION      OTHER SURGICAL HISTORY      Patient states she feel SOB in recovery and BP drops. Requests to be placed on side or sitting up.    ROBOTIC HYSTERECTOMY, WITH SALPINGECTOMY Bilateral     TONSILLECTOMY           Pre-op Assessment    I have reviewed the Patient Summary Reports.     I have reviewed the Nursing Notes. I have reviewed the NPO Status.   I have reviewed the Medications.     Review of  Systems  Anesthesia Hx:  No problems with previous Anesthesia                Social:  Non-Smoker, Social Alcohol Use       Cardiovascular:     Hypertension   CAD                                          Pulmonary:         Hx PE               Hepatic/GI:  Bowel Prep.   GERD                Endocrine:   Hypothyroidism          Psych:  Psychiatric History  depression                Physical Exam  General: Well nourished, Cooperative, Alert and Oriented    Airway:  Mallampati: II   Mouth Opening: Normal  Tongue: Normal  Neck ROM: Normal ROM        Anesthesia Plan  Type of Anesthesia, risks & benefits discussed:    Anesthesia Type: Gen Natural Airway  Intra-op Monitoring Plan: Standard ASA Monitors  Induction:  IV  Informed Consent: Informed consent signed with the Patient and all parties understand the risks and agree with anesthesia plan.  All questions answered. Patient consented to blood products? No  ASA Score: 3  Day of Surgery Review of History & Physical: H&P Update referred to the surgeon/provider.    Ready For Surgery From Anesthesia Perspective.     .

## 2024-12-30 ENCOUNTER — HOSPITAL ENCOUNTER (OUTPATIENT)
Facility: HOSPITAL | Age: 54
Discharge: HOME | End: 2024-12-30
Attending: INTERNAL MEDICINE | Admitting: INTERNAL MEDICINE
Payer: COMMERCIAL

## 2024-12-30 ENCOUNTER — ANESTHESIA (OUTPATIENT)
Dept: ENDOSCOPY | Facility: HOSPITAL | Age: 54
End: 2024-12-30
Payer: COMMERCIAL

## 2024-12-30 VITALS
OXYGEN SATURATION: 100 % | SYSTOLIC BLOOD PRESSURE: 136 MMHG | HEART RATE: 69 BPM | RESPIRATION RATE: 20 BRPM | BODY MASS INDEX: 34.56 KG/M2 | WEIGHT: 215.06 LBS | HEIGHT: 66 IN | TEMPERATURE: 98 F | DIASTOLIC BLOOD PRESSURE: 72 MMHG

## 2024-12-30 DIAGNOSIS — Z12.11 COLON CANCER SCREENING: ICD-10-CM

## 2024-12-30 PROCEDURE — 63600175 PHARM REV CODE 636 W HCPCS: Performed by: NURSE ANESTHETIST, CERTIFIED REGISTERED

## 2024-12-30 PROCEDURE — 37000009 HC ANESTHESIA EA ADD 15 MINS: Performed by: INTERNAL MEDICINE

## 2024-12-30 PROCEDURE — 88305 TISSUE EXAM BY PATHOLOGIST: CPT | Performed by: STUDENT IN AN ORGANIZED HEALTH CARE EDUCATION/TRAINING PROGRAM

## 2024-12-30 PROCEDURE — 45385 COLONOSCOPY W/LESION REMOVAL: CPT | Mod: 33,,, | Performed by: INTERNAL MEDICINE

## 2024-12-30 PROCEDURE — D9220A PRA ANESTHESIA: Mod: ,,, | Performed by: NURSE ANESTHETIST, CERTIFIED REGISTERED

## 2024-12-30 PROCEDURE — 27201089 HC SNARE, DISP (ANY): Performed by: INTERNAL MEDICINE

## 2024-12-30 PROCEDURE — 37000008 HC ANESTHESIA 1ST 15 MINUTES: Performed by: INTERNAL MEDICINE

## 2024-12-30 PROCEDURE — 45385 COLONOSCOPY W/LESION REMOVAL: CPT | Mod: 33 | Performed by: INTERNAL MEDICINE

## 2024-12-30 RX ORDER — LIDOCAINE HYDROCHLORIDE 20 MG/ML
INJECTION, SOLUTION EPIDURAL; INFILTRATION; INTRACAUDAL; PERINEURAL
Status: DISCONTINUED | OUTPATIENT
Start: 2024-12-30 | End: 2024-12-30

## 2024-12-30 RX ORDER — PROPOFOL 10 MG/ML
VIAL (ML) INTRAVENOUS
Status: DISCONTINUED | OUTPATIENT
Start: 2024-12-30 | End: 2024-12-30

## 2024-12-30 RX ADMIN — PROPOFOL 30 MG: 10 INJECTION, EMULSION INTRAVENOUS at 12:12

## 2024-12-30 RX ADMIN — PROPOFOL 40 MG: 10 INJECTION, EMULSION INTRAVENOUS at 12:12

## 2024-12-30 RX ADMIN — PROPOFOL 80 MG: 10 INJECTION, EMULSION INTRAVENOUS at 12:12

## 2024-12-30 RX ADMIN — PROPOFOL 50 MG: 10 INJECTION, EMULSION INTRAVENOUS at 12:12

## 2024-12-30 RX ADMIN — LIDOCAINE HYDROCHLORIDE 40 MG: 20 INJECTION, SOLUTION EPIDURAL; INFILTRATION; INTRACAUDAL; PERINEURAL at 12:12

## 2024-12-30 RX ADMIN — PROPOFOL 100 MG: 10 INJECTION, EMULSION INTRAVENOUS at 12:12

## 2024-12-30 NOTE — PROVATION PATIENT INSTRUCTIONS
Discharge Summary/Instructions after an Endoscopic Procedure  Patient Name: Mable Redding  Patient MRN: [id number]  Patient YOB: 1970  Monday, December 30, 2024  Lucien Calvillo MD  Dear patient,  As a result of recent federal legislation (The Federal Cures Act), you may   receive lab or pathology results from your procedure in your [facility]   account before your physician is able to contact you. Your physician or   their representative will relay the results to you with their   recommendations at their soonest availability.  Thank you,  RESTRICTIONS:  During your procedure today, you received medications for sedation.  These   medications may affect your judgment, balance and coordination.  Therefore,   for 24 hours, you have the following restrictions:   - DO NOT drive a car, operate machinery, make legal/financial decisions,   sign important papers or drink alcohol.    ACTIVITY:  Today: no heavy lifting, straining or running due to procedural   sedation/anesthesia.  The following day: return to full activity including work.  DIET:  Eat and drink normally unless instructed otherwise.     TREATMENT FOR COMMON SIDE EFFECTS:  - Mild abdominal pain, nausea, belching, bloating or excessive gas:  rest,   eat lightly and use a heating pad.  - Sore Throat: treat with throat lozenges and/or gargle with warm salt   water.  - Because air was used during the procedure, expelling large amounts of air   from your rectum or belching is normal.  - If a bowel prep was taken, you may not have a bowel movement for 1-3 days.    This is normal.  SYMPTOMS TO WATCH FOR AND REPORT TO YOUR PHYSICIAN:  1. Abdominal pain or bloating, other than gas cramps.  2. Chest pain.  3. Back pain.  4. Signs of infection such as: chills or fever occurring within 24 hours   after the procedure.  5. Rectal bleeding, which would show as bright red, maroon, or black stools.   (A tablespoon of blood from the rectum is not serious,  especially if   hemorrhoids are present.)  6. Vomiting.  7. Weakness or dizziness.  GO DIRECTLY TO THE NEAREST EMERGENCY ROOM IF YOU HAVE ANY OF THE FOLLOWING:      Difficulty breathing              Chills and/or fever over 101 F   Persistent vomiting and/or vomiting blood   Severe abdominal pain   Severe chest pain   Black, tarry stools   Bleeding- more than one tablespoon   Any other symptom or condition that you feel may need urgent attention  Your doctor recommends these additional instructions:  If any biopsies were taken, your doctors clinic will contact you in 1 to 2   weeks with any results.  - Discharge patient to home.   - Resume regular diet.   - Continue present medications.   - Await pathology results.   - Repeat colonoscopy in 7-10 years for surveillance based on pathology   results.   - Return to referring physician as previously scheduled.   - Patient has a contact number available for emergencies.  The signs and   symptoms of potential delayed complications were discussed with the   patient.  Return to normal activities tomorrow.  Written discharge   instructions were provided to the patient.  For questions, problems or results please call your physician Lucien Calvillo MD at Work:  [phone]  If you have any questions about the above instructions, call the GI   department at [phone] or call the endoscopy unit at [phone]   from 7am until 3 pm.  [facility], EMERGENCY ROOM PHONE NUMBER:   [phone]  IF A COMPLICATION OR EMERGENCY SITUATION ARISES AND YOU ARE UNABLE TO REACH   YOUR PHYSICIAN - GO DIRECTLY TO THE EMERGENCY ROOM.  I have read or have had read to me these discharge instructions for my   procedure and have received a written copy.  I understand these   instructions and will follow-up with my physician if I have any questions.     __________________________________       _____________________________________  Nurse Signature                                           Patient/Designated   Responsible Party Signature  MD Lucien Culp MD  12/30/2024 12:23:20 PM  This report has been verified and signed electronically.  Dear patient,  As a result of recent federal legislation (The Federal Cures Act), you may   receive lab or pathology results from your procedure in your [facility]   account before your physician is able to contact you. Your physician or   their representative will relay the results to you with their   recommendations at their soonest availability.  Thank you,  PROVATION

## 2024-12-30 NOTE — TRANSFER OF CARE
"Anesthesia Transfer of Care Note    Patient: Mable Redding    Procedure(s) Performed: Procedure(s) (LRB):  COLONOSCOPY, SCREENING, LOW RISK PATIENT (N/A)    Patient location: PACU    Anesthesia Type: general    Transport from OR: Transported from OR on room air with adequate spontaneous ventilation    Post pain: adequate analgesia    Post assessment: no apparent anesthetic complications and tolerated procedure well    Post vital signs: stable    Level of consciousness: awake    Nausea/Vomiting: no nausea/vomiting    Complications: none    Transfer of care protocol was followed      Last vitals: Visit Vitals  BP (!) 146/79 (BP Location: Right arm, Patient Position: Sitting)   Pulse (!) 56   Temp 35.8 °C (96.5 °F) (Temporal)   Resp 15   Ht 5' 6" (1.676 m)   Wt 97.6 kg (215 lb 0.9 oz)   SpO2 99%   Breastfeeding No   BMI 34.71 kg/m²     "

## 2024-12-30 NOTE — H&P
Endoscopy History and Physical    PCP - Adelso Krueger MD  Referring Physician - Lucien Calvillo MD  [id number] [facility] [place] [address]      ASA - per anesthesia  Mallampati - per anesthesia  History of Anesthesia problems - no  Family history Anesthesia problems -  no   Plan of anesthesia - General    HPI  54 y.o. female    Planned Procedure: Colonoscopy  Diagnosis: screening for colon cancer  Chief Complaint: Same as above    Personnel H/o colon polyps:no  FH of colon cancer:no  Anticoagulation:no      ROS:  Constitutional: No fevers, chills, No weight loss  CV: No chest pain  Pulm: No cough, No shortness of breath  GI: see HPI    Medical History:  has a past medical history of Anxiety, Bursitis, Colon cancer screening (2024), COVID-19 (11/3/2021), Depression, Digestive disorder, Herpes genitalia, History of blood clots, HPV (human papilloma virus) infection, HTN (hypertension) (2022), Lupus anticoagulant disorder, Lupus anticoagulant disorder (2019), Pulmonary embolus, and Thyroid disease.    Surgical History:  has a past surgical history that includes Hysterectomy (2017); Tonsillectomy;  section; Dilation and curettage of uterus; Hysteroscopy; Lipoma resection; Adenoidectomy; robotic hysterectomy, with salpingectomy (Bilateral); Other surgical history; Arthroscopy of knee (Left, 2022); Knee arthroscopy w/ meniscectomy (Left, 2022); and Arthroscopic chondroplasty of knee joint (Left, 2022).    Family History: family history includes Coronary artery disease in her father; Diabetes type II in her maternal grandfather; Heart disease in her paternal grandmother; Hyperlipidemia in her father, mother, and paternal grandmother; Hypertension in her father and paternal grandmother; Lung cancer in her paternal grandfather and paternal grandmother; No Known Problems in her maternal aunt, maternal grandmother, maternal uncle, paternal aunt, paternal uncle, and  sister..    Social History:  reports that she quit smoking about 25 years ago. Her smoking use included cigarettes. She started smoking about 35 years ago. She has a 2.5 pack-year smoking history. She has never been exposed to tobacco smoke. She has never used smokeless tobacco. She reports current alcohol use. She reports that she does not use drugs.    Review of patient's allergies indicates:   Allergen Reactions    Aldactone [spironolactone] Swelling    Remdesivir Hives    Sulfa (sulfonamide antibiotics) Edema    Trimethoprim      Other Reaction(s): SWELLING OF MUCUS MEMBRANES       Medications:   Facility-Administered Medications Prior to Admission   Medication Dose Route Frequency Provider Last Rate Last Admin    triamcinolone acetonide injection 10 mg  10 mg Intradermal 1 time in Clinic/HOD         triamcinolone acetonide injection 10 mg  10 mg Intradermal 1 time in Clinic/HOD          Medications Prior to Admission   Medication Sig Dispense Refill Last Dose/Taking    clobetasoL (TEMOVATE) 0.05 % external solution Apply topically once daily. 50 mL 3     clonazePAM (KLONOPIN) 1 MG tablet Take 1 tablet (1 mg total) by mouth every evening. 90 tablet 1     diclofenac (VOLTAREN) 75 MG EC tablet Take 1 tablet (75 mg total) by mouth 2 (two) times daily as needed (joint pain). 60 tablet 2     doxycycline (ORACEA) 40 mg capsule Take 1 capsule (40 mg total) by mouth once daily. 90 capsule 3     ezetimibe (ZETIA) 10 mg tablet Take 1 tablet (10 mg total) by mouth once daily. 90 tablet 3     finasteride (PROSCAR) 5 mg tablet Take 1 tablet by mouth daily 90 tablet 3     fluconazole (DIFLUCAN) 150 MG Tab Take 1 tablet (150 mg total) by mouth once daily. 3 tablet 3     ivermectin (SOOLANTRA) 1 % Crea        levothyroxine (SYNTHROID) 100 MCG tablet Take 1 tablet (100 mcg total) by mouth before breakfast. 90 tablet 3     liothyronine (CYTOMEL) 5 MCG Tab Take 2 tablets (10 mcg total) by mouth once daily. 180 tablet 3      lisdexamfetamine (VYVANSE) 40 MG Cap Take 1 capsule (40 mg total) by mouth once daily. 30 capsule 0     [START ON 1/16/2025] lisdexamfetamine (VYVANSE) 40 MG Cap Take 1 capsule (40 mg total) by mouth once daily. 30 capsule 0     [START ON 2/16/2025] lisdexamfetamine (VYVANSE) 40 MG Cap Take 1 capsule (40 mg total) by mouth once daily. 30 capsule 0     loratadine (CLARITIN) 10 mg tablet TAKE 1 TABLET BY MOUTH EVERY DAY AS NEEDED FOR ALLERGY 90 tablet 2     losartan (COZAAR) 100 MG tablet Take 1 tablet (100 mg total) by mouth every evening. 90 tablet 3     metoprolol succinate (TOPROL-XL) 25 MG 24 hr tablet Take 1 tablet (25 mg total) by mouth once daily. 90 tablet 3     minoxidiL (LONITEN) 2.5 MG tablet Take 1/2 (half) tablet by mouth daily. 90 tablet 1     mupirocin (BACTROBAN) 2 % ointment Apply to nostrils 2 (two) times daily. 22 g 2     pantoprazole (PROTONIX) 40 MG tablet Take 1 tablet (40 mg total) by mouth every evening. 90 tablet 3     rosuvastatin (CRESTOR) 40 MG Tab Take 1 tablet (40 mg total) by mouth every evening. 90 tablet 3     venlafaxine (EFFEXOR-XR) 75 MG 24 hr capsule Take 1 capsule (75 mg total) by mouth once daily. 90 capsule 3        Physical Exam:    Vital Signs: There were no vitals filed for this visit.    General Appearance: Well appearing in no acute distress  Abdomen: Soft, non tender, non distended with normal bowel sounds, no masses    Labs:  Lab Results   Component Value Date    WBC 6.74 12/18/2024    HGB 13.5 12/18/2024    HCT 41.5 12/18/2024     12/18/2024    CHOL 145 12/18/2024    TRIG 97 12/18/2024    HDL 68 12/18/2024    ALT 36 12/18/2024    AST 28 12/18/2024     12/18/2024    K 4.5 12/18/2024     12/18/2024    CREATININE 1.0 12/18/2024    BUN 16 12/18/2024    CO2 29 12/18/2024    TSH 0.267 (L) 12/18/2024    INR 0.9 04/11/2022    HGBA1C 5.5 12/18/2024       I have explained the risks and benefits of this endoscopic procedure to the patient including but not  limited to bleeding, inflammation, infection, perforation, and death.    SEDATION PLAN: per anesthesia       History reviewed, vital signs satisfactory, cardiopulmonary status satisfactory, sedation options, risks and plans have been discussed with the patient  All their questions were answered and the patient agrees to the sedation procedures as planned and the patient is deemed an appropriate candidate for the sedation as planned.     The risks, benefits and alternatives of the procedure were discussed with the patient in detail. This discussion was had in the presence of endoscopy staff. The risks include, risks of adverse reaction to sedation requiring the use of reversal agents, bleeding requiring blood transfusion, perforation requiring surgical intervention and technical failure. Other risks include aspiration leading to respiratory distress and respiratory failure resulting in endotracheal intubation and mechanical ventilation including death. If anesthesia is being utilized for this procedure, it is up to the anesthesiologist to determine airway safety including elective endotracheal intubation. Questions were answered, they agree to proceed. There was no language barriers.       Procedure explained to patient, informed consent obtained and placed in chart.       Lucien Calvillo MD

## 2024-12-30 NOTE — ANESTHESIA POSTPROCEDURE EVALUATION
Anesthesia Post Evaluation    Patient: Mable Redding    Procedure(s) Performed: Procedure(s) (LRB):  COLONOSCOPY, SCREENING, LOW RISK PATIENT (N/A)    Final Anesthesia Type: MAC      Patient location during evaluation: PACU  Patient participation: Yes- Able to Participate  Level of consciousness: awake and alert and oriented  Post-procedure vital signs: reviewed and stable  Pain management: adequate  Airway patency: patent    PONV status at discharge: No PONV  Anesthetic complications: no      Cardiovascular status: hemodynamically stable  Respiratory status: unassisted  Hydration status: euvolemic  Follow-up not needed.              Vitals Value Taken Time   /72 12/30/24 1250   Temp 36.6 °C (97.9 °F) 12/30/24 1223   Pulse 69 12/30/24 1250   Resp 20 12/30/24 1250   SpO2 100 % 12/30/24 1250         Event Time   Out of Recovery 12:53:00         Pain/Ashlie Score: Ashlie Score: 10 (12/30/2024 12:50 PM)

## 2025-01-02 LAB
FINAL PATHOLOGIC DIAGNOSIS: NORMAL
GROSS: NORMAL
Lab: NORMAL

## 2025-01-10 ENCOUNTER — PATIENT MESSAGE (OUTPATIENT)
Dept: HEPATOLOGY | Facility: CLINIC | Age: 55
End: 2025-01-10
Payer: COMMERCIAL

## 2025-01-13 ENCOUNTER — PATIENT OUTREACH (OUTPATIENT)
Dept: ADMINISTRATIVE | Facility: HOSPITAL | Age: 55
End: 2025-01-13
Payer: COMMERCIAL

## 2025-02-13 ENCOUNTER — PROCEDURE VISIT (OUTPATIENT)
Dept: SPORTS MEDICINE | Facility: CLINIC | Age: 55
End: 2025-02-13
Payer: COMMERCIAL

## 2025-02-13 ENCOUNTER — RESEARCH ENCOUNTER (OUTPATIENT)
Dept: RESEARCH | Facility: HOSPITAL | Age: 55
End: 2025-02-13
Payer: COMMERCIAL

## 2025-02-13 DIAGNOSIS — M67.951 TENDINOPATHY OF RIGHT GLUTEAL REGION: Primary | ICD-10-CM

## 2025-02-13 DIAGNOSIS — M25.551 GREATER TROCHANTERIC PAIN SYNDROME OF RIGHT LOWER EXTREMITY: ICD-10-CM

## 2025-02-13 DIAGNOSIS — M25.551 RIGHT HIP PAIN: ICD-10-CM

## 2025-02-13 PROCEDURE — 76942 ECHO GUIDE FOR BIOPSY: CPT | Mod: S$GLB,,, | Performed by: STUDENT IN AN ORGANIZED HEALTH CARE EDUCATION/TRAINING PROGRAM

## 2025-02-13 PROCEDURE — 99499 UNLISTED E&M SERVICE: CPT | Mod: S$GLB,,, | Performed by: STUDENT IN AN ORGANIZED HEALTH CARE EDUCATION/TRAINING PROGRAM

## 2025-02-13 PROCEDURE — 27306 INCISION OF THIGH TENDON: CPT | Mod: RT,S$GLB,, | Performed by: STUDENT IN AN ORGANIZED HEALTH CARE EDUCATION/TRAINING PROGRAM

## 2025-02-13 RX ORDER — TRAMADOL HYDROCHLORIDE 50 MG/1
TABLET ORAL
Qty: 28 TABLET | Refills: 0 | Status: SHIPPED | OUTPATIENT
Start: 2025-02-13

## 2025-02-13 NOTE — PROGRESS NOTES
PROTOCOL: Taurus Gray  SUBJECT  NUMBER: [id number]      Visit: Screening  Investigator: Dr. Daniel Srinivasan     Informed Consent:      Consenting was completed in private area using the most current IRB approved version of the main Informed Consent Form (ICF) by myself. The patient was given ample time to review the consents prior to execution of the main ICF.     Prior to the ICF being signed, or any study protocol required data collection, testing, procedure, or intervention being performed, the following was done and/or discussed:?   Patient was given a copy of the ICF for review: yes?   Purpose of the study and qualifications to participate: yes???   Study design, follow up schedule, and tests or procedures done at each visit: yes??   Confidentiality and HIPAA Authorization for Release of Medical Records for the research trial/ subject's rights/research related injury: yes??   Risk, Benefits, Alternative Treatments, Compensation and Costs: yes??   Participation in the research trial is voluntary and patient may withdraw at anytime: yes??   Contact information for study related questions: yes??      Patient verbalizes understanding of the above: : yes?   Contact information for CRC and PI given to patient: : yes?   Patient able to adequately summarize: the purpose of the study, the risks associated with the study, and all procedures, testing, and follow-ups associated with the study: : yes?      Mable Redding signed the IRB approved version of the main ICF.? All pages of the consents were reviewed with the patient, and all questions answered satisfactorily. The patient signed the paper consent form.      Patient received a fully executed copy of same (copy of informed consent made and provided to patient). The original consents were scanned into electronic medical records (EPIC).     Time of Consent Execution: 7:54am     Screening was registered in [employer].      Physician assessments completed? Yes     Patient  Assessments completed? Yes.      Concomitant medications and medical history listed in the patient's electronic record were reviewed with the patient to ensure accuracy.     Tenjet procedure was conducted same day as screening. Procedure was done on right hip (Right gluteal tendinopathy). Please see Dr. Srinivasan's note for procedure details.      End of Visit:     Patient was instructed that she would complete all her follow-up questionnaires via a survey link sent to her through [employer]. Patient verbalized understanding, and has all of our contact numbers should she need to get in touch with us before that time.

## 2025-02-13 NOTE — PROCEDURES
TENJET Operative Note:    PATIENT NAME: Mable Redding    MEDICAL RECORD NUMBER: [id number]    AGE: 54 y.o.    INFORMED CONSENT: I verify that I personally obtained Mable Redding's consent which was signed and uploaded into [employer].     TIMEOUT: Audible timeout was performed at 8:37 a.m..   At this time, the team confirmed the correct patient, correct procedure and correct site with laterality. The patient's allergies were reviewed. The procedure site was marked. The procedure team checked for proper functioning of devices and supplies to be used for the procedure. The procedure team reviewed the relevant diagnostic tests pertinent to the procedure.    PHYSICIAN: Daniel Srinivasan    LOCATION: [facility], [place].      PROCEDURE: TenJet Percutaneous Tenotomy procedure for right hip gluteal tendinopathy    ANESTHESIA: local    PREOPERATIVE DIAGNOSIS: Right gluteal tendinopathy    POSTOPERATIVE DIAGNOSIS: Right gluteal tendinopathy    PROCEDURE DESCRIPTION:    The treatment area was cleaned and draped in sterile fashion. Using sterile technique, a [employer]Tur[employer] ultrasound laptop unit with a variable frequency (13.0-6.0 MHz) linear transducer was used to successfully localize the area of pathology to be treated today. A panfilo with a sterile marker was placed, on the skin, at the area of maximum tenderness. Then the treatment area was cleaned and draped in sterile fashion. A 22 gauge needle was visualized under ultrasound administering anesthetic lidocaine, to locally anesthetize the treatment area. A separate 25 gauge needle was then utilized to create a skin wheel using 1% lidocaine with epinephrine 1.5cm from the treatment area. An 11 gauge scalpel was used to make a small puncture incision in the area of the skin wheel, and ultrasound was utilized to visualize the scalpel at the target area. The 3 inch tenotomy needle was inserted into the pathologic tissue under  direct ultrasound guidance, and tenotomy was performed with degenerative tendinotic tissue removed. Upon completion, gentle compression was applied to the site with sterile gauze. Adhesive strips, occlusive dressing, and compression bandage were then applied.    Patient left the procedure room in satisfactory condition having tolerated the procedure well.    CUTTING TIME:  8:58 a.m.    TOTAL VOLUME:  2000 mL    ESTIMATED BLOOD LOSS: <5 ml    COMPLICATIONS: none    POSTOPERATIVE PLAN:   As indicated in the AVS given to the patient today post procedure.      Home exercise program given today.  At least 15 minutes were spent developing, teaching, and performing a home exercise program under the direction of Daniel Srinivasan MD.  A written summary was provided and all questions were answered.    Patient voiced understanding of these instructions.        Daniel Srinivasan MD [facility]  Primary [facility] Medicine  02/13/2025  10:35 AM

## 2025-02-13 NOTE — PATIENT INSTRUCTIONS
Assessment:  Mable Redding is a 54 y.o. female     Chief Complaint   Patient presents with    Right Hip - Pain       Encounter Diagnoses   Name Primary?    Tendinopathy of right gluteal region Yes    Greater trochanteric pain syndrome of right lower extremity     Right hip pain         Plan:  Performed percutaneous tenotomy (TENJET) procedure in office today.   Provided proper education regarding in-procedure expectations and post-procedure expectations.   Home exercise program given  15 minutes were spent developing, teaching, and performing a home exercise program.  A written summary was provided and all questions were answered. This service was performed by Jesse Jones Sports Medicine Assistant under direction of Daniel Srinivasan MD.  PT referral to [facility]  Tramadol 50 mg as needed for post-procedure pain control      TENJET POST-PROCEDURE INSTRUCTIONS        1. WOUND CARE   - Keep bandages and procedure area clean and dry for 5 days   - Avoid submerging area in water for 5 days   - You did not have any sutures or stitches placed. Steri-strips were placed over the wound. These will fall off in the shower after about one week. If they have not fallen off after the first 9 days, you can remove them yourself.        CONTACT OUR OFFICE IF:     The area become red or hot to touch     You have a fever of 100° or more (but do not wait for a response, seek immediate care)     You have increased pain or swelling     You have drainage from the treatment site     Best way to connect would be via [employer] or call [facility] at [phone]. If unable to connect, please proceed to the nearest Emergency Care Center.       2. POST-PROCEDURE PAIN CONTROL   - You may apply heat for 20 minutes as needed for discomfort   - Pain control:  Tylenol (acetaminophen), hot pack application, Tramadol as needed   - Please refrain from using anti-inflammatory medication as this can react negatively with the goal of the procedure.         3. WEEK BY WEEK SCHEDULE     Weeks 1-2     Immobilization: Sling, brace, or walking boot     Lifting restriction: Typically, no more then 2-3 lbs (can of peas) for the first 2-3 weeks     Activity/work limitations: No overuse/repetitive activity     Follow-up in office at 2 weeks    Week 3-5     Therapy: Start at the beginning of week 3     Immobilization: none     Return to work/activity: Per guidance of Physical therapist     Week 6-8     Follow-up in the office with Dr. Srinivasan     Week 7-12    Progress with your training or return to work     Typically, full results are noted at 3 months and beyond     Week 12     Follow-up with Dr. Srinivasan        Follow-up: 6 weeks or sooner if there are any problems between now and then.    Thank you for choosing [facility] and Dr. Daniel Srinivasan for your orthopedic & sports medicine care. It is our goal to provide you with exceptional care that will help keep you healthy, active, and get you back in the game.    Please do not hesitate to reach out to us via email, phone, or MyChart with any questions, concerns, or feedback.    If you are experiencing pain/discomfort ,or have questions after 5pm and would like to be connected to the [facility] on-call team, please call this number and specify which Sports Medicine provider is treating you: [phone]

## 2025-02-18 DIAGNOSIS — G47.9 DISTURBANCE OF SLEEP: ICD-10-CM

## 2025-02-18 DIAGNOSIS — N95.1 HOT FLASHES DUE TO MENOPAUSE: ICD-10-CM

## 2025-02-18 RX ORDER — VENLAFAXINE HYDROCHLORIDE 37.5 MG/1
37.5 CAPSULE, EXTENDED RELEASE ORAL DAILY
Qty: 90 CAPSULE | Refills: 3 | Status: SHIPPED | OUTPATIENT
Start: 2025-02-18 | End: 2026-02-18

## 2025-02-27 ENCOUNTER — PATIENT OUTREACH (OUTPATIENT)
Dept: ADMINISTRATIVE | Facility: HOSPITAL | Age: 55
End: 2025-02-27
Payer: COMMERCIAL

## 2025-02-27 NOTE — PROGRESS NOTES
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 female who presents today with Post-op Evaluation of the Right Hip    History of Present Illness    HPI:  Mable presents for a follow-up visit after a recent hip or leg procedure. The immediate post-procedure period was challenging, with difficulty lifting the leg and placing it in bed. Pain medication was used for two days post-procedure, and since then, the patient has been able to walk and perform necessary activities. Rehabilitation has begun with Mickey at the [facility], with the first evaluation occurring the previous Friday. Mable has returned to work but notes that prolonged sitting aggravates the condition, particularly during virtual patient visits. While there is slight improvement, it is not a complete recovery yet. Mable denies any worsening of the condition since the procedure. Mable underwent a procedure related to the hip or leg within the past few days. Pain medication was used for two days post-procedure. Mable is currently undergoing rehabilitation at the [facility] with Mickey, with the first evaluation occurring on the previous Friday.    MEDICATIONS:  - Pain medication: Used for two days following the procedure.    WORK STATUS:  - Currently working  - Prolonged sitting aggravates condition, especially during virtual visits  - Mable needs to get up during prolonged sitting  - # Chief Complaint  - Hip or gluteal tendon issues  - # HPI  - Mable presents for a follow-up visit after a recent hip or leg procedure. The immediate post-procedure period was challenging, with difficulty lifting the leg and placing it in bed. Pain medication was used for two days post-procedure, and since then, the patient has been able to walk and  perform necessary activities. Rehabilitation has begun with Mickey at the [facility], with the first evaluation occurring the previous Friday. Mable has returned to work but notes that prolonged sitting aggravates the condition, particularly during virtual patient visits. While there is slight improvement, it is not a complete recovery yet. Mable denies any worsening of the condition since the procedure.  - # Previous Treatments  - Mable underwent a procedure related to the hip or leg within the past few days. Pain medication was used for two days post-procedure. Mable is currently undergoing rehabilitation at the [facility] with Mickey, with the first evaluation occurring on the previous Friday.  - # Medications  - Pain medication: Used for two days following the procedure.  - # Work Status  - Currently working  - Prolonged sitting aggravates condition, especially during virtual visits  - Mable needs to get up during prolonged sitting        Previous HPI:  She was initially evaluated in our office on 2/5/24 for chronic bilateral R>L hip pain for many years.  She was diagnosed with right GTPS with MRI demonstrating a partial thickness gluteal tendon tear and treated with a GT cortisone injection in March 2024 which helped tremendously but only lasted for 6 weeks. She was referred to PT for hip strengthening.  When she failed to improve, she underwent right hip gluteal tendon Tenjet procedure on 2/13/25.    Past Medical History:   Past Medical History:   Diagnosis Date    Anxiety     Bursitis     Right hip    Colon cancer screening 12/30/2024    COVID-19 11/3/2021    Depression     Digestive disorder     Herpes genitalia     History of blood clots     HPV (human papilloma virus) infection     HTN (hypertension) 5/17/2022    Lupus anticoagulant disorder     Lupus anticoagulant disorder 1/30/2019    Pulmonary embolus     Thyroid disease      Past Surgical History:   Procedure Laterality Date    ADENOIDECTOMY       ARTHROSCOPIC CHONDROPLASTY OF KNEE JOINT Left 2022    Procedure: ARTHROSCOPY, KNEE, WITH CHONDROPLASTY;  Surgeon: Rudy Dempsey MD;  Location: [facility] OR;  Service: Orthopedics;  Laterality: Left;  Left knee chondroplasty of the left knee medial femoral condyle and medial tibial plateau    ARTHROSCOPY OF KNEE Left 2022    Procedure: ARTHROSCOPY, KNEE;  Surgeon: Rudy Dempsey MD;  Location: [facility] OR;  Service: Orthopedics;  Laterality: Left;     SECTION      COLONOSCOPY, SCREENING, LOW RISK PATIENT N/A 2024    Procedure: COLONOSCOPY, SCREENING, LOW RISK PATIENT;  Surgeon: Lucien Calvillo MD;  Location: [facility] ENDO;  Service: Endoscopy;  Laterality: N/A;    DILATION AND CURETTAGE OF UTERUS      HYSTERECTOMY  2017    HYSTEROSCOPY      KNEE ARTHROSCOPY W/ MENISCECTOMY Left 2022    Procedure: ARTHROSCOPY, KNEE, WITH MENISCECTOMY;  Surgeon: Rudy Dempsey MD;  Location: [facility] OR;  Service: Orthopedics;  Laterality: Left;  partial medial meniscectomy (approximately 60-70% left of the posterior horn) and lateral meniscectomy (small flap on anterior horn    LIPOMA RESECTION      OTHER SURGICAL HISTORY      Patient states she feel SOB in recovery and BP drops. Requests to be placed on side or sitting up.    ROBOTIC HYSTERECTOMY, WITH SALPINGECTOMY Bilateral     TONSILLECTOMY       Family History   Problem Relation Name Age of Onset    Hyperlipidemia Mother      Coronary artery disease Father      Hyperlipidemia Father      Hypertension Father      No Known Problems Sister      No Known Problems Maternal Aunt      No Known Problems Maternal Uncle      No Known Problems Paternal Aunt      No Known Problems Paternal Uncle      No Known Problems Maternal Grandmother      Diabetes type II Maternal Grandfather      Hypertension Paternal Grandmother      Hyperlipidemia Paternal Grandmother      Heart disease Paternal Grandmother      Lung cancer Paternal Grandmother      Lung cancer  Paternal Grandfather      Amblyopia Neg Hx      Blindness Neg Hx      Cancer Neg Hx      Cataracts Neg Hx      Diabetes Neg Hx      Glaucoma Neg Hx      Macular degeneration Neg Hx      Retinal detachment Neg Hx      Strabismus Neg Hx      Stroke Neg Hx      Thyroid disease Neg Hx       Social History     Socioeconomic History    Marital status:    Tobacco Use    Smoking status: Former     Current packs/day: 0.00     Average packs/day: 0.3 packs/day for 10.0 years (2.5 ttl pk-yrs)     Types: Cigarettes     Start date:      Quit date:      Years since quittin.1     Passive exposure: Never    Smokeless tobacco: Never   Substance and Sexual Activity    Alcohol use: Yes     Comment: Socially    Drug use: No    Sexual activity: Yes     Partners: Male     Birth control/protection: See Surgical Hx     Medication List with Changes/Refills   Current Medications    CLOBETASOL (TEMOVATE) 0.05 % EXTERNAL SOLUTION    Apply topically once daily.    CLONAZEPAM (KLONOPIN) 1 MG TABLET    Take 1 tablet (1 mg total) by mouth every evening.    DICLOFENAC (VOLTAREN) 75 MG EC TABLET    Take 1 tablet (75 mg total) by mouth 2 (two) times daily as needed (joint pain).    DOXYCYCLINE (ORACEA) 40 MG CAPSULE    Take 1 capsule (40 mg total) by mouth once daily.    EZETIMIBE (ZETIA) 10 MG TABLET    Take 1 tablet (10 mg total) by mouth once daily.    FINASTERIDE (PROSCAR) 5 MG TABLET    Take 1 tablet by mouth daily    FLUCONAZOLE (DIFLUCAN) 150 MG TAB    Take 1 tablet (150 mg total) by mouth once daily.    IVERMECTIN (SOOLANTRA) 1 % CREA        LEVOTHYROXINE (SYNTHROID) 100 MCG TABLET    Take 1 tablet (100 mcg total) by mouth before breakfast.    LIOTHYRONINE (CYTOMEL) 5 MCG TAB    Take 2 tablets (10 mcg total) by mouth once daily.    LISDEXAMFETAMINE (VYVANSE) 40 MG CAP    Take 1 capsule (40 mg total) by mouth once daily.    LISDEXAMFETAMINE (VYVANSE) 40 MG CAP    Take 1 capsule (40 mg total) by mouth once daily.     LORATADINE (CLARITIN) 10 MG TABLET    TAKE 1 TABLET BY MOUTH EVERY DAY AS NEEDED FOR ALLERGY    LOSARTAN (COZAAR) 100 MG TABLET    Take 1 tablet (100 mg total) by mouth every evening.    METOPROLOL SUCCINATE (TOPROL-XL) 25 MG 24 HR TABLET    Take 1 tablet (25 mg total) by mouth once daily.    MINOXIDIL (LONITEN) 2.5 MG TABLET    Take 1/2 (half) tablet by mouth daily.    MUPIROCIN (BACTROBAN) 2 % OINTMENT    Apply to nostrils 2 (two) times daily.    PANTOPRAZOLE (PROTONIX) 40 MG TABLET    Take 1 tablet (40 mg total) by mouth every evening.    ROSUVASTATIN (CRESTOR) 40 MG TAB    Take 1 tablet (40 mg total) by mouth every evening.    TRAMADOL (ULTRAM) 50 MG TABLET    Take 1 tablet (50 mg), by mouth, every 6-8 hours, as needed for pain    VENLAFAXINE (EFFEXOR-XR) 37.5 MG 24 HR CAPSULE    Take 1 capsule (37.5 mg total) by mouth once daily.     Review of patient's allergies indicates:   Allergen Reactions    Aldactone [spironolactone] Swelling    Remdesivir Hives    Sulfa (sulfonamide antibiotics) Edema    Trimethoprim      Other Reaction(s): SWELLING OF MUCUS MEMBRANES       Physical Exam:   There is no height or weight on file to calculate BMI.    Detailed MSK exam:     Right Hip:  Inspection: No swelling, erythema or ecchymosis.   Palpation tenderness: Greater trochanter, lateral gluteal tendon  Range of motion: 120 deg Flexion       40 deg IR       65 deg ER  Strength:  5/5 Flexion    5-/5 Abduction pain limited    5/5 Adduction  Impingement Tests: Negative LENA     Negative FADIR  Other Tests:  Negative Log Roll  Negative Circumduction  Negative Piriformis     Negative Marek's     Negative SLR   N/V Exam:  Tibial:    Normal sensory (plantar foot)  Normal motor (FHL)    Sup Peroneal:  Normal sensory (dorsal foot)  Normal motor (Peroneals)            Deep Peroneal:  Normal sensory (1st web space) Normal motor (EHL)    Sural:   Normal sensory (lateral foot)   Saphenous:   Normal sensory (medial lower leg)   Normal pedal  pulses, warm and well perfused with capillary refill < 2 sec     Imaging:    No new imaging today.       Patient Instructions   Assessment:  Mable Redding is a 54 y.o. female with a chief complaint of Post-op Evaluation of the Right Hip    Encounter Diagnoses   Name Primary?    Tendinopathy of right gluteal region Yes    Greater trochanteric pain syndrome of right lower extremity     Right hip pain       Plan:  Patient is 2 weeks s/p right hip TenJet procedure for gluteal tendinopathy.  Overall, she is doing well.  She is still having pain and discomfort at the procedure site.  She had significant pain immediately postprocedure, but that dissipated after 2 days.  Pain is worse with any kind of prolonged sitting activities, or when she is laying on it.  She has just started physical therapy.  Continue physical therapy, we should expect to see more improvement over the next month, with increased therapy/rehab.  Continue diclofenac as needed.    Follow-up:  4 weeks or sooner if there are any problems between now and then.    Thank you for choosing [facility] and Dr. Daniel Srinivasan for your orthopedic & sports medicine care. It is our goal to provide you with exceptional care that will help keep you healthy, active, and get you back in the game.    Please do not hesitate to reach out to us via email, phone, or MyChart with any questions, concerns, or feedback.    If you are experiencing pain/discomfort ,or have questions after 5pm and would like to be connected to the [facility] on-call team, please call this number and specify which Sports Medicine provider is treating you: [phone]       A copy of today's visit note has been sent to the referring provider.           Daniel Srinivasan MD  Primary Care Sports Medicine    This note was generated with the assistance of ambient listening technology. Verbal consent was obtained by the patient and  accompanying visitor(s) for the recording of patient appointment to facilitate this note. I attest to having reviewed and edited the generated note for accuracy, though some syntax or spelling errors may persist. Please contact the author of this note for any clarification.

## 2025-02-28 ENCOUNTER — CLINICAL SUPPORT (OUTPATIENT)
Dept: REHABILITATION | Facility: HOSPITAL | Age: 55
End: 2025-02-28
Attending: STUDENT IN AN ORGANIZED HEALTH CARE EDUCATION/TRAINING PROGRAM
Payer: COMMERCIAL

## 2025-02-28 DIAGNOSIS — M25.551 RIGHT HIP PAIN: ICD-10-CM

## 2025-02-28 DIAGNOSIS — R29.898 DECREASED STRENGTH OF LOWER EXTREMITY: ICD-10-CM

## 2025-02-28 DIAGNOSIS — M25.551 GREATER TROCHANTERIC PAIN SYNDROME OF RIGHT LOWER EXTREMITY: ICD-10-CM

## 2025-02-28 DIAGNOSIS — M67.951 TENDINOPATHY OF RIGHT GLUTEAL REGION: Primary | ICD-10-CM

## 2025-02-28 PROCEDURE — 97112 NEUROMUSCULAR REEDUCATION: CPT

## 2025-02-28 PROCEDURE — 97161 PT EVAL LOW COMPLEX 20 MIN: CPT

## 2025-02-28 NOTE — PROGRESS NOTES
Outpatient Rehab    Physical Therapy Evaluation    Patient Name: Mable Redding  MRN: [id number]  YOB: 1970  Encounter Date: 2/28/2025    Therapy Diagnosis:   Encounter Diagnoses   Name Primary?    Tendinopathy of right gluteal region Yes    Greater trochanteric pain syndrome of right lower extremity     Right hip pain     Decreased strength of lower extremity      Physician: Daniel Srinivasan MD    Physician Orders: Eval and Treat  Medical Diagnosis: Tendinopathy of right gluteal region [M67.951], Greater trochanteric pain syndrome of right lower extremity [M25.551], Right hip pain [M25.551]     Visit # / Visits Authorized:  1 / 1   Date of Evaluation:  2/28/2025   Insurance Authorization Period: 2/13/2025 to 3/31/2025  Plan of Care Certification:  2/28/2025 to 5/23/2025      Time In: 1014   Time Out: 1100  Total Time: 46   Total Billable Time: 45    Intake Outcome Measure for FOTO Survey    Therapist reviewed FOTO scores for Mable Redding on 2/28/2025.   FOTO report - see Media section or FOTO account episode details.     Intake Score: 61%         Subjective   History of Present Illness  Mable is a 54 y.o. female who reports to physical therapy with a chief concern of Right hip pain.       Patient reports a surgery of S/P Right hip Tenjet.          Dominant Hand: Right  History of Present Condition/Illness: Patient reports that she has had bilateral hip pain for years now, and she had a tenjet procedure with Dr Srinivasan on 2/13/2025. She reports the hip has still been hurting but not as bad and she has been sleeping slightly better. She has been trying to take it easy and she has been doing her stretching exercises and avoiding leg work outs.     Activities of Daily Living  Previously independent with activities of daily living? Yes     Currently independent with activities of daily living? Yes          Previously independent with instrumental activities of daily living? Yes      Currently independent with instrumental activities of daily living? Yes              Pain     Patient reports a current pain level of 3/10. Pain at best is reported as 3/10. Pain at worst is reported as 9/10.   Location: Lateral Right Hip  Clinical Progression (since onset): Stable  Pain Qualities: Aching, Sharp  Pain-Relieving Factors: Rest, Standing, Stretching  Pain-Aggravating Factors: Squatting, Sitting  Crossing legs or sitting with legs extended is worse       Review of Systems  Patient reports: Headache  Patient denies: Bladder Incontinence, Bowel Incontinence, Chest Pain, Dizziness, Fainting, Fever, Saddle Numbness, Shortness of Breath, Cancer History, Cardiac History, and Diabetes          Past Medical History/Physical Systems Review:   Mable Redding  has a past medical history of Anxiety, Bursitis, Colon cancer screening, COVID-19, Depression, Digestive disorder, Herpes genitalia, History of blood clots, HPV (human papilloma virus) infection, HTN (hypertension), Lupus anticoagulant disorder, Lupus anticoagulant disorder, Pulmonary embolus, and Thyroid disease.    Mable Redding  has a past surgical history that includes Hysterectomy (2017); Tonsillectomy;  section; Dilation and curettage of uterus; Hysteroscopy; Lipoma resection; Adenoidectomy; robotic hysterectomy, with salpingectomy (Bilateral); Other surgical history; Arthroscopy of knee (Left, 2022); Knee arthroscopy w/ meniscectomy (Left, 2022); Arthroscopic chondroplasty of knee joint (Left, 2022); and colonoscopy, screening, low risk patient (N/A, 2024).    Mable has a current medication list which includes the following prescription(s): clobetasol, clonazepam, diclofenac, doxycycline, ezetimibe, finasteride, fluconazole, ivermectin, levothyroxine, liothyronine, lisdexamfetamine, loratadine, losartan, metoprolol succinate, minoxidil, mupirocin, pantoprazole, rosuvastatin, tramadol, and venlafaxine, and  the following Facility-Administered Medications: triamcinolone acetonide and triamcinolone acetonide.    Review of patient's allergies indicates:   Allergen Reactions    Aldactone [spironolactone] Swelling    Remdesivir Hives    Sulfa (sulfonamide antibiotics) Edema    Trimethoprim      Other Reaction(s): SWELLING OF MUCUS MEMBRANES        Objective       Hip Range of Motion   Right Hip   Active (deg) Passive (deg) Pain   Flexion 110 120 Yes   Extension 5 15     ABduction 40 45 Yes   ADduction         External Rotation 90/90 45   Yes   External Rotation Prone 40   Yes   Internal Rotation 90/90 30 35 Yes   Internal Rotation Prone 31 35 Yes       Left Hip   Active (deg) Passive (deg) Pain   Flexion 120       Extension 15       ABduction 45       ADduction         External Rotation 90/90 45       External Rotation Prone 45       Internal Rotation 90/90 35 40 Yes   Internal Rotation Prone 35 40 Yes                      Hip Strength - Planes of Motion   Right Strength Right Pain Left Strength Left  Pain   Flexion (L2) 4- Yes 4-     Extension 4-   4-     ABduction 3+   3+     ADduction 4+   4+     Internal Rotation 4- Yes 4     External Rotation 4- Yes 4         Knee Strength   Right Strength Right Pain Left Strength Left  Pain   Flexion (S2) 5   5     Prone Flexion 5   5     Extension (L3) 5   5                   Treatment:    CPT Intervention Performed   Today Duration / Intensity   MT      TE                   NMR Patient education and HEP handout demonstration x  See patient instructions                TA                                                PLAN               CPT Codes available for Billing:   (00) minutes of Manual therapy (MT) to improve pain and ROM.  (00) minutes of Therapeutic Exercise (TE) to develop strength, endurance, range of motion, and flexibility.  (10) minutes of Neuromuscular Re-Education (NMR)  to improve: Balance, Coordination, Kinesthetic, Sense, Proprioception, and Posture.  (00) minutes  of Therapeutic Activities (TA) to improve functional performance.  Vasopneumatic Device Therapy () for management of swelling/edema. ([id number])  Unattended Electrical Stimulation (ES) for muscle performance or pain modulation.  BFR: Blood flow restriction applied during exercise      Assessment & Plan   Assessment  Mable presents with a condition of Low complexity.   Presentation of Symptoms: Stable  Will Comorbidities Impact Care: No       Functional Limitations: Activity tolerance, Carrying objects, Completing self-care activities, Completing work/school activities, Functional mobility, Pain with ADLs/IADLs, Participating in sports, Range of motion, Squatting, Standing tolerance, Getting off the floor, Bed mobility  Impairments: Abnormal or restricted range of motion, Activity intolerance, Impaired physical strength, Pain with functional activity  Personal Factors Affecting Prognosis: Schedule, Pain    Patient Goal for Therapy (PT): To decreased pain and improve overall function with normal ADL's and IADL's  Prognosis: Good    Plan  From a physical therapy perspective, the patient would benefit from: Skilled Rehab Services    Planned therapy interventions include: Therapeutic exercise, Therapeutic activities, Neuromuscular re-education, Manual therapy, ADLs/IADLs, Aquatic therapy, Gait training, Sensory integration, Wound care, and Other (Comment). virtual visits, dry needling, modalities, electrical stimulation (IFC, Pre-Mod, Attended with Functional Dry Needling),Cervical/Lumbar Traction, Electrical Stimulation IFC/NMES, Moist Heat/ Ice, Patient Education, and Self Care          Visit Frequency: 2 times Per Week for 12 Weeks.       This plan was discussed with Patient.   Discussion participants: Agreed Upon Plan of Care             Patient's spiritual, cultural, and educational needs considered and patient agreeable to plan of care and goals.     Education  Education was done with Patient. The patient's learning  style includes Demonstration, Listening, and Pictures/video. The patient Demonstrates understanding and Verbalizes understanding.         Education provided: PURPOSE: Patient educated on the impairments noted above and the effects of physical therapy intervention to improve overall condition and QOL.  EXERCISE: Patient was educated on all the above exercise prior/during/after for proper posture, positioning, and execution for safe performance with home exercise program.  STRENGTH: Patient educated on the importance of improved core and extremity strength in order to improve alignment of the spine and extremities with static positions and dynamic movement.  Written Home Exercises Provided: yes. Exercises were reviewed and Mable was able to demonstrate them prior to the end of the session.  Mable demonstrated good  understanding of the education provided. See EMR under Patient Instructions for exercises provided during therapy sessions.        Goals:   Active       LTG       Pt will report worst pain of 1-2/10 in order to progress toward max functional ability and improve quality of life                Start:  02/28/25    Expected End:  05/23/25            Patient will demonstrate improved function as indicated by a score of greater than or equal to 67 out of 100 on FOTO.                Start:  02/28/25    Expected End:  05/23/25            Patient will improve strength to at least 4+/5 grossly,  in order to improve functional independence and quality of life.         Start:  02/28/25    Expected End:  05/23/25            Patient will improve Hip IR/ER ROM to 45  degrees in order to progress towards independence with functional activities.         Start:  02/28/25    Expected End:  05/23/25               STG       Pt will report worst pain of 5-6/10 in order to progress toward max functional ability and improve quality of life                Start:  02/28/25    Expected End:  04/11/25            Patient will  demonstrate improved function as indicated by a score of greater than or equal to 63 out of 100 on FOTO.                Start:  02/28/25    Expected End:  04/11/25            Patient will improve strength by 1/2 a grade, in order to progress towards independence with functional activities.                Start:  02/28/25    Expected End:  04/11/25            Patient will improve Hip IR/ER ROM to 40  degrees in order to progress towards independence with functional activities.         Start:  02/28/25    Expected End:  04/11/25                Ayan Kaplan PT

## 2025-03-03 ENCOUNTER — OFFICE VISIT (OUTPATIENT)
Dept: SPORTS MEDICINE | Facility: CLINIC | Age: 55
End: 2025-03-03
Payer: COMMERCIAL

## 2025-03-03 DIAGNOSIS — M67.951 TENDINOPATHY OF RIGHT GLUTEAL REGION: Primary | ICD-10-CM

## 2025-03-03 DIAGNOSIS — M25.551 GREATER TROCHANTERIC PAIN SYNDROME OF RIGHT LOWER EXTREMITY: ICD-10-CM

## 2025-03-03 DIAGNOSIS — M25.551 RIGHT HIP PAIN: ICD-10-CM

## 2025-03-03 PROCEDURE — 99024 POSTOP FOLLOW-UP VISIT: CPT | Mod: S$GLB,,, | Performed by: STUDENT IN AN ORGANIZED HEALTH CARE EDUCATION/TRAINING PROGRAM

## 2025-03-03 PROCEDURE — 99999 PR PBB SHADOW E&M-EST. PATIENT-LVL IV: CPT | Mod: PBBFAC,,, | Performed by: STUDENT IN AN ORGANIZED HEALTH CARE EDUCATION/TRAINING PROGRAM

## 2025-03-03 NOTE — PATIENT INSTRUCTIONS
Assessment:  Mable Redding is a 54 y.o. female with a chief complaint of Post-op Evaluation of the Right Hip    Encounter Diagnoses   Name Primary?    Tendinopathy of right gluteal region Yes    Greater trochanteric pain syndrome of right lower extremity     Right hip pain       Plan:  Patient is 2 weeks s/p right hip TenJet procedure for gluteal tendinopathy.  Overall, she is doing well.  She is still having pain and discomfort at the procedure site.  She had significant pain immediately postprocedure, but that dissipated after 2 days.  Pain is worse with any kind of prolonged sitting activities, or when she is laying on it.  She has just started physical therapy.  Continue physical therapy, we should expect to see more improvement over the next month, with increased therapy/rehab.  Continue diclofenac as needed.    Follow-up:  4 weeks or sooner if there are any problems between now and then.    Thank you for choosing [facility] and Dr. Daniel Srinivasan for your orthopedic & sports medicine care. It is our goal to provide you with exceptional care that will help keep you healthy, active, and get you back in the game.    Please do not hesitate to reach out to us via email, phone, or MyChart with any questions, concerns, or feedback.    If you are experiencing pain/discomfort ,or have questions after 5pm and would like to be connected to the [facility] on-call team, please call this number and specify which Sports Medicine provider is treating you: [phone]

## 2025-03-05 ENCOUNTER — PATIENT MESSAGE (OUTPATIENT)
Dept: REHABILITATION | Facility: HOSPITAL | Age: 55
End: 2025-03-05

## 2025-03-05 ENCOUNTER — CLINICAL SUPPORT (OUTPATIENT)
Dept: REHABILITATION | Facility: HOSPITAL | Age: 55
End: 2025-03-05
Attending: STUDENT IN AN ORGANIZED HEALTH CARE EDUCATION/TRAINING PROGRAM
Payer: COMMERCIAL

## 2025-03-05 DIAGNOSIS — M67.951 TENDINOPATHY OF RIGHT GLUTEAL REGION: ICD-10-CM

## 2025-03-05 DIAGNOSIS — M25.551 GREATER TROCHANTERIC PAIN SYNDROME OF RIGHT LOWER EXTREMITY: ICD-10-CM

## 2025-03-05 DIAGNOSIS — M25.551 RIGHT HIP PAIN: ICD-10-CM

## 2025-03-05 DIAGNOSIS — R29.898 DECREASED STRENGTH OF LOWER EXTREMITY: Primary | ICD-10-CM

## 2025-03-05 PROCEDURE — 97110 THERAPEUTIC EXERCISES: CPT | Performed by: PHYSICAL THERAPIST

## 2025-03-05 PROCEDURE — 97112 NEUROMUSCULAR REEDUCATION: CPT | Performed by: PHYSICAL THERAPIST

## 2025-03-05 NOTE — PROGRESS NOTES
Outpatient Rehab    Physical Therapy Visit    Patient Name: Mable Redding  MRN: [id number]  YOB: 1970  Encounter Date: 3/5/2025    Therapy Diagnosis:   Encounter Diagnoses   Name Primary?    Decreased strength of lower extremity Yes    Tendinopathy of right gluteal region     Greater trochanteric pain syndrome of right lower extremity     Right hip pain      Physician: Daniel Srinivasan MD    Physician Orders: Eval and Treat  Medical Diagnosis: Tendinopathy of right gluteal region [M67.951], Greater trochanteric pain syndrome of right lower extremity [M25.551], Right hip pain [M25.551]      Visit # / Visits Authorized:  1 / 12 (+1)   Date of Evaluation:  2/28/2025   Insurance Authorization Period: 2/13/2025 to 3/31/2025  Plan of Care Certification:  2/28/2025 to 3/31/25       Time In: 1730   Time Out: 1830  Total Time: 60   Total Billable Time: 40    FOTO:  Intake Score:  %  Survey Score 1:  %  Survey Score 2:  %         Subjective   Pt reorts feeling good at start of therapy, she does report 4/10 pain after leg press today but resolved after LTR and no increased hip pain at end of tx session..  Pain reported as 4/10.      Objective            Treatment:     CPT Intervention Performed   Today Duration / Intensity   MT               TE Recumbent bike/ or elliptical to start  x 6  min     LTR x 3 min     Sit to stand with band around Knees (airex in chair to decrease knee pain) x 10x/ 3 sets pink loop band at knees     Leg Press feet high for more glute activation  x 115# 10x/ 3 sets (4/10 after - decrease next visit)         NMR  Bridges x 10x/ 3 sets     Standing hip abduction ( if sidelying is not tolerable or pain increases above 4/10 ) x 10x/each with cues      Clamshell isometric holds (Supine if sidelying is not tolerable)  x Belt supine 3 min, 5 s hold 1 s rest     S/L clamshells x  10x/ 2 sets     Isometric Hip adduction  x Yellow ball 3 min; 5 s hold 2 s rest     Prone Hip extension  pillow under hips  x 10x/ 3 sets   TA                                       PLAN  consider core stability                CPT Codes available for Billing:   (00) minutes of Manual therapy (MT) to improve pain and ROM.  (15) minutes of Therapeutic Exercise (TE) to develop strength, endurance, range of motion, and flexibility.  (25) minutes of Neuromuscular Re-Education (NMR)  to improve: Balance, Coordination, Kinesthetic, Sense, Proprioception, and Posture.  (00) minutes of Therapeutic Activities (TA) to improve functional performance.  Vasopneumatic Device Therapy () for management of swelling/edema. (45445)  Unattended Electrical Stimulation (ES) for muscle performance or pain modulation.  BFR: Blood flow restriction applied during exercise    Assessment & Plan   Assessment: Patient tolerated all treatment well and was able to complete program as noted for initial treatment session. Cues for all interventions for correct technique and to avoid substitution patterns. Added airex to chair to avoid knee pain today. Cues in standing hip ABD for trunk control and avoiding trunk lean. Encouraged HEP       Patient will continue to benefit from skilled outpatient physical therapy to address the deficits listed in the problem list box on initial evaluation, provide pt/family education and to maximize pt's level of independence in the home and community environment.     Patient's spiritual, cultural, and educational needs considered and patient agreeable to plan of care and goals.           Plan: Monitor response to today's treatment session and progress with Physical Therapy plan of care as indicated.    Goals:   Active       LTG       Pt will report worst pain of 1-2/10 in order to progress toward max functional ability and improve quality of life                Start:  02/28/25    Expected End:  05/23/25            Patient will demonstrate improved function as indicated by a score of greater than or equal to 67 out of  100 on FOTO.                Start:  02/28/25    Expected End:  05/23/25            Patient will improve strength to at least 4+/5 grossly,  in order to improve functional independence and quality of life.         Start:  02/28/25    Expected End:  05/23/25            Patient will improve Hip IR/ER ROM to 45  degrees in order to progress towards independence with functional activities.         Start:  02/28/25    Expected End:  05/23/25               STG       Pt will report worst pain of 5-6/10 in order to progress toward max functional ability and improve quality of life                Start:  02/28/25    Expected End:  04/11/25            Patient will demonstrate improved function as indicated by a score of greater than or equal to 63 out of 100 on FOTO.                Start:  02/28/25    Expected End:  04/11/25            Patient will improve strength by 1/2 a grade, in order to progress towards independence with functional activities.                Start:  02/28/25    Expected End:  04/11/25            Patient will improve Hip IR/ER ROM to 40  degrees in order to progress towards independence with functional activities.         Start:  02/28/25    Expected End:  04/11/25                Katiuska Quijano, PT

## 2025-03-06 ENCOUNTER — PATIENT MESSAGE (OUTPATIENT)
Dept: ADMINISTRATIVE | Facility: HOSPITAL | Age: 55
End: 2025-03-06
Payer: COMMERCIAL

## 2025-03-14 ENCOUNTER — CLINICAL SUPPORT (OUTPATIENT)
Dept: REHABILITATION | Facility: HOSPITAL | Age: 55
End: 2025-03-14
Attending: STUDENT IN AN ORGANIZED HEALTH CARE EDUCATION/TRAINING PROGRAM
Payer: COMMERCIAL

## 2025-03-14 DIAGNOSIS — M25.551 RIGHT HIP PAIN: ICD-10-CM

## 2025-03-14 DIAGNOSIS — R29.898 DECREASED STRENGTH OF LOWER EXTREMITY: Primary | ICD-10-CM

## 2025-03-14 DIAGNOSIS — M67.951 TENDINOPATHY OF RIGHT GLUTEAL REGION: ICD-10-CM

## 2025-03-14 DIAGNOSIS — M25.551 GREATER TROCHANTERIC PAIN SYNDROME OF RIGHT LOWER EXTREMITY: ICD-10-CM

## 2025-03-14 PROCEDURE — 97140 MANUAL THERAPY 1/> REGIONS: CPT

## 2025-03-14 PROCEDURE — 97112 NEUROMUSCULAR REEDUCATION: CPT

## 2025-03-14 PROCEDURE — 97110 THERAPEUTIC EXERCISES: CPT

## 2025-03-14 PROCEDURE — 97530 THERAPEUTIC ACTIVITIES: CPT

## 2025-03-18 NOTE — PROGRESS NOTES
Outpatient Rehab    Physical Therapy Visit    Patient Name: Mable Redding  MRN: [id number]  YOB: 1970  Encounter Date: 3/14/2025    Therapy Diagnosis:   Encounter Diagnoses   Name Primary?    Decreased strength of lower extremity Yes    Tendinopathy of right gluteal region     Greater trochanteric pain syndrome of right lower extremity     Right hip pain      Physician: Daniel Srinivasan MD    Physician Orders: Eval and Treat  Medical Diagnosis: Tendinopathy of right gluteal region  Greater trochanteric pain syndrome of right lower extremity  Right hip pain    Visit # / Visits Authorized:  2 / 12  Date of Evaluation: 2/28/2025   Insurance Authorization Period: 2/28/2025 to 3/31/2025  Plan of Care Certification:  2/28/2025 to 3/31/25       Time In: 1000   Time Out: 1105  Total Time: 65   Total Billable Time: 54    FOTO:  Intake Score:  %  Survey Score 1:  %  Survey Score 2:  %         Subjective   She continues to have pain in the lateral hip and notes she limps a lot, especially when she first gets up after sitting for a prolonged period.         Objective            Treatment:      CPT Intervention Performed   Today Duration / Intensity   MT  soft tissue mobilization  x  R glutes and piriformis       Joint mobilizations x R long axis distraction of hip    TE Recumbent bike/ or elliptical to start  x 5  min     LTR x 3 min     Piriformis stretch - seated figure 4  x 2x30s b      Foam rolling glutes  x 3 mins, *discontinue next      Lacrosse ball glute and piriformis release  NEXT     NMR  Single leg Bridges x 10x/ 3 sets b      Standing hip abduction ( if side lying is not tolerable or pain increases above 4/10 ) x 10x/each with cues      Side lying clamshells x 3x10 b      Seated hip IR  x Red band 3x10      Isometric Hip adduction    Yellow ball 3 min; 5 s hold 2 s rest     Prone Hip extension with knee flexed - pillow under abdomen x 10x/ 3 sets b      Single leg stance  x 3x30s b    TA  Side stepping   Red band at ankles, 3 laps along mirror       Leg Press feet high for more glute activation  x 75# 10x/ 3 sets     Sit to stand with band around Knees  x 24 inch box, 10x/ 3 sets pink loop at knees   PLAN  consider core stability                CPT Codes available for Billing:   (10) minutes of Manual therapy (MT) to improve pain and ROM.  (14) minutes of Therapeutic Exercise (TE) to develop strength, endurance, range of motion, and flexibility.  (22) minutes of Neuromuscular Re-Education (NMR)  to improve: Balance, Coordination, Kinesthetic, Sense, Proprioception, and Posture.  (08) minutes of Therapeutic Activities (TA) to improve functional performance.  Vasopneumatic Device Therapy () for management of swelling/edema. (25014)  Unattended Electrical Stimulation (ES) for muscle performance or pain modulation.  BFR: Blood flow restriction applied during exercise      Assessment & Plan   Assessment: Patient tolerated session well today.  Incorporated soft tissue mobilization today with significant reduction in pain reported by pt as well as visible reduction in limp noted following. Attempted to incorporate foam rolling in order to teach patient to self-release the muscles independently; however, pt did have difficulty getting into position for intervention. Decreased resistance with leg press today in order to minimize adverse symptoms following intervention; pt notes no pain following       Patient will continue to benefit from skilled outpatient physical therapy to address the deficits listed in the problem list box on initial evaluation, provide pt/family education and to maximize pt's level of independence in the home and community environment.     Patient's spiritual, cultural, and educational needs considered and patient agreeable to plan of care and goals.           Plan: Continue Plan of Care (POC) and progress per patient tolerance. See treatment section for details on planned  progressions next session.    Goals:   Active       LTG       Pt will report worst pain of 1-2/10 in order to progress toward max functional ability and improve quality of life          (Progressing)       Start:  02/28/25    Expected End:  05/23/25            Patient will demonstrate improved function as indicated by a score of greater than or equal to 67 out of 100 on FOTO.          (Progressing)       Start:  02/28/25    Expected End:  05/23/25            Patient will improve strength to at least 4+/5 grossly,  in order to improve functional independence and quality of life.   (Progressing)       Start:  02/28/25    Expected End:  05/23/25            Patient will improve Hip IR/ER ROM to 45  degrees in order to progress towards independence with functional activities.   (Progressing)       Start:  02/28/25    Expected End:  05/23/25               STG       Pt will report worst pain of 5-6/10 in order to progress toward max functional ability and improve quality of life          (Progressing)       Start:  02/28/25    Expected End:  04/11/25            Patient will demonstrate improved function as indicated by a score of greater than or equal to 63 out of 100 on FOTO.          (Progressing)       Start:  02/28/25    Expected End:  04/11/25            Patient will improve strength by 1/2 a grade, in order to progress towards independence with functional activities.          (Progressing)       Start:  02/28/25    Expected End:  04/11/25            Patient will improve Hip IR/ER ROM to 40  degrees in order to progress towards independence with functional activities.   (Progressing)       Start:  02/28/25    Expected End:  04/11/25                Ladan Pro, PT

## 2025-03-21 ENCOUNTER — CLINICAL SUPPORT (OUTPATIENT)
Dept: REHABILITATION | Facility: HOSPITAL | Age: 55
End: 2025-03-21
Attending: STUDENT IN AN ORGANIZED HEALTH CARE EDUCATION/TRAINING PROGRAM
Payer: COMMERCIAL

## 2025-03-21 DIAGNOSIS — M25.551 RIGHT HIP PAIN: ICD-10-CM

## 2025-03-21 DIAGNOSIS — M67.951 TENDINOPATHY OF RIGHT GLUTEAL REGION: ICD-10-CM

## 2025-03-21 DIAGNOSIS — M25.551 GREATER TROCHANTERIC PAIN SYNDROME OF RIGHT LOWER EXTREMITY: ICD-10-CM

## 2025-03-21 DIAGNOSIS — R29.898 DECREASED STRENGTH OF LOWER EXTREMITY: Primary | ICD-10-CM

## 2025-03-21 PROCEDURE — 97110 THERAPEUTIC EXERCISES: CPT

## 2025-03-21 PROCEDURE — 97140 MANUAL THERAPY 1/> REGIONS: CPT

## 2025-03-21 PROCEDURE — 97530 THERAPEUTIC ACTIVITIES: CPT

## 2025-03-21 PROCEDURE — 97112 NEUROMUSCULAR REEDUCATION: CPT

## 2025-03-21 NOTE — PROGRESS NOTES
Outpatient Rehab    Physical Therapy Visit    Patient Name: Mable Redding  MRN: [id number]  YOB: 1970  Encounter Date: 3/21/2025    Therapy Diagnosis:   Encounter Diagnoses   Name Primary?    Decreased strength of lower extremity Yes    Tendinopathy of right gluteal region     Greater trochanteric pain syndrome of right lower extremity     Right hip pain        Physician: Daniel Srinivasan MD    Physician Orders: Eval and Treat  Medical Diagnosis: Tendinopathy of right gluteal region  Greater trochanteric pain syndrome of right lower extremity  Right hip pain    Visit # / Visits Authorized:  3 / 12  Date of Evaluation: 2/28/2025   Insurance Authorization Period: 2/28/2025 to 3/31/2025  Plan of Care Certification:  2/28/2025 to 3/31/25       Time In: 1530   Time Out: 1630  Total Time: 60   Total Billable Time: 54    FOTO:  Intake Score:  %  Survey Score 1:  %  Survey Score 2:  %         Subjective   She was very sick these last couple of days so she has been feeling a little more fatigued and sore..  Pain reported as 3/10. Crossing legs or sitting with legs extended is worse     Objective            Treatment:      CPT Intervention Performed   Today Duration / Intensity   MT  soft tissue mobilization  x  R glutes and piriformis       Joint mobilizations x R long axis distraction of hip    TE Recumbent bike/ or elliptical to start  x 5  min     LTR x 3 min     Piriformis stretch - seated figure 4  x 2x30s b             Lacrosse ball glute and piriformis release  NEXT     NMR  Single leg Bridges x 10x/ 3 sets b      Standing hip abduction ( if side lying is not tolerable or pain increases above 4/10 ) x 10x/each with cues      Side lying clamshells x 3x10 b      Seated hip IR  x Red band 3x10      Isometric Hip adduction    Yellow ball 3 min; 5 s hold 2 s rest     Prone Hip extension with knee flexed - pillow under abdomen x 10x/ 3 sets b      Single leg stance  x 3x30s b    TA Side stepping    Red band at ankles, 3 laps along mirror       Leg Press feet high for more glute activation  x 75# 10x/ 3 sets     Sit to stand with band around Knees  x 24 inch box, 10x/ 3 sets pink loop at knees   PLAN  consider core stability                CPT Codes available for Billing:   (10) minutes of Manual therapy (MT) to improve pain and ROM.  (14) minutes of Therapeutic Exercise (TE) to develop strength, endurance, range of motion, and flexibility.  (22) minutes of Neuromuscular Re-Education (NMR)  to improve: Balance, Coordination, Kinesthetic, Sense, Proprioception, and Posture.  (08) minutes of Therapeutic Activities (TA) to improve functional performance.  Vasopneumatic Device Therapy () for management of swelling/edema. (52957)  Unattended Electrical Stimulation (ES) for muscle performance or pain modulation.  BFR: Blood flow restriction applied during exercise      Assessment & Plan   Assessment: Patient tolerated this session well we continued with all progressions from last visit with improved resistance tolerance to sidelying exercises. Pain levels were kept at 4/10 below on VAS. Patient needed slight verbal and tactile cuing for lumbopelvic coordination with hip extension activites like bridges.  Evaluation/Treatment Tolerance: Patient tolerated treatment well    Patient will continue to benefit from skilled outpatient physical therapy to address the deficits listed in the problem list box on initial evaluation, provide pt/family education and to maximize pt's level of independence in the home and community environment.     Patient's spiritual, cultural, and educational needs considered and patient agreeable to plan of care and goals.     Education  Education was done with Patient. The patient's learning style includes Demonstration and Pictures/video. The patient Demonstrates understanding and Verbalizes understanding.         Education provided: PURPOSE: Patient educated on the impairments noted above and  the effects of physical therapy intervention to improve overall condition and QOL.  EXERCISE: Patient was educated on all the above exercise prior/during/after for proper posture, positioning, and execution for safe performance with home exercise program.  STRENGTH: Patient educated on the importance of improved core and extremity strength in order to improve alignment of the spine and extremities with static positions and dynamic movement.  Written Home Exercises Provided: yes. Exercises were reviewed and Patient was able to demonstrate them prior to the end of the session. Patient demonstrated good  understanding of the education provided. See EMR under Patient Instructions for exercises provided during therapy sessions.       Plan: Continue Plan of Care (POC) and progress per patient tolerance. See treatment section for details on planned progressions next session.    Goals:   Active       LTG       Pt will report worst pain of 1-2/10 in order to progress toward max functional ability and improve quality of life          (Progressing)       Start:  02/28/25    Expected End:  05/23/25            Patient will demonstrate improved function as indicated by a score of greater than or equal to 67 out of 100 on FOTO.          (Progressing)       Start:  02/28/25    Expected End:  05/23/25            Patient will improve strength to at least 4+/5 grossly,  in order to improve functional independence and quality of life.   (Progressing)       Start:  02/28/25    Expected End:  05/23/25            Patient will improve Hip IR/ER ROM to 45  degrees in order to progress towards independence with functional activities.   (Progressing)       Start:  02/28/25    Expected End:  05/23/25               STG       Pt will report worst pain of 5-6/10 in order to progress toward max functional ability and improve quality of life          (Progressing)       Start:  02/28/25    Expected End:  04/11/25            Patient will demonstrate  improved function as indicated by a score of greater than or equal to 63 out of 100 on FOTO.          (Progressing)       Start:  02/28/25    Expected End:  04/11/25            Patient will improve strength by 1/2 a grade, in order to progress towards independence with functional activities.          (Progressing)       Start:  02/28/25    Expected End:  04/11/25            Patient will improve Hip IR/ER ROM to 40  degrees in order to progress towards independence with functional activities.   (Progressing)       Start:  02/28/25    Expected End:  04/11/25                Ayan Kaplan PT

## 2025-03-27 ENCOUNTER — PATIENT MESSAGE (OUTPATIENT)
Dept: REHABILITATION | Facility: HOSPITAL | Age: 55
End: 2025-03-27
Payer: COMMERCIAL

## 2025-03-31 ENCOUNTER — PATIENT OUTREACH (OUTPATIENT)
Dept: ADMINISTRATIVE | Facility: HOSPITAL | Age: 55
End: 2025-03-31
Payer: COMMERCIAL

## 2025-04-09 ENCOUNTER — PATIENT OUTREACH (OUTPATIENT)
Dept: ADMINISTRATIVE | Facility: HOSPITAL | Age: 55
End: 2025-04-09
Payer: COMMERCIAL

## 2025-05-30 DIAGNOSIS — B37.31 VAGINAL CANDIDIASIS: ICD-10-CM

## 2025-05-30 DIAGNOSIS — R21 RASH: Primary | ICD-10-CM

## 2025-05-30 RX ORDER — DOXYCYCLINE 100 MG/1
100 CAPSULE ORAL 2 TIMES DAILY
Qty: 28 CAPSULE | Refills: 1 | Status: SHIPPED | OUTPATIENT
Start: 2025-05-30

## 2025-05-30 RX ORDER — FLUCONAZOLE 150 MG/1
150 TABLET ORAL DAILY
Qty: 3 TABLET | Refills: 3 | Status: SHIPPED | OUTPATIENT
Start: 2025-05-30

## 2025-05-30 RX ORDER — CLOTRIMAZOLE AND BETAMETHASONE DIPROPIONATE 10; .64 MG/G; MG/G
CREAM TOPICAL 2 TIMES DAILY
Qty: 15 G | Refills: 2 | Status: SHIPPED | OUTPATIENT
Start: 2025-05-30

## 2025-07-01 DIAGNOSIS — R93.1 HIGH CORONARY ARTERY CALCIUM SCORE: Primary | ICD-10-CM

## 2025-07-01 DIAGNOSIS — Z82.49 FAMILY HISTORY OF PREMATURE CAD: ICD-10-CM

## 2025-07-03 ENCOUNTER — TELEPHONE (OUTPATIENT)
Dept: CARDIOLOGY | Facility: CLINIC | Age: 55
End: 2025-07-03

## 2025-07-03 NOTE — TELEPHONE ENCOUNTER
Contacted patient in reference to conforming appt for Tuesday

## 2025-07-08 ENCOUNTER — HOSPITAL ENCOUNTER (OUTPATIENT)
Dept: CARDIOLOGY | Facility: HOSPITAL | Age: 55
Discharge: HOME | End: 2025-07-08
Attending: INTERNAL MEDICINE
Payer: COMMERCIAL

## 2025-07-08 ENCOUNTER — OFFICE VISIT (OUTPATIENT)
Dept: CARDIOLOGY | Facility: CLINIC | Age: 55
End: 2025-07-08
Payer: COMMERCIAL

## 2025-07-08 VITALS
HEART RATE: 69 BPM | HEIGHT: 66 IN | SYSTOLIC BLOOD PRESSURE: 120 MMHG | WEIGHT: 221.81 LBS | OXYGEN SATURATION: 98 % | BODY MASS INDEX: 35.65 KG/M2 | DIASTOLIC BLOOD PRESSURE: 80 MMHG

## 2025-07-08 DIAGNOSIS — R93.1 HIGH CORONARY ARTERY CALCIUM SCORE: ICD-10-CM

## 2025-07-08 DIAGNOSIS — E66.09 CLASS 1 OBESITY DUE TO EXCESS CALORIES WITH SERIOUS COMORBIDITY AND BODY MASS INDEX (BMI) OF 33.0 TO 33.9 IN ADULT: ICD-10-CM

## 2025-07-08 DIAGNOSIS — Z82.49 FAMILY HISTORY OF PREMATURE CAD: ICD-10-CM

## 2025-07-08 DIAGNOSIS — E66.811 CLASS 1 OBESITY DUE TO EXCESS CALORIES WITH SERIOUS COMORBIDITY AND BODY MASS INDEX (BMI) OF 33.0 TO 33.9 IN ADULT: ICD-10-CM

## 2025-07-08 DIAGNOSIS — Z82.49 FAMILY HISTORY OF CARDIOVASCULAR DISEASE: ICD-10-CM

## 2025-07-08 DIAGNOSIS — R06.09 DOE (DYSPNEA ON EXERTION): ICD-10-CM

## 2025-07-08 DIAGNOSIS — Z82.49 FAMILY HISTORY OF PREMATURE CAD: Primary | ICD-10-CM

## 2025-07-08 DIAGNOSIS — R00.2 PALPITATIONS: ICD-10-CM

## 2025-07-08 DIAGNOSIS — R60.0 EDEMA OF BOTH LEGS: ICD-10-CM

## 2025-07-08 DIAGNOSIS — R94.31 ABNORMAL ECG: ICD-10-CM

## 2025-07-08 DIAGNOSIS — E78.49 OTHER HYPERLIPIDEMIA: ICD-10-CM

## 2025-07-08 DIAGNOSIS — I10 HYPERTENSION, UNSPECIFIED TYPE: ICD-10-CM

## 2025-07-08 LAB
OHS QRS DURATION: 92 MS
OHS QTC CALCULATION: 426 MS

## 2025-07-08 PROCEDURE — 3008F BODY MASS INDEX DOCD: CPT | Mod: CPTII,S$GLB,, | Performed by: INTERNAL MEDICINE

## 2025-07-08 PROCEDURE — 99204 OFFICE O/P NEW MOD 45 MIN: CPT | Mod: 25,S$GLB,, | Performed by: INTERNAL MEDICINE

## 2025-07-08 PROCEDURE — 93005 ELECTROCARDIOGRAM TRACING: CPT

## 2025-07-08 PROCEDURE — 93010 ELECTROCARDIOGRAM REPORT: CPT | Mod: ,,, | Performed by: INTERNAL MEDICINE

## 2025-07-08 PROCEDURE — 4010F ACE/ARB THERAPY RXD/TAKEN: CPT | Mod: CPTII,S$GLB,, | Performed by: INTERNAL MEDICINE

## 2025-07-08 PROCEDURE — 3074F SYST BP LT 130 MM HG: CPT | Mod: CPTII,S$GLB,, | Performed by: INTERNAL MEDICINE

## 2025-07-08 PROCEDURE — 99999 PR PBB SHADOW E&M-EST. PATIENT-LVL III: CPT | Mod: PBBFAC,,, | Performed by: INTERNAL MEDICINE

## 2025-07-08 PROCEDURE — 3079F DIAST BP 80-89 MM HG: CPT | Mod: CPTII,S$GLB,, | Performed by: INTERNAL MEDICINE

## 2025-07-08 RX ORDER — HYDROCHLOROTHIAZIDE 25 MG/1
25 TABLET ORAL DAILY
COMMUNITY

## 2025-07-08 NOTE — PROGRESS NOTES
Subjective   Patient ID:  Mable Redding is a 54 y.o. female who presents for evaluation of Hypertension      HPI: Pt presents for eval.  Has f/u with Dr. Peterson, Cards in past.  Chart reviewed.  Has h/o lupus anticoagulant disorder, palpitations, elevated coronary calcium score, leg edema, remote h/o PE, HTN.  Nonsmoker.  + family h/o CV disease.  Was on coumadin very long time, then eventually got off of it (PE was blamed on oral contraceptives).  Saw Dr. Peterson for leg edema, palpitations, CP in past.  Negative nuclear stress test 2019.  Echo 2022 normal EF.  Ecg today 7/8/25 reviewed: NSR, abnl R wave progression precordial leads, nonspecific T wave abnl.  No changes compared to last several ecgs.  Recently more swelling of legs and ANN; really over 6 months.  Restarted HCTZ (takes prn) and helped.  Denies chest pain sxs.  Infrequent palpitations.        Past Medical History:   Diagnosis Date    Anxiety     Bursitis     Right hip    Colon cancer screening 12/30/2024    COVID-19 11/3/2021    Depression     Digestive disorder     Herpes genitalia     History of blood clots     HPV (human papilloma virus) infection     HTN (hypertension) 5/17/2022    Lupus anticoagulant disorder     Lupus anticoagulant disorder 1/30/2019    Pulmonary embolus     Thyroid disease        Current Outpatient Medications:     clobetasoL (TEMOVATE) 0.05 % external solution, Apply topically once daily., Disp: 50 mL, Rfl: 3    clonazePAM (KLONOPIN) 1 MG tablet, Take 1 tablet (1 mg total) by mouth every evening., Disp: 90 tablet, Rfl: 1    clotrimazole-betamethasone 1-0.05% (LOTRISONE) cream, Apply topically 2 (two) times daily., Disp: 15 g, Rfl: 2    diclofenac (VOLTAREN) 75 MG EC tablet, Take 1 tablet (75 mg total) by mouth 2 (two) times daily as needed (joint pain)., Disp: 60 tablet, Rfl: 2    doxycycline (ORACEA) 40 mg capsule, Take 1 capsule (40 mg total) by mouth once daily., Disp: 90 capsule, Rfl: 3    doxycycline (VIBRAMYCIN)  100 MG Cap, Take 1 capsule (100 mg total) by mouth 2 (two) times daily., Disp: 28 capsule, Rfl: 1    ezetimibe (ZETIA) 10 mg tablet, Take 1 tablet (10 mg total) by mouth once daily., Disp: 90 tablet, Rfl: 3    finasteride (PROSCAR) 5 mg tablet, Take 1 tablet by mouth daily, Disp: 90 tablet, Rfl: 3    fluconazole (DIFLUCAN) 150 MG Tab, Take 1 tablet (150 mg total) by mouth once daily., Disp: 3 tablet, Rfl: 3    ivermectin (SOOLANTRA) 1 % Crea, , Disp: , Rfl:     levothyroxine (SYNTHROID) 100 MCG tablet, Take 1 tablet (100 mcg total) by mouth daily before breakfast., Disp: 90 tablet, Rfl: 3    loratadine (CLARITIN) 10 mg tablet, TAKE 1 TABLET BY MOUTH EVERY DAY AS NEEDED FOR ALLERGY, Disp: 90 tablet, Rfl: 2    losartan (COZAAR) 100 MG tablet, Take 1 tablet (100 mg total) by mouth every evening., Disp: 90 tablet, Rfl: 3    metoprolol succinate (TOPROL-XL) 25 MG 24 hr tablet, Take 1 tablet (25 mg total) by mouth once daily., Disp: 90 tablet, Rfl: 3    minoxidiL (LONITEN) 2.5 MG tablet, Take 1/2 (half) tablet by mouth daily., Disp: 90 tablet, Rfl: 1    mupirocin (BACTROBAN) 2 % ointment, Apply to nostrils 2 (two) times daily., Disp: 22 g, Rfl: 2    pantoprazole (PROTONIX) 40 MG tablet, Take 1 tablet (40 mg total) by mouth every evening., Disp: 90 tablet, Rfl: 3    rosuvastatin (CRESTOR) 40 MG Tab, Take 1 tablet (40 mg total) by mouth every evening., Disp: 90 tablet, Rfl: 3    venlafaxine (EFFEXOR-XR) 37.5 MG 24 hr capsule, Take 1 capsule (37.5 mg total) by mouth once daily., Disp: 90 capsule, Rfl: 3    liothyronine (CYTOMEL) 5 MCG Tab, Take 2 tablets (10 mcg total) by mouth once daily. (Patient not taking: Reported on 7/8/2025), Disp: 180 tablet, Rfl: 3    lisdexamfetamine (VYVANSE) 40 MG Cap, Take 1 capsule (40 mg total) by mouth once daily., Disp: 30 capsule, Rfl: 0    traMADoL (ULTRAM) 50 mg tablet, Take 1 tablet (50 mg), by mouth, every 6-8 hours, as needed for pain (Patient not taking: Reported on 7/8/2025), Disp:  "28 tablet, Rfl: 0    Current Facility-Administered Medications:     triamcinolone acetonide injection 10 mg, 10 mg, Intradermal, 1 time in Clinic/HOD,     triamcinolone acetonide injection 10 mg, 10 mg, Intradermal, 1 time in Clinic/HOD,       Review of Systems   Constitutional: Negative.   HENT: Negative.     Eyes: Negative.    Cardiovascular:  Positive for dyspnea on exertion, leg swelling and palpitations.   Respiratory:  Positive for shortness of breath.    Endocrine: Negative.    Hematologic/Lymphatic: Negative.    Skin: Negative.    Musculoskeletal: Negative.    Gastrointestinal: Negative.    Genitourinary: Negative.    Neurological: Negative.    Psychiatric/Behavioral: Negative.     Allergic/Immunologic: Negative.      /80 (BP Location: Left arm, Patient Position: Sitting)   Pulse 69   Ht 5' 6" (1.676 m)   Wt 100.6 kg (221 lb 12.5 oz)   SpO2 98%   BMI 35.80 kg/m²     Wt Readings from Last 3 Encounters:   07/08/25 100.6 kg (221 lb 12.5 oz)   12/30/24 97.6 kg (215 lb 0.9 oz)   08/23/24 96.4 kg (212 lb 8.4 oz)     Temp Readings from Last 3 Encounters:   12/30/24 97.9 °F (36.6 °C) (Temporal)   01/25/23 98.4 °F (36.9 °C) (Tympanic)   10/28/22 98.1 °F (36.7 °C)     BP Readings from Last 3 Encounters:   07/08/25 120/80   05/09/25 127/80   12/30/24 136/72     Pulse Readings from Last 3 Encounters:   07/08/25 69   12/30/24 69   05/29/23 70          Objective     Physical Exam  Vitals and nursing note reviewed.   Constitutional:       General: She is not in acute distress.     Appearance: Normal appearance. She is well-developed. She is obese. She is not ill-appearing or diaphoretic.   HENT:      Head: Normocephalic.   Neck:      Thyroid: No thyromegaly.      Vascular: Normal carotid pulses. No carotid bruit, hepatojugular reflux or JVD.   Cardiovascular:      Rate and Rhythm: Normal rate and regular rhythm.      Pulses: Normal pulses.           Radial pulses are 2+ on the right side and 2+ on the left side. "      Heart sounds: Normal heart sounds, S1 normal and S2 normal. No murmur heard.     No friction rub. No gallop.   Pulmonary:      Effort: Pulmonary effort is normal.      Breath sounds: Normal breath sounds. No wheezing or rales.   Abdominal:      General: Bowel sounds are normal. There is no abdominal bruit.      Palpations: Abdomen is soft.      Tenderness: There is no abdominal tenderness.   Musculoskeletal:      Cervical back: Neck supple.      Right lower leg: Edema present.      Left lower leg: Edema present.   Lymphadenopathy:      Cervical: No cervical adenopathy.   Skin:     General: Skin is warm.   Neurological:      Mental Status: She is alert and oriented to person, place, and time.   Psychiatric:         Behavior: Behavior normal. Behavior is cooperative.       I have reviewed all pertinent labs and cardiac studies.      Chemistry        Component Value Date/Time     12/18/2024 0945    K 4.5 12/18/2024 0945     12/18/2024 0945    CO2 29 12/18/2024 0945    BUN 16 12/18/2024 0945    CREATININE 1.0 12/18/2024 0945     12/18/2024 0945        Component Value Date/Time    CALCIUM 9.8 12/18/2024 0945    ALKPHOS 81 12/18/2024 0945    AST 28 12/18/2024 0945    ALT 36 12/18/2024 0945    BILITOT 0.7 12/18/2024 0945    ESTGFRAFRICA >60 04/18/2022 1554    EGFRNONAA >60 04/18/2022 1554        Lab Results   Component Value Date    WBC 6.74 12/18/2024    HGB 13.5 12/18/2024    HCT 41.5 12/18/2024    MCV 89 12/18/2024     12/18/2024       Lab Results   Component Value Date    HGBA1C 5.5 12/18/2024       Lab Results   Component Value Date    CHOL 145 12/18/2024    CHOL 197 10/16/2007    CHOL 227 (H) 08/07/2007      Lab Results   Component Value Date    HDL 68 12/18/2024    HDL 52 10/16/2007    HDL 58 08/07/2007     Lab Results   Component Value Date    LDLCALC 57.6 (L) 12/18/2024    LDLCALC 113.0 10/16/2007    LDLCALC 137.6 (H) 08/07/2007      Lab Results   Component Value Date    TRIG 97  12/18/2024    TRIG 160 (H) 10/16/2007    TRIG 157 (H) 08/07/2007     Lab Results   Component Value Date    TOTALCHOLEST 2.1 12/18/2024    TOTALCHOLEST 3.8 10/16/2007    TOTALCHOLEST 3.9 08/07/2007     Lab Results   Component Value Date    NONHDLCHOL 77 12/18/2024     Lab Results   Component Value Date    CHOLHDL 46.9 12/18/2024    CHOLHDL 26.4 10/16/2007    CHOLHDL 25.6 08/07/2007     Lab Results   Component Value Date    TSH 0.267 (L) 12/18/2024         Results for orders placed during the hospital encounter of 07/22/22    Echo    Interpretation Summary  · Normal systolic function.  · The estimated ejection fraction is 60%.  · Normal left ventricular diastolic function.  · Normal right ventricular size with normal right ventricular systolic function.  · Mild tricuspid regurgitation.  · There is pulmonary hypertension.          No results found for this or any previous visit.         Assessment and Plan     1. Family history of premature CAD    2. Edema of both legs    3. ANN (dyspnea on exertion)    4. Class 1 obesity due to excess calories with serious comorbidity and body mass index (BMI) of 33.0 to 33.9 in adult    5. Other hyperlipidemia    6. Hypertension, unspecified type    7. High coronary artery calcium score    8. Palpitations    9. Abnormal ECG    10. Family history of cardiovascular disease        Plan:    Stress MPI.  Echocardiogram.  CMP + BNP labs.  Continue HCTZ most days/week (was on prn dosing).  HTN: goal < 130/80.  Continue current HTN meds.  Lipids: Low cholesterol diet. Continue statin/Zetia.  Obesity: Weight loss needed.  Abnl ecg: Monitor.  Palpitations: Monitor.  CAD: OMT and Cv risk factor modification.    Phone review.    Needs yearly f/u in future; sooner if needed.

## 2025-07-15 DIAGNOSIS — R63.2 BINGE EATING: ICD-10-CM

## 2025-07-15 DIAGNOSIS — L65.9 HAIR LOSS: ICD-10-CM

## 2025-07-15 DIAGNOSIS — R93.1 HIGH CORONARY ARTERY CALCIUM SCORE: ICD-10-CM

## 2025-07-15 DIAGNOSIS — R00.2 PALPITATION: ICD-10-CM

## 2025-07-15 DIAGNOSIS — I10 PRIMARY HYPERTENSION: ICD-10-CM

## 2025-07-15 RX ORDER — METOPROLOL SUCCINATE 25 MG/1
25 TABLET, EXTENDED RELEASE ORAL DAILY
Qty: 90 TABLET | Refills: 1 | Status: SHIPPED | OUTPATIENT
Start: 2025-07-15

## 2025-07-15 RX ORDER — FINASTERIDE 5 MG/1
5 TABLET, FILM COATED ORAL
Qty: 90 TABLET | Refills: 1 | Status: SHIPPED | OUTPATIENT
Start: 2025-07-15

## 2025-07-15 RX ORDER — EZETIMIBE 10 MG/1
10 TABLET ORAL DAILY
Qty: 90 TABLET | Refills: 1 | Status: SHIPPED | OUTPATIENT
Start: 2025-07-15 | End: 2026-01-11

## 2025-07-15 RX ORDER — ROSUVASTATIN CALCIUM 40 MG/1
40 TABLET, COATED ORAL NIGHTLY
Qty: 90 TABLET | Refills: 1 | Status: SHIPPED | OUTPATIENT
Start: 2025-07-15

## 2025-07-15 RX ORDER — LOSARTAN POTASSIUM 100 MG/1
100 TABLET ORAL NIGHTLY
Qty: 90 TABLET | Refills: 1 | Status: SHIPPED | OUTPATIENT
Start: 2025-07-15

## 2025-07-15 NOTE — TELEPHONE ENCOUNTER
No care due was identified.  Health [facility] Embedded Care Due Messages. Reference number: [id number].   7/15/2025 9:27:03 AM CDT

## 2025-07-16 DIAGNOSIS — F45.8 BRUXISM: ICD-10-CM

## 2025-07-16 RX ORDER — LISDEXAMFETAMINE DIMESYLATE 40 MG/1
40 CAPSULE ORAL DAILY
Qty: 30 CAPSULE | Refills: 0 | Status: SHIPPED | OUTPATIENT
Start: 2025-07-16 | End: 2025-08-15

## 2025-07-16 RX ORDER — CLONAZEPAM 1 MG/1
1 TABLET ORAL NIGHTLY
Qty: 90 TABLET | Refills: 1 | Status: SHIPPED | OUTPATIENT
Start: 2025-07-16

## 2025-07-16 NOTE — TELEPHONE ENCOUNTER
No care due was identified.  Health [facility] Embedded Care Due Messages. Reference number: [id number].   7/16/2025 1:07:26 PM CDT   Updated patient on plan of care, verbalized understanding. Patient awaiting hepatic panel lab results

## 2025-07-16 NOTE — TELEPHONE ENCOUNTER
Refill Decision Note   Mable Redding  is requesting a refill authorization.  Brief Assessment and Rationale for Refill:        Medication Therapy Plan:            Comments:     Note composed:7:37 PM 07/15/2025

## 2025-07-16 NOTE — TELEPHONE ENCOUNTER
Refill Routing Note   Medication(s) are not appropriate for processing by [facility] for the following reason(s):        Outside of protocol    [facility] action(s):  Route             Appointments  past 12m or future 3m with PCP    Date Provider   Last Visit   12/16/2024 Adelso Krueger MD   Next Visit   10/3/2025 Adelso Krueger MD   ED visits in past 90 days: 0        Note composed:1:24 PM 07/16/2025

## 2025-07-16 NOTE — TELEPHONE ENCOUNTER
Refill Routing Note   Medication(s) are not appropriate for processing by [facility] for the following reason(s):        Outside of protocol    [facility] action(s):  Approve  Route             Appointments  past 12m or future 3m with PCP    Date Provider   Last Visit   12/16/2024 Adelso Krueger MD   Next Visit   10/3/2025 Adelso Krueger MD   ED visits in past 90 days: 0        Note composed:7:38 PM 07/15/2025

## 2025-07-25 ENCOUNTER — LAB VISIT (OUTPATIENT)
Dept: LAB | Facility: HOSPITAL | Age: 55
End: 2025-07-25
Payer: COMMERCIAL

## 2025-07-25 DIAGNOSIS — E61.1 IRON DEFICIENCY: Primary | ICD-10-CM

## 2025-07-25 DIAGNOSIS — E61.1 IRON DEFICIENCY: ICD-10-CM

## 2025-07-25 LAB
FERRITIN SERPL-MCNC: 191 NG/ML (ref 20–300)
IRON SATN MFR SERPL: 20 % (ref 20–50)
IRON SERPL-MCNC: 74 UG/DL (ref 30–160)
TIBC SERPL-MCNC: 371 UG/DL (ref 250–450)
TRANSFERRIN SERPL-MCNC: 251 MG/DL (ref 200–375)

## 2025-07-25 PROCEDURE — 36415 COLL VENOUS BLD VENIPUNCTURE: CPT

## 2025-07-25 PROCEDURE — 82728 ASSAY OF FERRITIN: CPT

## 2025-07-25 PROCEDURE — 83540 ASSAY OF IRON: CPT

## 2025-08-12 DIAGNOSIS — R63.2 BINGE EATING: ICD-10-CM

## 2025-08-12 RX ORDER — LISDEXAMFETAMINE DIMESYLATE 40 MG/1
40 CAPSULE ORAL DAILY
Qty: 30 CAPSULE | Refills: 0 | Status: SHIPPED | OUTPATIENT
Start: 2025-08-12 | End: 2025-09-11

## (undated) DEVICE — SYR ONLY LUER LOCK 20CC

## (undated) DEVICE — ELECTRODE REM PLYHSV RETURN 9

## (undated) DEVICE — SEE MEDLINE ITEM 157027

## (undated) DEVICE — GLOVE BIOGEL 7.5

## (undated) DEVICE — SEE MEDLINE ITEM 157216

## (undated) DEVICE — PAD ABD 8X10 STERILE

## (undated) DEVICE — SEE MEDLINE ITEM 157131

## (undated) DEVICE — DRAPE STERI U-SHAPED 47X51IN

## (undated) DEVICE — APPLICATOR CHLORAPREP ORN 26ML

## (undated) DEVICE — SUT VICRYL PLUS 0 CT1 18IN

## (undated) DEVICE — TUBING PUMP ARTHROSCOPY STRL

## (undated) DEVICE — COVER CAMERA OPERATING ROOM

## (undated) DEVICE — MANIFOLD 4 PORT

## (undated) DEVICE — POSITIONER HEAD DONUT 9IN FOAM

## (undated) DEVICE — TUBING SUCTION STRAIGHT .25X20

## (undated) DEVICE — SUPPORT ULNA NERVE PROTECTOR

## (undated) DEVICE — COVER LIGHT HANDLE 80/CA

## (undated) DEVICE — DRAPE PLASTIC U 60X72

## (undated) DEVICE — SUT ETHILON 3-0 PS2 18 BLK

## (undated) DEVICE — GAUZE SPONGE 4X4 12PLY

## (undated) DEVICE — BLADE SURG #15 CARBON STEEL

## (undated) DEVICE — SEE MEDLINE ITEM 146292

## (undated) DEVICE — SHAVER SABRETOOTH 4MMX12.5CM

## (undated) DEVICE — UNDERGLOVES BIOGEL PI SIZE 8.5

## (undated) DEVICE — SEE MEDLINE ITEM 146298

## (undated) DEVICE — GLOVE PROTEXIS LTX  8.5

## (undated) DEVICE — UNDERGLOVES BIOGEL PI SZ 6 LF

## (undated) DEVICE — UNDERGLOVES BIOGEL PI SIZE 8

## (undated) DEVICE — BLADE SHAVER TORPEDO 4MMX13CM

## (undated) DEVICE — PROBE MULTI PORT RF 90 DEGREE

## (undated) DEVICE — MAT SURGICAL ECOSUCTIONER

## (undated) DEVICE — SOL IRR NACL .9% 3000ML

## (undated) DEVICE — BANDAGE ACE DOUBLE STER 6IN

## (undated) DEVICE — GLOVE SURGEONS ULTRA TOUCH 5.5

## (undated) DEVICE — PAD CAST SPECIALIST STRL 6

## (undated) DEVICE — TIP SUCTION YANKAUER

## (undated) DEVICE — TOWEL OR DISP STRL BLUE 4/PK

## (undated) DEVICE — GOWN SMARTGOWN LVL4 X-LONG XL